# Patient Record
Sex: FEMALE | Race: WHITE | NOT HISPANIC OR LATINO
[De-identification: names, ages, dates, MRNs, and addresses within clinical notes are randomized per-mention and may not be internally consistent; named-entity substitution may affect disease eponyms.]

---

## 2017-01-04 ENCOUNTER — APPOINTMENT (OUTPATIENT)
Dept: OBGYN | Facility: CLINIC | Age: 62
End: 2017-01-04

## 2017-01-04 VITALS
SYSTOLIC BLOOD PRESSURE: 130 MMHG | WEIGHT: 238 LBS | DIASTOLIC BLOOD PRESSURE: 80 MMHG | HEIGHT: 63 IN | BODY MASS INDEX: 42.17 KG/M2

## 2017-01-04 DIAGNOSIS — R93.8 ABNORMAL FINDINGS ON DIAGNOSTIC IMAGING OF OTHER SPECIFIED BODY STRUCTURES: ICD-10-CM

## 2017-01-06 ENCOUNTER — OUTPATIENT (OUTPATIENT)
Dept: OUTPATIENT SERVICES | Facility: HOSPITAL | Age: 62
LOS: 1 days | Discharge: HOME | End: 2017-01-06

## 2017-01-06 DIAGNOSIS — R10.2 PELVIC AND PERINEAL PAIN: ICD-10-CM

## 2017-01-13 ENCOUNTER — OUTPATIENT (OUTPATIENT)
Dept: OUTPATIENT SERVICES | Facility: HOSPITAL | Age: 62
LOS: 1 days | Discharge: HOME | End: 2017-01-13
Payer: MEDICAID

## 2017-01-13 DIAGNOSIS — K57.90 DIVERTICULOSIS OF INTESTINE, PART UNSPECIFIED, WITHOUT PERFORATION OR ABSCESS WITHOUT BLEEDING: ICD-10-CM

## 2017-01-13 DIAGNOSIS — M19.90 UNSPECIFIED OSTEOARTHRITIS, UNSPECIFIED SITE: ICD-10-CM

## 2017-01-13 DIAGNOSIS — Q25.49 OTHER CONGENITAL MALFORMATIONS OF AORTA: ICD-10-CM

## 2017-01-13 DIAGNOSIS — R09.82 POSTNASAL DRIP: ICD-10-CM

## 2017-01-13 DIAGNOSIS — E66.9 OBESITY, UNSPECIFIED: ICD-10-CM

## 2017-01-13 DIAGNOSIS — Z90.49 ACQUIRED ABSENCE OF OTHER SPECIFIED PARTS OF DIGESTIVE TRACT: ICD-10-CM

## 2017-01-13 DIAGNOSIS — G90.2 HORNER'S SYNDROME: ICD-10-CM

## 2017-01-13 DIAGNOSIS — J30.89 OTHER ALLERGIC RHINITIS: ICD-10-CM

## 2017-01-13 DIAGNOSIS — K64.9 UNSPECIFIED HEMORRHOIDS: ICD-10-CM

## 2017-01-13 DIAGNOSIS — E78.5 HYPERLIPIDEMIA, UNSPECIFIED: ICD-10-CM

## 2017-01-13 DIAGNOSIS — M85.80 OTHER SPECIFIED DISORDERS OF BONE DENSITY AND STRUCTURE, UNSPECIFIED SITE: ICD-10-CM

## 2017-01-13 DIAGNOSIS — K21.9 GASTRO-ESOPHAGEAL REFLUX DISEASE WITHOUT ESOPHAGITIS: ICD-10-CM

## 2017-01-13 DIAGNOSIS — J34.2 DEVIATED NASAL SEPTUM: ICD-10-CM

## 2017-01-13 DIAGNOSIS — R10.2 PELVIC AND PERINEAL PAIN: ICD-10-CM

## 2017-01-13 DIAGNOSIS — F32.9 MAJOR DEPRESSIVE DISORDER, SINGLE EPISODE, UNSPECIFIED: ICD-10-CM

## 2017-01-13 DIAGNOSIS — I10 ESSENTIAL (PRIMARY) HYPERTENSION: ICD-10-CM

## 2017-01-13 DIAGNOSIS — F41.9 ANXIETY DISORDER, UNSPECIFIED: ICD-10-CM

## 2017-01-13 DIAGNOSIS — G47.30 SLEEP APNEA, UNSPECIFIED: ICD-10-CM

## 2017-01-13 PROCEDURE — 88305 TISSUE EXAM BY PATHOLOGIST: CPT | Mod: 26

## 2017-01-13 PROCEDURE — 58558 HYSTEROSCOPY BIOPSY: CPT

## 2017-01-20 DIAGNOSIS — Z96.651 PRESENCE OF RIGHT ARTIFICIAL KNEE JOINT: ICD-10-CM

## 2017-01-20 DIAGNOSIS — E78.5 HYPERLIPIDEMIA, UNSPECIFIED: ICD-10-CM

## 2017-01-20 DIAGNOSIS — N84.0 POLYP OF CORPUS UTERI: ICD-10-CM

## 2017-01-20 DIAGNOSIS — Z88.8 ALLERGY STATUS TO OTHER DRUGS, MEDICAMENTS AND BIOLOGICAL SUBSTANCES STATUS: ICD-10-CM

## 2017-01-20 DIAGNOSIS — F41.8 OTHER SPECIFIED ANXIETY DISORDERS: ICD-10-CM

## 2017-01-20 DIAGNOSIS — K21.9 GASTRO-ESOPHAGEAL REFLUX DISEASE WITHOUT ESOPHAGITIS: ICD-10-CM

## 2017-01-20 DIAGNOSIS — Z87.891 PERSONAL HISTORY OF NICOTINE DEPENDENCE: ICD-10-CM

## 2017-01-20 DIAGNOSIS — Z79.82 LONG TERM (CURRENT) USE OF ASPIRIN: ICD-10-CM

## 2017-01-20 DIAGNOSIS — I34.0 NONRHEUMATIC MITRAL (VALVE) INSUFFICIENCY: ICD-10-CM

## 2017-01-20 DIAGNOSIS — Z88.2 ALLERGY STATUS TO SULFONAMIDES: ICD-10-CM

## 2017-01-20 DIAGNOSIS — I10 ESSENTIAL (PRIMARY) HYPERTENSION: ICD-10-CM

## 2017-01-31 ENCOUNTER — APPOINTMENT (OUTPATIENT)
Dept: UROLOGY | Facility: CLINIC | Age: 62
End: 2017-01-31

## 2017-01-31 VITALS
HEIGHT: 63 IN | HEART RATE: 52 BPM | WEIGHT: 238 LBS | BODY MASS INDEX: 42.17 KG/M2 | DIASTOLIC BLOOD PRESSURE: 81 MMHG | SYSTOLIC BLOOD PRESSURE: 119 MMHG | TEMPERATURE: 97.5 F

## 2017-02-01 ENCOUNTER — APPOINTMENT (OUTPATIENT)
Dept: OBGYN | Facility: CLINIC | Age: 62
End: 2017-02-01

## 2017-02-01 VITALS
BODY MASS INDEX: 41.64 KG/M2 | WEIGHT: 235 LBS | TEMPERATURE: 97.6 F | HEIGHT: 63 IN | SYSTOLIC BLOOD PRESSURE: 124 MMHG | DIASTOLIC BLOOD PRESSURE: 74 MMHG

## 2017-03-13 ENCOUNTER — RX RENEWAL (OUTPATIENT)
Age: 62
End: 2017-03-13

## 2017-03-15 ENCOUNTER — MESSAGE (OUTPATIENT)
Age: 62
End: 2017-03-15

## 2017-04-12 ENCOUNTER — APPOINTMENT (OUTPATIENT)
Dept: OBGYN | Facility: CLINIC | Age: 62
End: 2017-04-12

## 2017-04-12 ENCOUNTER — LABORATORY RESULT (OUTPATIENT)
Age: 62
End: 2017-04-12

## 2017-04-12 VITALS
SYSTOLIC BLOOD PRESSURE: 124 MMHG | BODY MASS INDEX: 42.52 KG/M2 | HEIGHT: 63 IN | DIASTOLIC BLOOD PRESSURE: 70 MMHG | WEIGHT: 240 LBS

## 2017-04-12 DIAGNOSIS — R53.83 OTHER FATIGUE: ICD-10-CM

## 2017-04-12 DIAGNOSIS — N95.2 POSTMENOPAUSAL ATROPHIC VAGINITIS: ICD-10-CM

## 2017-04-15 LAB
BASOPHILS # BLD AUTO: 0.02 K/UL
BASOPHILS NFR BLD AUTO: 0.4 %
C TRACH RRNA SPEC QL NAA+PROBE: NORMAL
CYTOLOGY CVX/VAG DOC THIN PREP: NORMAL
EOSINOPHIL # BLD AUTO: 0.23 K/UL
EOSINOPHIL NFR BLD AUTO: 4.8 %
HBV SURFACE AG SER QL: NONREACTIVE
HCT VFR BLD CALC: 43.4 %
HCV AB SER QL: NONREACTIVE
HCV S/CO RATIO: 0.19 S/CO
HGB BLD-MCNC: 14.8 G/DL
HIV1+2 AB SPEC QL IA.RAPID: NONREACTIVE
HPV HIGH+LOW RISK DNA PNL CVX: NEGATIVE
IMM GRANULOCYTES NFR BLD AUTO: 0.2 %
LYMPHOCYTES # BLD AUTO: 1.11 K/UL
LYMPHOCYTES NFR BLD AUTO: 23.2 %
MAN DIFF?: NORMAL
MCHC RBC-ENTMCNC: 31.9 PG
MCHC RBC-ENTMCNC: 34.1 GM/DL
MCV RBC AUTO: 93.5 FL
MONOCYTES # BLD AUTO: 0.47 K/UL
MONOCYTES NFR BLD AUTO: 9.8 %
N GONORRHOEA RRNA SPEC QL NAA+PROBE: NORMAL
NEUTROPHILS # BLD AUTO: 2.95 K/UL
NEUTROPHILS NFR BLD AUTO: 61.6 %
PLATELET # BLD AUTO: 330 K/UL
RBC # BLD: 4.64 M/UL
RBC # FLD: 12.7 %
RPR SER-TITR: NORMAL
SOURCE TP AMPLIFICATION: NORMAL
TSH SERPL-ACNC: 1.18 UIU/ML
WBC # FLD AUTO: 4.79 K/UL

## 2017-04-17 ENCOUNTER — RESULT REVIEW (OUTPATIENT)
Age: 62
End: 2017-04-17

## 2017-05-09 ENCOUNTER — TRANSCRIPTION ENCOUNTER (OUTPATIENT)
Age: 62
End: 2017-05-09

## 2017-11-01 ENCOUNTER — OUTPATIENT (OUTPATIENT)
Dept: OUTPATIENT SERVICES | Facility: HOSPITAL | Age: 62
LOS: 1 days | Discharge: HOME | End: 2017-11-01

## 2017-11-01 DIAGNOSIS — R13.19 OTHER DYSPHAGIA: ICD-10-CM

## 2017-11-01 DIAGNOSIS — F45.8 OTHER SOMATOFORM DISORDERS: ICD-10-CM

## 2017-11-01 DIAGNOSIS — K21.9 GASTRO-ESOPHAGEAL REFLUX DISEASE WITHOUT ESOPHAGITIS: ICD-10-CM

## 2017-11-03 ENCOUNTER — FORM ENCOUNTER (OUTPATIENT)
Age: 62
End: 2017-11-03

## 2017-11-04 ENCOUNTER — APPOINTMENT (OUTPATIENT)
Dept: MAMMOGRAPHY | Facility: CLINIC | Age: 62
End: 2017-11-04
Payer: MEDICAID

## 2017-11-04 ENCOUNTER — OUTPATIENT (OUTPATIENT)
Dept: OUTPATIENT SERVICES | Facility: HOSPITAL | Age: 62
LOS: 1 days | End: 2017-11-04
Payer: MEDICAID

## 2017-11-04 DIAGNOSIS — Z00.8 ENCOUNTER FOR OTHER GENERAL EXAMINATION: ICD-10-CM

## 2017-11-04 PROCEDURE — 77067 SCR MAMMO BI INCL CAD: CPT

## 2017-11-04 PROCEDURE — G0202: CPT | Mod: 26

## 2017-11-04 PROCEDURE — 77063 BREAST TOMOSYNTHESIS BI: CPT

## 2017-11-04 PROCEDURE — 77063 BREAST TOMOSYNTHESIS BI: CPT | Mod: 26

## 2018-01-18 ENCOUNTER — RX RENEWAL (OUTPATIENT)
Age: 63
End: 2018-01-18

## 2018-01-23 ENCOUNTER — MEDICATION RENEWAL (OUTPATIENT)
Age: 63
End: 2018-01-23

## 2018-02-02 ENCOUNTER — APPOINTMENT (OUTPATIENT)
Dept: UROLOGY | Facility: CLINIC | Age: 63
End: 2018-02-02
Payer: MEDICAID

## 2018-02-02 VITALS
HEIGHT: 63 IN | BODY MASS INDEX: 41.64 KG/M2 | DIASTOLIC BLOOD PRESSURE: 90 MMHG | HEART RATE: 68 BPM | OXYGEN SATURATION: 98 % | SYSTOLIC BLOOD PRESSURE: 135 MMHG | WEIGHT: 235 LBS | TEMPERATURE: 97.6 F | RESPIRATION RATE: 16 BRPM

## 2018-02-02 PROCEDURE — 99213 OFFICE O/P EST LOW 20 MIN: CPT

## 2018-02-03 ENCOUNTER — OUTPATIENT (OUTPATIENT)
Dept: OUTPATIENT SERVICES | Facility: HOSPITAL | Age: 63
LOS: 1 days | Discharge: HOME | End: 2018-02-03

## 2018-02-03 DIAGNOSIS — E78.00 PURE HYPERCHOLESTEROLEMIA, UNSPECIFIED: ICD-10-CM

## 2018-02-03 DIAGNOSIS — R82.71 BACTERIURIA: ICD-10-CM

## 2018-02-03 DIAGNOSIS — I10 ESSENTIAL (PRIMARY) HYPERTENSION: ICD-10-CM

## 2018-02-03 LAB
ALBUMIN SERPL ELPH-MCNC: 3.7 G/DL — SIGNIFICANT CHANGE UP (ref 3.3–5)
ALP SERPL-CCNC: 82 U/L — SIGNIFICANT CHANGE UP (ref 40–120)
ALT FLD-CCNC: 23 U/L — SIGNIFICANT CHANGE UP (ref 12–78)
ANION GAP SERPL CALC-SCNC: 6 MMOL/L — SIGNIFICANT CHANGE UP (ref 5–17)
AST SERPL-CCNC: 14 U/L — LOW (ref 15–37)
BILIRUB SERPL-MCNC: 0.5 MG/DL — SIGNIFICANT CHANGE UP (ref 0.2–1.2)
BUN SERPL-MCNC: 19 MG/DL — SIGNIFICANT CHANGE UP (ref 7–23)
CALCIUM SERPL-MCNC: 8.8 MG/DL — SIGNIFICANT CHANGE UP (ref 8.5–10.1)
CHLORIDE SERPL-SCNC: 104 MMOL/L — SIGNIFICANT CHANGE UP (ref 96–108)
CHOLEST SERPL-MCNC: 204 MG/DL — HIGH (ref 10–199)
CO2 SERPL-SCNC: 30 MMOL/L — SIGNIFICANT CHANGE UP (ref 22–31)
CREAT SERPL-MCNC: 0.99 MG/DL — SIGNIFICANT CHANGE UP (ref 0.5–1.3)
ESTIMATED AVERAGE GLUCOSE: 111 MG/DL — SIGNIFICANT CHANGE UP (ref 68–114)
GLUCOSE SERPL-MCNC: 109 MG/DL — HIGH (ref 70–99)
HBA1C BLD-MCNC: 5.5 % — SIGNIFICANT CHANGE UP (ref 4–5.6)
HCT VFR BLD CALC: 42.2 % — SIGNIFICANT CHANGE UP (ref 34.5–45)
HCV AB S/CO SERPL IA: 0.12 S/CO — SIGNIFICANT CHANGE UP
HCV AB SERPL-IMP: SIGNIFICANT CHANGE UP
HDLC SERPL-MCNC: 67 MG/DL — SIGNIFICANT CHANGE UP (ref 40–125)
HGB BLD-MCNC: 14.6 G/DL — SIGNIFICANT CHANGE UP (ref 11.5–15.5)
LIPID PNL WITH DIRECT LDL SERPL: 114 MG/DL — SIGNIFICANT CHANGE UP
MCHC RBC-ENTMCNC: 31.8 PG — SIGNIFICANT CHANGE UP (ref 27–34)
MCHC RBC-ENTMCNC: 34.6 GM/DL — SIGNIFICANT CHANGE UP (ref 32–36)
MCV RBC AUTO: 91.9 FL — SIGNIFICANT CHANGE UP (ref 80–100)
PLATELET # BLD AUTO: 374 K/UL — SIGNIFICANT CHANGE UP (ref 150–400)
POTASSIUM SERPL-MCNC: 3.8 MMOL/L — SIGNIFICANT CHANGE UP (ref 3.5–5.3)
POTASSIUM SERPL-SCNC: 3.8 MMOL/L — SIGNIFICANT CHANGE UP (ref 3.5–5.3)
PROT SERPL-MCNC: 7.6 GM/DL — SIGNIFICANT CHANGE UP (ref 6–8.3)
RBC # BLD: 4.59 M/UL — SIGNIFICANT CHANGE UP (ref 3.8–5.2)
RBC # FLD: 11.3 % — SIGNIFICANT CHANGE UP (ref 10.3–14.5)
SODIUM SERPL-SCNC: 140 MMOL/L — SIGNIFICANT CHANGE UP (ref 135–145)
T3 SERPL-MCNC: 124 NG/DL — SIGNIFICANT CHANGE UP (ref 80–200)
T4 FREE SERPL-MCNC: 1.08 NG/DL — SIGNIFICANT CHANGE UP (ref 0.76–1.46)
TOTAL CHOLESTEROL/HDL RATIO MEASUREMENT: 3 RATIO — LOW (ref 3.3–7.1)
TRIGL SERPL-MCNC: 116 MG/DL — SIGNIFICANT CHANGE UP (ref 10–149)
TSH SERPL-MCNC: 1.42 UU/ML — SIGNIFICANT CHANGE UP (ref 0.36–3.74)
WBC # BLD: 4.8 K/UL — SIGNIFICANT CHANGE UP (ref 3.8–10.5)
WBC # FLD AUTO: 4.8 K/UL — SIGNIFICANT CHANGE UP (ref 3.8–10.5)

## 2018-02-04 LAB
CULTURE RESULTS: SIGNIFICANT CHANGE UP
SPECIMEN SOURCE: SIGNIFICANT CHANGE UP

## 2018-02-05 LAB — HIV 1+2 AB+HIV1 P24 AG SERPL QL IA: SIGNIFICANT CHANGE UP

## 2018-03-19 ENCOUNTER — RX RENEWAL (OUTPATIENT)
Age: 63
End: 2018-03-19

## 2018-03-28 ENCOUNTER — APPOINTMENT (OUTPATIENT)
Dept: INTERNAL MEDICINE | Facility: CLINIC | Age: 63
End: 2018-03-28
Payer: MEDICAID

## 2018-03-28 VITALS
HEART RATE: 93 BPM | TEMPERATURE: 98.2 F | SYSTOLIC BLOOD PRESSURE: 126 MMHG | DIASTOLIC BLOOD PRESSURE: 70 MMHG | RESPIRATION RATE: 20 BRPM | BODY MASS INDEX: 43.41 KG/M2 | WEIGHT: 245 LBS | OXYGEN SATURATION: 95 % | HEIGHT: 63 IN

## 2018-03-28 DIAGNOSIS — F41.9 ANXIETY DISORDER, UNSPECIFIED: ICD-10-CM

## 2018-03-28 DIAGNOSIS — F32.9 MAJOR DEPRESSIVE DISORDER, SINGLE EPISODE, UNSPECIFIED: ICD-10-CM

## 2018-03-28 DIAGNOSIS — Z71.9 COUNSELING, UNSPECIFIED: ICD-10-CM

## 2018-03-28 DIAGNOSIS — I10 ESSENTIAL (PRIMARY) HYPERTENSION: ICD-10-CM

## 2018-03-28 PROCEDURE — 94060 EVALUATION OF WHEEZING: CPT

## 2018-03-28 PROCEDURE — 94727 GAS DIL/WSHOT DETER LNG VOL: CPT

## 2018-03-28 PROCEDURE — 94729 DIFFUSING CAPACITY: CPT

## 2018-03-28 PROCEDURE — 99244 OFF/OP CNSLTJ NEW/EST MOD 40: CPT | Mod: 25

## 2018-03-28 PROCEDURE — ZZZZZ: CPT

## 2018-03-28 RX ORDER — ASPIRIN 81 MG
81 TABLET, DELAYED RELEASE (ENTERIC COATED) ORAL
Refills: 0 | Status: ACTIVE | COMMUNITY

## 2018-03-28 RX ORDER — IRON,CARBONYL/ASCORBIC ACID 65MG-125MG
TABLET, DELAYED RELEASE (ENTERIC COATED) ORAL
Refills: 0 | Status: ACTIVE | COMMUNITY

## 2018-05-09 ENCOUNTER — OTHER (OUTPATIENT)
Age: 63
End: 2018-05-09

## 2018-05-16 ENCOUNTER — APPOINTMENT (OUTPATIENT)
Dept: OBGYN | Facility: CLINIC | Age: 63
End: 2018-05-16
Payer: MEDICAID

## 2018-05-16 VITALS
HEIGHT: 63 IN | RESPIRATION RATE: 18 BRPM | WEIGHT: 245 LBS | SYSTOLIC BLOOD PRESSURE: 130 MMHG | HEART RATE: 86 BPM | DIASTOLIC BLOOD PRESSURE: 74 MMHG | BODY MASS INDEX: 43.41 KG/M2

## 2018-05-16 DIAGNOSIS — Z01.419 ENCOUNTER FOR GYNECOLOGICAL EXAMINATION (GENERAL) (ROUTINE) W/OUT ABNORMAL FINDINGS: ICD-10-CM

## 2018-05-16 PROCEDURE — 99396 PREV VISIT EST AGE 40-64: CPT

## 2018-05-16 PROCEDURE — 82270 OCCULT BLOOD FECES: CPT

## 2018-05-19 LAB — HPV HIGH+LOW RISK DNA PNL CVX: NOT DETECTED

## 2018-05-21 ENCOUNTER — RX RENEWAL (OUTPATIENT)
Age: 63
End: 2018-05-21

## 2018-05-29 ENCOUNTER — APPOINTMENT (OUTPATIENT)
Dept: COLORECTAL SURGERY | Facility: CLINIC | Age: 63
End: 2018-05-29
Payer: MEDICAID

## 2018-05-29 VITALS
SYSTOLIC BLOOD PRESSURE: 111 MMHG | HEIGHT: 63 IN | TEMPERATURE: 97.9 F | WEIGHT: 245 LBS | DIASTOLIC BLOOD PRESSURE: 71 MMHG | BODY MASS INDEX: 43.41 KG/M2 | HEART RATE: 84 BPM

## 2018-05-29 DIAGNOSIS — Z78.9 OTHER SPECIFIED HEALTH STATUS: ICD-10-CM

## 2018-05-29 DIAGNOSIS — K43.2 INCISIONAL HERNIA W/OUT OBSTRUCTION OR GANGRENE: ICD-10-CM

## 2018-05-29 DIAGNOSIS — Z80.42 FAMILY HISTORY OF MALIGNANT NEOPLASM OF PROSTATE: ICD-10-CM

## 2018-05-29 DIAGNOSIS — Z80.0 FAMILY HISTORY OF MALIGNANT NEOPLASM OF DIGESTIVE ORGANS: ICD-10-CM

## 2018-05-29 DIAGNOSIS — Z82.5 FAMILY HISTORY OF ASTHMA AND OTHER CHRONIC LOWER RESPIRATORY DISEASES: ICD-10-CM

## 2018-05-29 DIAGNOSIS — Z83.49 FAMILY HISTORY OF OTHER ENDOCRINE, NUTRITIONAL AND METABOLIC DISEASES: ICD-10-CM

## 2018-05-29 DIAGNOSIS — Z83.3 FAMILY HISTORY OF DIABETES MELLITUS: ICD-10-CM

## 2018-05-29 DIAGNOSIS — Z82.49 FAMILY HISTORY OF ISCHEMIC HEART DISEASE AND OTHER DISEASES OF THE CIRCULATORY SYSTEM: ICD-10-CM

## 2018-05-29 DIAGNOSIS — Z82.61 FAMILY HISTORY OF ARTHRITIS: ICD-10-CM

## 2018-05-29 DIAGNOSIS — Z82.69 FAMILY HISTORY OF OTHER DISEASES OF THE MUSCULOSKELETAL SYSTEM AND CONNECTIVE TISSUE: ICD-10-CM

## 2018-05-29 LAB — CYTOLOGY CVX/VAG DOC THIN PREP: NORMAL

## 2018-05-29 PROCEDURE — 99245 OFF/OP CONSLTJ NEW/EST HI 55: CPT

## 2018-05-30 PROBLEM — Z82.61 FAMILY HISTORY OF RHEUMATOID ARTHRITIS: Status: ACTIVE | Noted: 2018-03-28

## 2018-05-30 PROBLEM — Z82.5 FAMILY HISTORY OF ASTHMA: Status: ACTIVE | Noted: 2018-03-28

## 2018-05-30 PROBLEM — Z82.5 FAMILY HISTORY OF EMPHYSEMA: Status: ACTIVE | Noted: 2018-03-28

## 2018-05-30 PROBLEM — Z80.0 FAMILY HISTORY OF MALIGNANT NEOPLASM OF COLON: Status: ACTIVE | Noted: 2018-03-28

## 2018-05-30 PROBLEM — Z83.3 FAMILY HISTORY OF TYPE 2 DIABETES MELLITUS: Status: ACTIVE | Noted: 2018-03-28

## 2018-05-30 PROBLEM — Z82.49 FAMILY HISTORY OF HYPERTENSION: Status: ACTIVE | Noted: 2018-03-28

## 2018-05-30 PROBLEM — Z82.69 FAMILY HISTORY OF FIBROMYALGIA: Status: ACTIVE | Noted: 2018-03-28

## 2018-05-30 PROBLEM — K43.2 INCISIONAL HERNIA: Status: ACTIVE | Noted: 2018-05-30

## 2018-05-30 PROBLEM — Z83.49 FAMILY HISTORY OF THYROID DISEASE: Status: ACTIVE | Noted: 2018-03-28

## 2018-05-30 PROBLEM — Z80.42 FAMILY HISTORY OF MALIGNANT NEOPLASM OF PROSTATE: Status: ACTIVE | Noted: 2018-03-28

## 2018-05-30 PROBLEM — Z78.9 RARELY CONSUMES ALCOHOL: Status: ACTIVE | Noted: 2018-03-28

## 2018-06-04 ENCOUNTER — APPOINTMENT (OUTPATIENT)
Dept: INTERNAL MEDICINE | Facility: CLINIC | Age: 63
End: 2018-06-04

## 2018-06-13 ENCOUNTER — RESULT REVIEW (OUTPATIENT)
Age: 63
End: 2018-06-13

## 2018-07-17 ENCOUNTER — RX RENEWAL (OUTPATIENT)
Age: 63
End: 2018-07-17

## 2018-10-03 ENCOUNTER — RX RENEWAL (OUTPATIENT)
Age: 63
End: 2018-10-03

## 2018-11-05 ENCOUNTER — FORM ENCOUNTER (OUTPATIENT)
Age: 63
End: 2018-11-05

## 2018-11-06 ENCOUNTER — OUTPATIENT (OUTPATIENT)
Dept: OUTPATIENT SERVICES | Facility: HOSPITAL | Age: 63
LOS: 1 days | End: 2018-11-06
Payer: MEDICAID

## 2018-11-06 ENCOUNTER — APPOINTMENT (OUTPATIENT)
Dept: MAMMOGRAPHY | Facility: CLINIC | Age: 63
End: 2018-11-06
Payer: MEDICAID

## 2018-11-06 DIAGNOSIS — Z00.8 ENCOUNTER FOR OTHER GENERAL EXAMINATION: ICD-10-CM

## 2018-11-06 PROCEDURE — 77067 SCR MAMMO BI INCL CAD: CPT | Mod: 26

## 2018-11-06 PROCEDURE — 77067 SCR MAMMO BI INCL CAD: CPT

## 2018-11-06 PROCEDURE — 77063 BREAST TOMOSYNTHESIS BI: CPT | Mod: 26

## 2018-11-06 PROCEDURE — 77063 BREAST TOMOSYNTHESIS BI: CPT

## 2018-11-28 ENCOUNTER — OUTPATIENT (OUTPATIENT)
Dept: OUTPATIENT SERVICES | Facility: HOSPITAL | Age: 63
LOS: 1 days | Discharge: HOME | End: 2018-11-28

## 2018-11-28 DIAGNOSIS — I71.2 THORACIC AORTIC ANEURYSM, WITHOUT RUPTURE: ICD-10-CM

## 2019-01-28 ENCOUNTER — APPOINTMENT (OUTPATIENT)
Dept: RADIOLOGY | Facility: CLINIC | Age: 64
End: 2019-01-28

## 2019-05-10 ENCOUNTER — LABORATORY RESULT (OUTPATIENT)
Age: 64
End: 2019-05-10

## 2019-05-16 ENCOUNTER — RESULT REVIEW (OUTPATIENT)
Age: 64
End: 2019-05-16

## 2019-05-16 ENCOUNTER — APPOINTMENT (OUTPATIENT)
Dept: GASTROENTEROLOGY | Facility: HOSPITAL | Age: 64
End: 2019-05-16
Payer: MEDICAID

## 2019-05-16 ENCOUNTER — OUTPATIENT (OUTPATIENT)
Dept: OUTPATIENT SERVICES | Facility: HOSPITAL | Age: 64
LOS: 1 days | Discharge: HOME | End: 2019-05-16
Payer: MEDICAID

## 2019-05-16 VITALS
HEIGHT: 63 IN | RESPIRATION RATE: 14 BRPM | DIASTOLIC BLOOD PRESSURE: 78 MMHG | OXYGEN SATURATION: 97 % | HEART RATE: 61 BPM | SYSTOLIC BLOOD PRESSURE: 131 MMHG | WEIGHT: 242.95 LBS | TEMPERATURE: 97 F

## 2019-05-16 PROCEDURE — 88305 TISSUE EXAM BY PATHOLOGIST: CPT | Mod: 26

## 2019-05-16 PROCEDURE — 43239 EGD BIOPSY SINGLE/MULTIPLE: CPT

## 2019-05-16 PROCEDURE — 88312 SPECIAL STAINS GROUP 1: CPT | Mod: 26

## 2019-05-16 PROCEDURE — G0121 COLON CA SCRN NOT HI RSK IND: CPT

## 2019-05-16 RX ORDER — SODIUM CHLORIDE 9 MG/ML
1000 INJECTION INTRAMUSCULAR; INTRAVENOUS; SUBCUTANEOUS
Refills: 0 | Status: DISCONTINUED | OUTPATIENT
Start: 2019-05-16 | End: 2019-05-31

## 2019-05-16 NOTE — ASU PREOP CHECKLIST - PATIENT'S PERSONAL PROPERTY REMOVED
glasses/jewelry/clothes / cell jewlery / watch jewelry/clothes / cell jewlery / watch / wallet/glasses

## 2019-05-16 NOTE — ASU PATIENT PROFILE, ADULT - PMH
Anxiety    Depression    Gallstones    Gastric bypass status for obesity  sept 2003  GERD (gastroesophageal reflux disease)    HTN (hypertension)    Osteoarthritis    Sleep apnea

## 2019-05-16 NOTE — ASU PATIENT PROFILE, ADULT - PSH
Acquired female bladder prolapse    Benign cyst of skin    Failed total right knee replacement    H/O abdominoplasty    H/O arthroscopy of left knee    H/O arthroscopy of right knee    H/O bilateral breast reduction surgery    H/O dilation and curettage    Hx of gastric bypass

## 2019-05-17 LAB — SURGICAL PATHOLOGY STUDY: SIGNIFICANT CHANGE UP

## 2019-05-21 ENCOUNTER — APPOINTMENT (OUTPATIENT)
Dept: COLORECTAL SURGERY | Facility: CLINIC | Age: 64
End: 2019-05-21
Payer: MEDICAID

## 2019-05-21 VITALS
BODY MASS INDEX: 43.41 KG/M2 | RESPIRATION RATE: 16 BRPM | DIASTOLIC BLOOD PRESSURE: 77 MMHG | SYSTOLIC BLOOD PRESSURE: 117 MMHG | WEIGHT: 245 LBS | HEIGHT: 63 IN | TEMPERATURE: 98.2 F | HEART RATE: 62 BPM

## 2019-05-21 DIAGNOSIS — K62.89 OTHER SPECIFIED DISEASES OF ANUS AND RECTUM: ICD-10-CM

## 2019-05-21 PROCEDURE — 46220 EXCISE ANAL EXT TAG/PAPILLA: CPT

## 2019-05-21 PROCEDURE — 99214 OFFICE O/P EST MOD 30 MIN: CPT | Mod: 25

## 2019-05-21 NOTE — HISTORY OF PRESENT ILLNESS
[FreeTextEntry1] : Ms. Salazar presents to the office with reports of prolapsing hemorrhoid. Has been present for several years. No significant discomfort associated with it but would like to have removed.\par Elective hemorrhoidectomy followed by fissurectomy ~ 10 years earlier.\par Recent EGD/Colonoscopy with Dr. Ansari. \par Has issues with IBS but tries to avoid straining with BMs.

## 2019-05-21 NOTE — PHYSICAL EXAM
[Normal rectal exam] : exam was normal [None] : no anal fissures seen [Skin Tags] : residual hemorrhoidal skin tags were noted [de-identified] : Prolapsed hypertrophied anal papillae

## 2019-05-21 NOTE — ASSESSMENT
[FreeTextEntry1] : Ms. Salazar presented to the office for excision of a prolapsing hypertrophied anal papillae. This was performed at the office bedside. The operative site was prepped with Betadine, marked and anesthetized with 1% lidocaine to good effect. Sharp scissors was used to perform the excision and Bovie cautery was then utilized for hemostasis.\par Patient was advised to expect post procedure pain for at least one week's time. During this period, she is to avoid significant amount of straining or activity that could result in excess tissue swelling and lead to a recurrence of the external hemorrhoid/skin tag. She was also advised to expect post procedure drainage of the wound as it heals in by secondary intention. Cotton gauze should be placed within the undergarments to prevent soilage, and sitz bath should be performed 2-3 times daily to keep the site wound site clean and facilitate healing.\par

## 2019-05-22 DIAGNOSIS — Z88.8 ALLERGY STATUS TO OTHER DRUGS, MEDICAMENTS AND BIOLOGICAL SUBSTANCES: ICD-10-CM

## 2019-05-22 DIAGNOSIS — E73.9 LACTOSE INTOLERANCE, UNSPECIFIED: ICD-10-CM

## 2019-05-22 DIAGNOSIS — K57.30 DIVERTICULOSIS OF LARGE INTESTINE WITHOUT PERFORATION OR ABSCESS WITHOUT BLEEDING: ICD-10-CM

## 2019-05-22 DIAGNOSIS — Z12.11 ENCOUNTER FOR SCREENING FOR MALIGNANT NEOPLASM OF COLON: ICD-10-CM

## 2019-05-22 DIAGNOSIS — Z98.84 BARIATRIC SURGERY STATUS: ICD-10-CM

## 2019-05-22 DIAGNOSIS — I10 ESSENTIAL (PRIMARY) HYPERTENSION: ICD-10-CM

## 2019-05-22 DIAGNOSIS — Z80.0 FAMILY HISTORY OF MALIGNANT NEOPLASM OF DIGESTIVE ORGANS: ICD-10-CM

## 2019-05-22 DIAGNOSIS — R10.13 EPIGASTRIC PAIN: ICD-10-CM

## 2019-05-22 DIAGNOSIS — G47.33 OBSTRUCTIVE SLEEP APNEA (ADULT) (PEDIATRIC): ICD-10-CM

## 2019-05-22 DIAGNOSIS — K64.4 RESIDUAL HEMORRHOIDAL SKIN TAGS: ICD-10-CM

## 2019-05-22 DIAGNOSIS — M19.90 UNSPECIFIED OSTEOARTHRITIS, UNSPECIFIED SITE: ICD-10-CM

## 2019-05-22 DIAGNOSIS — Z87.891 PERSONAL HISTORY OF NICOTINE DEPENDENCE: ICD-10-CM

## 2019-05-22 DIAGNOSIS — K31.89 OTHER DISEASES OF STOMACH AND DUODENUM: ICD-10-CM

## 2019-05-22 DIAGNOSIS — E78.00 PURE HYPERCHOLESTEROLEMIA, UNSPECIFIED: ICD-10-CM

## 2019-05-22 DIAGNOSIS — F32.9 MAJOR DEPRESSIVE DISORDER, SINGLE EPISODE, UNSPECIFIED: ICD-10-CM

## 2019-05-22 DIAGNOSIS — Z90.49 ACQUIRED ABSENCE OF OTHER SPECIFIED PARTS OF DIGESTIVE TRACT: ICD-10-CM

## 2019-05-22 DIAGNOSIS — K21.9 GASTRO-ESOPHAGEAL REFLUX DISEASE WITHOUT ESOPHAGITIS: ICD-10-CM

## 2019-05-22 DIAGNOSIS — I71.4 ABDOMINAL AORTIC ANEURYSM, WITHOUT RUPTURE: ICD-10-CM

## 2019-05-22 DIAGNOSIS — Z88.2 ALLERGY STATUS TO SULFONAMIDES: ICD-10-CM

## 2019-06-21 ENCOUNTER — APPOINTMENT (OUTPATIENT)
Dept: OBGYN | Facility: CLINIC | Age: 64
End: 2019-06-21
Payer: MEDICAID

## 2019-06-21 VITALS
OXYGEN SATURATION: 98 % | RESPIRATION RATE: 18 BRPM | HEART RATE: 87 BPM | BODY MASS INDEX: 43.41 KG/M2 | HEIGHT: 63 IN | WEIGHT: 245 LBS | DIASTOLIC BLOOD PRESSURE: 86 MMHG | SYSTOLIC BLOOD PRESSURE: 128 MMHG

## 2019-06-21 DIAGNOSIS — Z01.419 ENCOUNTER FOR GYNECOLOGICAL EXAMINATION (GENERAL) (ROUTINE) W/OUT ABNORMAL FINDINGS: ICD-10-CM

## 2019-06-21 DIAGNOSIS — N95.2 POSTMENOPAUSAL ATROPHIC VAGINITIS: ICD-10-CM

## 2019-06-21 PROCEDURE — 82270 OCCULT BLOOD FECES: CPT

## 2019-06-21 PROCEDURE — 99396 PREV VISIT EST AGE 40-64: CPT

## 2019-06-21 NOTE — CHIEF COMPLAINT
[Annual Visit] : annual visit [FreeTextEntry1] : Patient is a 63-year-old female presents for routine annual gynecologic examination. Patient has no complaints. Patient's last mammogram was November of 2018 and last bone density was November of 2016

## 2019-06-24 LAB — HPV HIGH+LOW RISK DNA PNL CVX: NOT DETECTED

## 2019-06-27 ENCOUNTER — RX RENEWAL (OUTPATIENT)
Age: 64
End: 2019-06-27

## 2019-07-15 ENCOUNTER — APPOINTMENT (OUTPATIENT)
Dept: DERMATOLOGY | Facility: CLINIC | Age: 64
End: 2019-07-15
Payer: MEDICARE

## 2019-07-15 DIAGNOSIS — D48.5 NEOPLASM OF UNCERTAIN BEHAVIOR OF SKIN: ICD-10-CM

## 2019-07-15 DIAGNOSIS — L64.9 ANDROGENIC ALOPECIA, UNSPECIFIED: ICD-10-CM

## 2019-07-15 DIAGNOSIS — L28.0 LICHEN SIMPLEX CHRONICUS: ICD-10-CM

## 2019-07-15 PROCEDURE — 99203 OFFICE O/P NEW LOW 30 MIN: CPT | Mod: 25

## 2019-07-15 PROCEDURE — 11102 TANGNTL BX SKIN SINGLE LES: CPT

## 2019-07-15 NOTE — HISTORY OF PRESENT ILLNESS
[FreeTextEntry1] : Patient presents for skin examination. [de-identified] : note notes dark lesion of the forehead, present for months or longer.  no bleeding or tenderness.

## 2019-07-15 NOTE — PHYSICAL EXAM
[Alert] : alert [Oriented x 3] : ~L oriented x 3 [Well Nourished] : well nourished [Full Body Skin Exam Performed] : performed [FreeTextEntry3] : A full skin exam was performed including the scalp, face, neck, chest, abdomen, back, buttocks, upper extremities and lower extremities.  The patient declined examination of the breasts and genitalia.  \par The exam did not reveal any evidence of skin cancer, showing only the following benign growths:\par Seborrheic keratoses.\par Lentigines - forehead.\par \par LSC of the left forearm.\par \par Firm papule, erythema, of the left medial orbit.\par \par Decreased hair density of the superior and anterior scalp.  Vellus hairs present by ELM.\par

## 2019-07-15 NOTE — ASSESSMENT
[FreeTextEntry1] : SK's - benign.\par \par LSC of the left elbow.\par Emollients.\par \par Androgenetic alopecia\par Education.\par API solution for men recommended, bid.\par \par R/O milium vs. BCC of the left medial orbit.\par Plan D&C if positive.

## 2019-07-19 LAB — CORE LAB BIOPSY: NORMAL

## 2019-09-03 ENCOUNTER — RX RENEWAL (OUTPATIENT)
Age: 64
End: 2019-09-03

## 2019-10-09 ENCOUNTER — OUTPATIENT (OUTPATIENT)
Dept: OUTPATIENT SERVICES | Facility: HOSPITAL | Age: 64
LOS: 1 days | End: 2019-10-09
Payer: MEDICARE

## 2019-10-09 DIAGNOSIS — R53.83 OTHER FATIGUE: ICD-10-CM

## 2019-10-09 PROCEDURE — 80053 COMPREHEN METABOLIC PANEL: CPT

## 2019-10-09 PROCEDURE — 84480 ASSAY TRIIODOTHYRONINE (T3): CPT

## 2019-10-09 PROCEDURE — 82607 VITAMIN B-12: CPT

## 2019-10-09 PROCEDURE — 85027 COMPLETE CBC AUTOMATED: CPT

## 2019-10-09 PROCEDURE — 36415 COLL VENOUS BLD VENIPUNCTURE: CPT

## 2019-10-09 PROCEDURE — 84439 ASSAY OF FREE THYROXINE: CPT

## 2019-10-09 PROCEDURE — 84443 ASSAY THYROID STIM HORMONE: CPT

## 2019-10-09 PROCEDURE — 82306 VITAMIN D 25 HYDROXY: CPT

## 2019-10-10 DIAGNOSIS — R53.83 OTHER FATIGUE: ICD-10-CM

## 2019-10-31 ENCOUNTER — RX RENEWAL (OUTPATIENT)
Age: 64
End: 2019-10-31

## 2019-11-07 ENCOUNTER — APPOINTMENT (OUTPATIENT)
Dept: INTERNAL MEDICINE | Facility: CLINIC | Age: 64
End: 2019-11-07
Payer: MEDICARE

## 2019-11-07 VITALS
SYSTOLIC BLOOD PRESSURE: 122 MMHG | OXYGEN SATURATION: 96 % | HEIGHT: 63 IN | BODY MASS INDEX: 41.46 KG/M2 | DIASTOLIC BLOOD PRESSURE: 76 MMHG | TEMPERATURE: 97.5 F | HEART RATE: 65 BPM | WEIGHT: 234 LBS | RESPIRATION RATE: 16 BRPM

## 2019-11-07 DIAGNOSIS — Z23 ENCOUNTER FOR IMMUNIZATION: ICD-10-CM

## 2019-11-07 DIAGNOSIS — I10 ESSENTIAL (PRIMARY) HYPERTENSION: ICD-10-CM

## 2019-11-07 PROCEDURE — 99214 OFFICE O/P EST MOD 30 MIN: CPT | Mod: 25

## 2019-11-07 PROCEDURE — 94729 DIFFUSING CAPACITY: CPT

## 2019-11-07 PROCEDURE — 90471 IMMUNIZATION ADMIN: CPT | Mod: GY

## 2019-11-07 PROCEDURE — ZZZZZ: CPT

## 2019-11-07 PROCEDURE — 90715 TDAP VACCINE 7 YRS/> IM: CPT | Mod: GY

## 2019-11-07 PROCEDURE — 94060 EVALUATION OF WHEEZING: CPT

## 2019-11-07 PROCEDURE — 94727 GAS DIL/WSHOT DETER LNG VOL: CPT

## 2019-11-07 PROCEDURE — G0009: CPT | Mod: 59

## 2019-11-07 PROCEDURE — 90670 PCV13 VACCINE IM: CPT

## 2019-11-07 RX ORDER — LIDOCAINE HYDROCHLORIDE 20 MG/ML
2 JELLY TOPICAL
Qty: 1 | Refills: 2 | Status: DISCONTINUED | COMMUNITY
Start: 2019-05-21 | End: 2019-11-07

## 2019-11-07 RX ORDER — NYSTATIN AND TRIAMCINOLONE ACETONIDE 100000; 1 [USP'U]/G; MG/G
100000-0.1 OINTMENT TOPICAL TWICE DAILY
Qty: 30 | Refills: 0 | Status: DISCONTINUED | COMMUNITY
Start: 2018-05-16 | End: 2019-11-07

## 2019-11-07 RX ORDER — NYSTATIN 100000 [USP'U]/G
100000 CREAM TOPICAL 3 TIMES DAILY
Qty: 2 | Refills: 1 | Status: DISCONTINUED | COMMUNITY
Start: 2018-10-03 | End: 2019-11-07

## 2019-11-07 NOTE — PHYSICAL EXAM
[General Appearance - Well Developed] : well developed [Normal Appearance] : normal appearance [Well Groomed] : well groomed [General Appearance - Well Nourished] : well nourished [No Deformities] : no deformities [General Appearance - In No Acute Distress] : no acute distress [Eyelids - No Xanthelasma] : the eyelids demonstrated no xanthelasmas [Normal Conjunctiva] : the conjunctiva exhibited no abnormalities [Normal Oropharynx] : normal oropharynx [Neck Appearance] : the appearance of the neck was normal [Neck Cervical Mass (___cm)] : no neck mass was observed [Thyroid Diffuse Enlargement] : the thyroid was not enlarged [Jugular Venous Distention Increased] : there was no jugular-venous distention [Thyroid Nodule] : there were no palpable thyroid nodules [Heart Rate And Rhythm] : heart rate and rhythm were normal [Heart Sounds] : normal S1 and S2 [Respiration, Rhythm And Depth] : normal respiratory rhythm and effort [Murmurs] : no murmurs present [Exaggerated Use Of Accessory Muscles For Inspiration] : no accessory muscle use [Abdomen Soft] : soft [Auscultation Breath Sounds / Voice Sounds] : lungs were clear to auscultation bilaterally [Abdomen Tenderness] : non-tender [Abdomen Mass (___ Cm)] : no abdominal mass palpated [Abnormal Walk] : normal gait [Gait - Sufficient For Exercise Testing] : the gait was sufficient for exercise testing [Nail Clubbing] : no clubbing of the fingernails [Cyanosis, Localized] : no localized cyanosis [Skin Color & Pigmentation] : normal skin color and pigmentation [Petechial Hemorrhages (___cm)] : no petechial hemorrhages [No Venous Stasis] : no venous stasis [] : no rash [No Skin Ulcers] : no skin ulcer [Skin Lesions] : no skin lesions [No Xanthoma] : no  xanthoma was observed [Deep Tendon Reflexes (DTR)] : deep tendon reflexes were 2+ and symmetric [Sensation] : the sensory exam was normal to light touch and pinprick [No Focal Deficits] : no focal deficits [Oriented To Time, Place, And Person] : oriented to person, place, and time [Impaired Insight] : insight and judgment were intact [Affect] : the affect was normal

## 2019-11-08 NOTE — PROCEDURE
[FreeTextEntry1] : Complete woman function tests show normal flow rates. Total capacity is 3.81 L which is 82% predicted. Diffusing capacity is normal.

## 2019-11-08 NOTE — HISTORY OF PRESENT ILLNESS
[FreeTextEntry1] : Ms. Salazar presents for a followup evaluation. She has a history of obstructive sleep apnea. The patient had a previous home polysomnography examination which demonstrated an apnea/hypopnea index of 11 events per hour. She chose not to have CPAP but did not go for an oral dental appliance. Mrs. Salazar is complaining of the persistent daytime tiredness. She remains significantly overweight.

## 2019-11-08 NOTE — ASSESSMENT
[FreeTextEntry1] : Ms. Salazar is a 64-year-old obese female who has a previous history of mild obstructive sleep apnea. She T. supply and daytime fatigue with some difficulty in cognition during the day. Ms. Salazar will be set up for home polysomnography examination. I have explained to the patient that if the results are positive she will need to have a trial of CPAP. She will followup in 2 months.

## 2019-11-11 ENCOUNTER — FORM ENCOUNTER (OUTPATIENT)
Age: 64
End: 2019-11-11

## 2019-11-12 ENCOUNTER — APPOINTMENT (OUTPATIENT)
Dept: MAMMOGRAPHY | Facility: CLINIC | Age: 64
End: 2019-11-12
Payer: MEDICARE

## 2019-11-12 ENCOUNTER — OUTPATIENT (OUTPATIENT)
Dept: OUTPATIENT SERVICES | Facility: HOSPITAL | Age: 64
LOS: 1 days | End: 2019-11-12
Payer: MEDICARE

## 2019-11-12 DIAGNOSIS — Z00.8 ENCOUNTER FOR OTHER GENERAL EXAMINATION: ICD-10-CM

## 2019-11-12 PROCEDURE — 77063 BREAST TOMOSYNTHESIS BI: CPT

## 2019-11-12 PROCEDURE — 77067 SCR MAMMO BI INCL CAD: CPT

## 2019-11-12 PROCEDURE — 77063 BREAST TOMOSYNTHESIS BI: CPT | Mod: 26

## 2019-11-12 PROCEDURE — 77067 SCR MAMMO BI INCL CAD: CPT | Mod: 26

## 2019-11-18 DIAGNOSIS — N64.89 OTHER SPECIFIED DISORDERS OF BREAST: ICD-10-CM

## 2019-11-19 ENCOUNTER — FORM ENCOUNTER (OUTPATIENT)
Age: 64
End: 2019-11-19

## 2019-11-20 ENCOUNTER — OUTPATIENT (OUTPATIENT)
Dept: OUTPATIENT SERVICES | Facility: HOSPITAL | Age: 64
LOS: 1 days | End: 2019-11-20
Payer: MEDICARE

## 2019-11-20 ENCOUNTER — APPOINTMENT (OUTPATIENT)
Dept: MAMMOGRAPHY | Facility: CLINIC | Age: 64
End: 2019-11-20
Payer: MEDICARE

## 2019-11-20 ENCOUNTER — RESULT REVIEW (OUTPATIENT)
Age: 64
End: 2019-11-20

## 2019-11-20 ENCOUNTER — APPOINTMENT (OUTPATIENT)
Dept: ULTRASOUND IMAGING | Facility: CLINIC | Age: 64
End: 2019-11-20
Payer: MEDICARE

## 2019-11-20 DIAGNOSIS — R92.8 OTHER ABNORMAL AND INCONCLUSIVE FINDINGS ON DIAGNOSTIC IMAGING OF BREAST: ICD-10-CM

## 2019-11-20 PROCEDURE — G0279: CPT | Mod: 26

## 2019-11-20 PROCEDURE — 77065 DX MAMMO INCL CAD UNI: CPT | Mod: 26,RT

## 2019-11-20 PROCEDURE — 76642 ULTRASOUND BREAST LIMITED: CPT

## 2019-11-20 PROCEDURE — 76642 ULTRASOUND BREAST LIMITED: CPT | Mod: 26,RT

## 2019-11-20 PROCEDURE — G0279: CPT

## 2019-11-20 PROCEDURE — 77065 DX MAMMO INCL CAD UNI: CPT

## 2020-01-08 ENCOUNTER — APPOINTMENT (OUTPATIENT)
Dept: INTERNAL MEDICINE | Facility: CLINIC | Age: 65
End: 2020-01-08

## 2020-01-15 ENCOUNTER — APPOINTMENT (OUTPATIENT)
Dept: ORTHOPEDIC SURGERY | Facility: CLINIC | Age: 65
End: 2020-01-15
Payer: MEDICARE

## 2020-01-15 VITALS
SYSTOLIC BLOOD PRESSURE: 127 MMHG | HEART RATE: 68 BPM | BODY MASS INDEX: 40.57 KG/M2 | HEIGHT: 63 IN | WEIGHT: 229 LBS | DIASTOLIC BLOOD PRESSURE: 84 MMHG

## 2020-01-15 DIAGNOSIS — M25.571 PAIN IN RIGHT ANKLE AND JOINTS OF RIGHT FOOT: ICD-10-CM

## 2020-01-15 DIAGNOSIS — M17.12 UNILATERAL PRIMARY OSTEOARTHRITIS, LEFT KNEE: ICD-10-CM

## 2020-01-15 DIAGNOSIS — M25.371 OTHER INSTABILITY, RIGHT ANKLE: ICD-10-CM

## 2020-01-15 DIAGNOSIS — M76.72 PERONEAL TENDINITIS, LEFT LEG: ICD-10-CM

## 2020-01-15 DIAGNOSIS — G89.29 PAIN IN RIGHT ANKLE AND JOINTS OF RIGHT FOOT: ICD-10-CM

## 2020-01-15 PROCEDURE — 73562 X-RAY EXAM OF KNEE 3: CPT | Mod: LT

## 2020-01-15 PROCEDURE — 20610 DRAIN/INJ JOINT/BURSA W/O US: CPT | Mod: LT

## 2020-01-15 PROCEDURE — 99204 OFFICE O/P NEW MOD 45 MIN: CPT | Mod: 25

## 2020-01-15 PROCEDURE — 73610 X-RAY EXAM OF ANKLE: CPT | Mod: 50

## 2020-01-15 NOTE — PROCEDURE
[de-identified] : Laterality: Left Knee\par \par The risks and benefits of the injection were reviewed with the patient today in office and all their questions were answered.  The injection site was the anterolateral aspect of the knee with the patient sitting up, knees flexed to 90 degrees.  Prior to giving the injection the injection site was prepped with Chloraprep and a sterile field was created.  Sterile technique was carried out throughout the procedure.  After verbal consent from the patient we went ahead and injected the left knee today with 2 ml 40 mg Depo-Medrol, 4 ml 1% lidocaine and 4 ml of .5% Bupivacaine for a total of 10 ml with a 22 alfredo 1.5" needle.  The medication was placed into the knee without complication.  Post injection the patient reported no pain, had a normal gait and good motion of the knee.  The patient denied numbness and tingling going down their leg.  There were no complications during the procedure.

## 2020-01-15 NOTE — HISTORY OF PRESENT ILLNESS
[de-identified] : Viktoriya is a 64-year-old female who presents with bilateral ankle pain, left worse than right. She also has arthritis in her left knee which is causing her pain. The pain in the left ankle is over the lateral side of the ankle as well as the Achilles tendon insertion. Her main complaint right ankle is intermittent instability. Her left knee is arthritic and causing her pain. She has had cortisone injections in the past. Pain scale left ankle 6/10, right ankle 4/10, left knee 7/10. No other complaints.

## 2020-01-15 NOTE — PHYSICAL EXAM
[de-identified] : Laterality: Left knee\par \par General: Alert and oriented x3.  In no acute distress.  Pleasant in nature with a normal affect.  No apparent respiratory distress. \par \par Erythema, Warmth, Rubor: Negative\par Swelling/Edema: Negative \par ROM: 0-120 degrees\par Irina's Test: Negative \par Medial Joint Line TTP: Positive\par Lateral Joint Line TTP: Negative\par Lachman Exam/Anterior Drawer/Pivot Shift Test: Negative \par MCL Pain: Negative\par LCL Pain: Negative\par Iliotibial Band Pain: Negative\par Patellofemoral Joint Pain: Negative\par Pes Anserinus TTP: Negative\par Homans Sign: Negative\par Neurovascularly: Intact with no sensory or motor deficits\par \par Laterality: Left ankle\par \par General: Alert and oriented x3.  In no acute distress.  Pleasant in nature with a normal affect.  No apparent respiratory distress. \par Erythema, Warmth, Rubor: Negative\par Swelling: No swelling present\par \par ROM:\par 1. Dorsiflexion: 10 degrees\par 2. Plantarflexion: 40 degrees\par 3. Inversion: 10 degrees\par 4. Eversion: 10 degrees\par \par Tenderness to Palpation: \par 1. Lateral Malleolus: Negative\par 2. Medial Malleolus: Negative\par 3. Proximal Fibular Pain: Negative\par 4. Heel Pain: Negative\par \par *Positive pain on palpating the insertion of the Achilles tendon. Positive pain when palpating the peroneal tendon left ankle.\par \par Ligament Pain:\par 1. ATFL/CFL/PTFL: Negative\par 2. Deltoid Ligaments: Negative\par \par Stability: \par 1. Anterior Drawer: Negative\par 2. Posterior Drawer: Negative\par \par Strength: 5/5 TA/GS/EHL\par \par Pulses: 2+ DP/PT Pulses\par \par Neuro: Intact motor and sensory\par \par Additional Test:\par 1. Campoverde's Test: Negative\par 2. Syndesmosis Squeeze Test: Negative\par \par \par Laterality: Right ankle\par \par General: Alert and oriented x3.  In no acute distress.  Pleasant in nature with a normal affect.  No apparent respiratory distress. \par Erythema, Warmth, Rubor: Negative\par Swelling: No swelling present\par \par ROM:\par 1. Dorsiflexion: 10 degrees\par 2. Plantarflexion: 40 degrees\par 3. Inversion: 10 degrees\par 4. Eversion: 10 degrees\par \par Tenderness to Palpation: \par 1. Lateral Malleolus: Negative\par 2. Medial Malleolus: Negative\par 3. Proximal Fibular Pain: Negative\par 4. Heel Pain: Negative\par \par Ligament Pain:\par 1. ATFL/CFL/PTFL: Positive\par 2. Deltoid Ligaments: Negative\par \par Stability: \par 1. Anterior Drawer: 1+\par 2. Posterior Drawer: Negative\par \par Strength: 5/5 TA/GS/EHL\par \par Pulses: 2+ DP/PT Pulses\par \par Neuro: Intact motor and sensory\par \par Additional Test:\par 1. Campoverde's Test: Negative\par 2. Syndesmosis Squeeze Test: Negative\par \par \par \par \par  [de-identified] : 3 views x-rays left ankle show soft-tissue swelling Achilles tendon insertion. No other abnormalities.\par \par 3 views x-rays right ankle show some mild arthritis tibiotalar joint.\par \par 3 views left knee x-rays show arthritis.

## 2020-01-15 NOTE — DISCUSSION/SUMMARY
[de-identified] : Assessment: Left Knee Osteoarthritis/Pain\par \par Plan:\par 1. RICE Therapy.\par 2. Antiinflammatories/Tylenol as needed for pain of discomfort. \par 3. The patient was advised to rest the knee for 24-48 hours post injection.  The patient is able to resume normal activities in 24-48 hours post injection if the knee feels ok.\par 4. Continue with a home exercise/stretching program. \par 5. All the patients questions were answered.  If the patient is experiencing any problems or has concerns they were advised to call the office or make an appointment to come in to be evaluated.\par \par \par *As for her ankles she will use over-the-counter braces for support. She will also start a home exercise/stretching program for both ankles.\par \par

## 2020-02-18 ENCOUNTER — APPOINTMENT (OUTPATIENT)
Dept: UROLOGY | Facility: CLINIC | Age: 65
End: 2020-02-18
Payer: MEDICARE

## 2020-02-18 VITALS
WEIGHT: 233 LBS | DIASTOLIC BLOOD PRESSURE: 85 MMHG | SYSTOLIC BLOOD PRESSURE: 126 MMHG | OXYGEN SATURATION: 95 % | BODY MASS INDEX: 41.29 KG/M2 | HEIGHT: 63 IN | HEART RATE: 62 BPM

## 2020-02-18 PROCEDURE — 99213 OFFICE O/P EST LOW 20 MIN: CPT

## 2020-02-19 LAB
APPEARANCE: CLEAR
BACTERIA: NEGATIVE
BILIRUBIN URINE: NEGATIVE
BLOOD URINE: NEGATIVE
COLOR: YELLOW
GLUCOSE QUALITATIVE U: NEGATIVE
HYALINE CASTS: 0 /LPF
KETONES URINE: NEGATIVE
LEUKOCYTE ESTERASE URINE: NEGATIVE
MICROSCOPIC-UA: NORMAL
NITRITE URINE: NEGATIVE
PH URINE: 6.5
PROTEIN URINE: NEGATIVE
RED BLOOD CELLS URINE: 5 /HPF
SPECIFIC GRAVITY URINE: 1.02
SQUAMOUS EPITHELIAL CELLS: 2 /HPF
UROBILINOGEN URINE: NORMAL
WHITE BLOOD CELLS URINE: 1 /HPF

## 2020-02-21 LAB — BACTERIA UR CULT: NORMAL

## 2020-02-22 NOTE — HISTORY OF PRESENT ILLNESS
[FreeTextEntry1] : This patient is here for a checkup and states that other than occasional frequency he feels that she is doing quite well

## 2020-02-22 NOTE — PHYSICAL EXAM
[Normal Appearance] : normal appearance [General Appearance - Well Developed] : well developed [General Appearance - Well Nourished] : well nourished [Abdomen Soft] : soft [General Appearance - In No Acute Distress] : no acute distress [Well Groomed] : well groomed [Abdomen Tenderness] : non-tender [Urinary Bladder Findings] : the bladder was normal on palpation [Costovertebral Angle Tenderness] : no ~M costovertebral angle tenderness [Edema] : no peripheral edema [Respiration, Rhythm And Depth] : normal respiratory rhythm and effort [] : no respiratory distress [Mood] : the mood was normal [Oriented To Time, Place, And Person] : oriented to person, place, and time [Affect] : the affect was normal [Exaggerated Use Of Accessory Muscles For Inspiration] : no accessory muscle use [Not Anxious] : not anxious [Normal Station and Gait] : the gait and station were normal for the patient's age [No Focal Deficits] : no focal deficits [No Palpable Adenopathy] : no palpable adenopathy

## 2020-03-12 ENCOUNTER — APPOINTMENT (OUTPATIENT)
Dept: UROLOGY | Facility: CLINIC | Age: 65
End: 2020-03-12
Payer: MEDICARE

## 2020-03-12 PROCEDURE — 99213 OFFICE O/P EST LOW 20 MIN: CPT

## 2020-03-12 NOTE — HISTORY OF PRESENT ILLNESS
[FreeTextEntry1] : This patient returns and does not feel that the medication she had tried are helping although there is some relief.

## 2020-04-16 ENCOUNTER — APPOINTMENT (OUTPATIENT)
Dept: BARIATRICS/WEIGHT MGMT | Facility: CLINIC | Age: 65
End: 2020-04-16

## 2020-04-16 ENCOUNTER — APPOINTMENT (OUTPATIENT)
Dept: BARIATRICS | Facility: CLINIC | Age: 65
End: 2020-04-16

## 2020-06-04 ENCOUNTER — RX CHANGE (OUTPATIENT)
Age: 65
End: 2020-06-04

## 2020-06-04 DIAGNOSIS — R35.0 FREQUENCY OF MICTURITION: ICD-10-CM

## 2020-06-10 LAB
APPEARANCE: CLEAR
BACTERIA: NEGATIVE
BILIRUBIN URINE: NEGATIVE
BLOOD URINE: NEGATIVE
COLOR: YELLOW
GLUCOSE QUALITATIVE U: NEGATIVE
HYALINE CASTS: 3 /LPF
KETONES URINE: NEGATIVE
LEUKOCYTE ESTERASE URINE: NEGATIVE
MICROSCOPIC-UA: NORMAL
NITRITE URINE: NEGATIVE
PH URINE: 6.5
PROTEIN URINE: NORMAL
RED BLOOD CELLS URINE: 5 /HPF
SPECIFIC GRAVITY URINE: 1.02
SQUAMOUS EPITHELIAL CELLS: 4 /HPF
UROBILINOGEN URINE: NORMAL
WHITE BLOOD CELLS URINE: 3 /HPF

## 2020-06-11 LAB
BACTERIA UR CULT: NORMAL
URINE CYTOLOGY: NORMAL

## 2020-06-12 ENCOUNTER — RESULT REVIEW (OUTPATIENT)
Age: 65
End: 2020-06-12

## 2020-06-12 ENCOUNTER — RX RENEWAL (OUTPATIENT)
Age: 65
End: 2020-06-12

## 2020-06-12 ENCOUNTER — OUTPATIENT (OUTPATIENT)
Dept: OUTPATIENT SERVICES | Facility: HOSPITAL | Age: 65
LOS: 1 days | End: 2020-06-12
Payer: MEDICARE

## 2020-06-12 ENCOUNTER — APPOINTMENT (OUTPATIENT)
Dept: CT IMAGING | Facility: CLINIC | Age: 65
End: 2020-06-12
Payer: MEDICARE

## 2020-06-12 DIAGNOSIS — Z00.8 ENCOUNTER FOR OTHER GENERAL EXAMINATION: ICD-10-CM

## 2020-06-12 DIAGNOSIS — R31.29 OTHER MICROSCOPIC HEMATURIA: ICD-10-CM

## 2020-06-12 PROCEDURE — 74178 CT ABD&PLV WO CNTR FLWD CNTR: CPT | Mod: 26

## 2020-06-12 PROCEDURE — 82565 ASSAY OF CREATININE: CPT

## 2020-06-12 PROCEDURE — 74178 CT ABD&PLV WO CNTR FLWD CNTR: CPT

## 2020-06-29 ENCOUNTER — APPOINTMENT (OUTPATIENT)
Dept: UROLOGY | Facility: CLINIC | Age: 65
End: 2020-06-29
Payer: MEDICARE

## 2020-06-29 ENCOUNTER — TRANSCRIPTION ENCOUNTER (OUTPATIENT)
Age: 65
End: 2020-06-29

## 2020-06-29 VITALS
OXYGEN SATURATION: 95 % | DIASTOLIC BLOOD PRESSURE: 75 MMHG | SYSTOLIC BLOOD PRESSURE: 128 MMHG | HEART RATE: 57 BPM | HEIGHT: 63 IN | BODY MASS INDEX: 41.29 KG/M2 | TEMPERATURE: 98.2 F | WEIGHT: 233 LBS

## 2020-06-29 PROCEDURE — 52285 CYSTOSCOPY AND TREATMENT: CPT

## 2020-07-21 ENCOUNTER — APPOINTMENT (OUTPATIENT)
Dept: UROLOGY | Facility: CLINIC | Age: 65
End: 2020-07-21
Payer: MEDICARE

## 2020-07-21 VITALS
DIASTOLIC BLOOD PRESSURE: 70 MMHG | HEART RATE: 64 BPM | OXYGEN SATURATION: 95 % | TEMPERATURE: 97.6 F | SYSTOLIC BLOOD PRESSURE: 106 MMHG

## 2020-07-21 PROCEDURE — 51741 ELECTRO-UROFLOWMETRY FIRST: CPT

## 2020-07-21 PROCEDURE — 51798 US URINE CAPACITY MEASURE: CPT | Mod: 59

## 2020-07-21 PROCEDURE — 51797 INTRAABDOMINAL PRESSURE TEST: CPT

## 2020-07-21 PROCEDURE — 51728 CYSTOMETROGRAM W/VP: CPT

## 2020-07-21 PROCEDURE — 51784 ANAL/URINARY MUSCLE STUDY: CPT

## 2020-07-22 ENCOUNTER — APPOINTMENT (OUTPATIENT)
Dept: GASTROENTEROLOGY | Facility: CLINIC | Age: 65
End: 2020-07-22
Payer: MEDICARE

## 2020-07-22 PROCEDURE — 99441: CPT | Mod: 95

## 2020-07-22 RX ORDER — SERTRALINE HYDROCHLORIDE 100 MG/1
100 TABLET, FILM COATED ORAL
Qty: 30 | Refills: 0 | Status: ACTIVE | COMMUNITY
Start: 2020-03-23

## 2020-07-22 RX ORDER — ATORVASTATIN CALCIUM 20 MG/1
20 TABLET, FILM COATED ORAL
Qty: 90 | Refills: 0 | Status: ACTIVE | COMMUNITY
Start: 2020-03-16

## 2020-07-22 NOTE — ASSESSMENT
[FreeTextEntry1] : 66 yo female with hx of gerd on PPI bid.  Reports being on PPI bid and now having increased throughout breakthrough symptoms ass. with some epigastric discomfort.  Worse after large meals. States symptoms are worse in early morning. Last egd was in 2014.  Pt also notes some intermittent episodes of forgetfulness that she thinks is r/t aging?  Pt to f/u with PCP about this.  No abdominal pain, emesis, rectal bleeding or melena. \par \par Impression:\par GERD/gastritis. \par \par Plan:\par Add pepcid at bedtime. \par If no improvement, consider EGD. \par F/u 2 weeks. \par Pt to see PCP about forgetfulness. \par \par Discussed with Dr. Ansari.

## 2020-07-22 NOTE — HISTORY OF PRESENT ILLNESS
[de-identified] : 64 yo female with hx of gerd on PPI bid.  Reports being on PPI bid and now having increased throughout breakthrough symptoms ass. with some epigastric discomfort.  Worse after large meals. States symptoms are worse in early morning. Last egd was in 2014.  Pt also notes some intermittent episodes of forgetfulness that she thinks is r/t aging?  Pt to f/u with PCP about this.  No abdominal pain, emesis, rectal bleeding or melena.

## 2020-07-22 NOTE — REASON FOR VISIT
[Home] : at home, [unfilled] , at the time of the visit. [Medical Office: ([facility])___] : at the medical office located in  [Verbal consent obtained from patient] : the patient, [unfilled] [Follow-Up: _____] : a [unfilled] follow-up visit

## 2020-08-12 ENCOUNTER — APPOINTMENT (OUTPATIENT)
Dept: GASTROENTEROLOGY | Facility: CLINIC | Age: 65
End: 2020-08-12
Payer: MEDICARE

## 2020-08-12 PROCEDURE — 99441: CPT | Mod: 95

## 2020-08-12 NOTE — REASON FOR VISIT
[Home] : at home, [unfilled] , at the time of the visit. [Medical Office: ([facility])___] : at the medical office located in  [Verbal consent obtained from patient] : the patient, [unfilled] [Follow-Up: _____] : a [unfilled] follow-up visit

## 2020-08-12 NOTE — ASSESSMENT
[FreeTextEntry1] : 66 yo female with hx of gerd on ppi bid and pepcid at bedtime.  She reports feeling somewhat better at times.  She has good bad and bad days.  States worse after large meals.  She is going to f/u with pcp about being more forgetful lately.  No abdominal pain, emesis, rectal bleeding or melena. \par \par Impression:\par GERD/gastritis. \par \par Plan:\par Continue current medications. \par If becomes any worse, consider egd. \par F/u with PCP about forgetfulness. \par \par Discussed with Dr. Ansari.

## 2020-08-12 NOTE — HISTORY OF PRESENT ILLNESS
[de-identified] : 66 yo female with hx of gerd on ppi bid and pepcid at bedtime.  She reports feeling somewhat better at times but still have some good bad and bad days.  States worse after large meals but doesn't matter type of food.  She is going to f/u with pcp about being more forgetful lately.  No abdominal pain, emesis, rectal bleeding or melena.

## 2020-08-12 NOTE — REVIEW OF SYSTEMS
[Heartburn] : heartburn [As Noted in HPI] : as noted in HPI [Negative] : Endocrine [de-identified] : forgetful at times.

## 2020-09-30 ENCOUNTER — RX RENEWAL (OUTPATIENT)
Age: 65
End: 2020-09-30

## 2020-10-01 ENCOUNTER — APPOINTMENT (OUTPATIENT)
Dept: ORTHOPEDIC SURGERY | Facility: CLINIC | Age: 65
End: 2020-10-01
Payer: MEDICARE

## 2020-10-01 PROCEDURE — 99213 OFFICE O/P EST LOW 20 MIN: CPT

## 2020-10-01 NOTE — PHYSICAL EXAM
[de-identified] : General: Alert and oriented x3. In no acute distress. Pleasant in nature with a normal affect. No apparent respiratory distress.\par \par L Ankle Exam\par Skin: Clean, dry, intact\par Inspection: +significant posterior prominence. No obvious malalignment, no swelling, no effusion; no lymphadenopathy\par Pulses: 2+ DP/PT pulses\par ROM: L Ankle 10 degrees of dorsiflexion, 40 degrees of plantarflexion, 10 degrees of subtalar motion\par Tenderness: No tenderness over the lateral malleolus, no CFL/ATFL/PTFL pain. No medial malleolus pain, no deltoid ligament pain. No proximal fibular pain. No heel pain.\par Stability: Negative anterior/posterior drawer.\par Strength: 5/5 TA/GS/EHL\par Neuro: In tact to light touch throughout\par Additional tests: Negative Mortons test, Negative syndesmosis squeeze test.  [de-identified] : None new obtained.

## 2020-10-01 NOTE — ADDENDUM
[FreeTextEntry1] : I, Navdeep Navarrete, acted solely as a scribe for Dr. Jamari Haywood on this date 10/01/2020 .\par All medical record entries made by the Scribe were at my, Dr. Jamari Haywood, direction and personally dictated by me on 10/01/2020 . I have reviewed the chart and agree that the record accurately reflects my personal performance of the history, physical exam, assessment and plan. I have also personally directed, reviewed, and agreed with the chart.

## 2020-10-01 NOTE — HISTORY OF PRESENT ILLNESS
[FreeTextEntry1] : 65 year old female presenting with left ankle pain. The patient’s pain is noted to be a 8-9/10. The pain and swelling are noted to be the same compared to the previous visit. The patient also c/o some hip pain when lifting her leg. She is currently taking Tylenol or Advil occasionally. No other complaints at this time.

## 2020-10-01 NOTE — DISCUSSION/SUMMARY
[de-identified] : Today I had a lengthy discussion with the patient regarding their left ankle pain. I have addressed all the patient's concerns surrounding the pathology of their condition. In order to provide the patient with a better understanding of their ailment, I educated them about the anatomy, physiology and lifespan of their condition using a foot model. I recommend the patient undergo a course of physical therapy for the left ankle 2-3 times a week for a total of 6-8 weeks. A prescription was given for the physical therapy today. I advised the patient to utilize gel cup inserts in the heels of their shoes, as well as an OTC dorsal hybrid night splint to facilitate stretching in the evening. At this time I would like to obtain advanced imaging of the patient's left ankle. An MRI was ordered so I can find out more about the etiology of the patient's condition. The patient should follow up with the office after obtaining the MRI. The patient understood and verbally agreed to the treatment plan. All of their questions were answered and they were satisfied with the visit. The patient should call the office if they have any questions or experience worsening symptoms.

## 2020-10-19 ENCOUNTER — APPOINTMENT (OUTPATIENT)
Dept: ORTHOPEDIC SURGERY | Facility: CLINIC | Age: 65
End: 2020-10-19
Payer: MEDICARE

## 2020-10-19 PROCEDURE — 99442: CPT | Mod: 95

## 2020-10-26 ENCOUNTER — APPOINTMENT (OUTPATIENT)
Dept: ORTHOPEDIC SURGERY | Facility: CLINIC | Age: 65
End: 2020-10-26
Payer: MEDICARE

## 2020-10-26 VITALS
BODY MASS INDEX: 31.83 KG/M2 | WEIGHT: 235 LBS | HEART RATE: 65 BPM | DIASTOLIC BLOOD PRESSURE: 83 MMHG | HEIGHT: 72 IN | SYSTOLIC BLOOD PRESSURE: 147 MMHG | TEMPERATURE: 97.1 F

## 2020-10-26 DIAGNOSIS — M19.90 UNSPECIFIED OSTEOARTHRITIS, UNSPECIFIED SITE: ICD-10-CM

## 2020-10-26 DIAGNOSIS — Z86.69 PERSONAL HISTORY OF OTHER DISEASES OF THE NERVOUS SYSTEM AND SENSE ORGANS: ICD-10-CM

## 2020-10-26 DIAGNOSIS — Z72.89 OTHER PROBLEMS RELATED TO LIFESTYLE: ICD-10-CM

## 2020-10-26 DIAGNOSIS — Q25.49 OTHER CONGENITAL MALFORMATIONS OF AORTA: ICD-10-CM

## 2020-10-26 DIAGNOSIS — Z86.79 PERSONAL HISTORY OF OTHER DISEASES OF THE CIRCULATORY SYSTEM: ICD-10-CM

## 2020-10-26 DIAGNOSIS — M25.561 PAIN IN RIGHT KNEE: ICD-10-CM

## 2020-10-26 DIAGNOSIS — Z86.39 PERSONAL HISTORY OF OTHER ENDOCRINE, NUTRITIONAL AND METABOLIC DISEASE: ICD-10-CM

## 2020-10-26 PROCEDURE — 73502 X-RAY EXAM HIP UNI 2-3 VIEWS: CPT | Mod: RT

## 2020-10-26 PROCEDURE — 73560 X-RAY EXAM OF KNEE 1 OR 2: CPT | Mod: RT

## 2020-10-26 PROCEDURE — 99214 OFFICE O/P EST MOD 30 MIN: CPT

## 2020-10-28 NOTE — PHYSICAL EXAM
[de-identified] : Left Hip: \par Range of Motion in Degrees:\par 	                                 Claimant:	   Normal:	\par Flexion (Active) 	                 120 	   120-degrees	\par Flexion (Passive)	                 120	   120-degrees	\par Extension (Active)	                 -30	   -30-degrees	\par Extension (Passive)	 -30	   -30-degrees	\par Abduction (Active)	                 45-50	   35-24-ydjrmrc	\par Abduction (Passive)	 45-50	   31-67-mklwmti	\par Adduction (Active)  	 20-30	   41-49-dclaoqn	\par Adduction (Passive)	 20-30	   30-05-hfyutlk	\par Internal Rotation (Active) 	 35	   35-degrees	\par Internal Rotation (Passive)	 35	   35-degrees	\par External Rotation (Active)	 45	   45-degrees	\par External Rotation (Passive)	 45	   45-degrees	\par \par No tenderness with internal or external rotation or axial load.  No tenderness to palpation over the greater trochanter.  Negative Trendelenburg.  No tenderness with resisted abduction.  No weakness to flexion, extension, abduction or adduction.  No evidence of instability.  No motor or sensory deficits.  2+ DP and PT pulses.  Skin is intact.  No scars, rashes or lesions.  \par \par Right Hip: \par Range of Motion in Degrees:\par 	                                  Claimant:	Normal:	\par Flexion (Active) 	                  120 	                120-degrees	\par Flexion (Passive)	                  120	                120-degrees	\par Extension (Active)	                  -30	                -30-degrees	\par Extension (Passive)	  -30	                -30-degrees	\par Abduction (Active)	                  45-50	                31-30-uswamxg	\par Abduction (Passive)	  45-50	                56-56-ukqgmuv	\par Adduction (Active)	                  20-30	                02-29-mnqgdgb	\par Adduction (Passive)	  20-30	                84-19-ededhje	\par Internal Rotation (Active) 	 35	                35-degrees	\par Internal Rotation (Passive)	 35	                35-degrees	\par External Rotation (Active)	 45	                45-degrees	\par External Rotation (Passive)	 45	                45-degrees	\par \par Tenderness into the groin with internal and external rotation and axial load.  No tenderness to palpation over the greater trochanter.  Negative Trendelenburg.  No tenderness with resisted abduction.  No weakness to flexion, extension, abduction or adduction.  No evidence of instability.  No motor or sensory deficits.  2+ DP and PT pulses.  Skin is intact.  No scars, rashes or lesions.  \par  \par Right Knee: \par Range of Motion in Degrees	\par 	                  Claimant:	Normal:	\par Flexion Active	  135 	                135-degrees	\par Flexion Passive	  135	                135-degrees	\par Extension Active	  0-5	                0-5-degrees	\par Extension Passive	  0-5	                0-5-degrees	\par \par Well healed scar.  Minimal effusion.  Mildly tender over the proximal pole of the patella.  \par \par Left Knee: \par Range of Motion in Degrees	\par 	                  Claimant:	Normal:	\par Flexion Active	  135 	                135-degrees	\par Flexion Passive	  135	                135-degrees	\par Extension Active	  0-5	                0-5-degrees	\par Extension Passive	  0-5	                0-5-degrees	\par \par No weakness to flexion/extension.  No evidence of instability in the AP plane or varus or valgus stress.  Negative  Lachman.  Negative pivot shift.  Negative anterior drawer test.  Negative posterior drawer test.  Negative Irina.  Negative Apley grind.  No medial or lateral joint line tenderness.  No tenderness over the medial and lateral facet of the patella.  No patellofemoral crepitations.  No lateral tilting patella.  No patellar apprehension.  No crepitation in the medial and lateral femoral condyle.  No proximal or distal swelling, edema or tenderness.  No gross motor or sensory deficits.  No intra-articular swelling.  2+ DP and PT pulses. No varus or valgus malalignment.  Skin is intact.  No rashes, scars or lesions.  \par    [de-identified] : Ambulating with a slightly antalgic to antalgic gait.  Station:  Normal.  [de-identified] : Appearance:  Well-developed, well-nourished female in no acute distress.\par   [de-identified] : Radiographs, two views of the right knee, show an old proximal pole of the patella fracture.\par \par Radiographs, two-three views of the right hip, including the pelvis, shows moderate arthritis.

## 2020-10-28 NOTE — DISCUSSION/SUMMARY
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  She will be reassessed in four to six weeks.

## 2020-10-28 NOTE — CONSULT LETTER
[Dear  ___] : Dear  [unfilled], [Consult Letter:] : I had the pleasure of evaluating your patient, [unfilled]. [Please see my note below.] : Please see my note below. [Consult Closing:] : Thank you very much for allowing me to participate in the care of this patient.  If you have any questions, please do not hesitate to contact me. [Sincerely,] : Sincerely, [FreeTextEntry3] : Jm Mitchell III, MD\par [facility]

## 2020-10-28 NOTE — REVIEW OF SYSTEMS
[Joint Pain] : joint pain [Feeling Tired] : fatigue [Heartburn] : heartburn [Urinary Frequency] : urinary frequency [Anxiety] : anxiety [Depression] : depression [Sleep Disturbances] : ~T sleep disturbances [Negative] : Heme/Lymph

## 2020-10-28 NOTE — HISTORY OF PRESENT ILLNESS
[4] : the relief from medicine is 4/10 [5] : the relief from medicine is 5/10 [8] : the ailment interference is 8/10 [9] : the ailment interference is 9/10 [de-identified] : The patient comes in today with complaints referable to her right hip and right knee.  She had some pain to the groin to the outside that has gotten somewhat worse at times.  She had a history of having a total knee replacement, a subsequent fall and a fracture of her patella, but she stated her doctor told her that the poly was not out of place and to "live with it".  The patient states the pain is localized and radiating (it varies).  The patient notes that her right hip pain is aching and shooting.  She notes it feels like her hip pops in and out of its socket.  The patient notes that her right knee has an intense ache and thigh muscle above feels like it knots up.  The patient notes that medication takes the edge off (but not always comfortably so), while walking, standing or trying to lift the right leg, makes her symptoms worse.  The patient indicates a pain level of 3-9 on a pain scale of 0-10.  [] : No [de-identified] : Tylenol [de-identified] : Advil

## 2020-10-28 NOTE — ADDENDUM
[FreeTextEntry1] : This note was written by Zenaida Tomas on 10/28/2020 acting as a scribe for TE NUNEZ III, MD

## 2020-11-02 ENCOUNTER — TRANSCRIPTION ENCOUNTER (OUTPATIENT)
Age: 65
End: 2020-11-02

## 2020-11-06 ENCOUNTER — APPOINTMENT (OUTPATIENT)
Dept: ORTHOPEDIC SURGERY | Facility: CLINIC | Age: 65
End: 2020-11-06
Payer: MEDICARE

## 2020-11-06 VITALS
TEMPERATURE: 98.4 F | WEIGHT: 235 LBS | DIASTOLIC BLOOD PRESSURE: 87 MMHG | SYSTOLIC BLOOD PRESSURE: 150 MMHG | BODY MASS INDEX: 41.64 KG/M2 | HEART RATE: 68 BPM | HEIGHT: 63 IN

## 2020-11-06 DIAGNOSIS — S83.281A OTHER TEAR OF LATERAL MENISCUS, CURRENT INJURY, RIGHT KNEE, INITIAL ENCOUNTER: ICD-10-CM

## 2020-11-06 PROCEDURE — 99213 OFFICE O/P EST LOW 20 MIN: CPT

## 2020-11-06 PROCEDURE — 72170 X-RAY EXAM OF PELVIS: CPT | Mod: 26

## 2020-11-09 ENCOUNTER — APPOINTMENT (OUTPATIENT)
Dept: OBGYN | Facility: CLINIC | Age: 65
End: 2020-11-09
Payer: MEDICARE

## 2020-11-09 VITALS
HEIGHT: 63 IN | SYSTOLIC BLOOD PRESSURE: 140 MMHG | BODY MASS INDEX: 40.75 KG/M2 | WEIGHT: 230 LBS | DIASTOLIC BLOOD PRESSURE: 80 MMHG | TEMPERATURE: 98.2 F

## 2020-11-09 DIAGNOSIS — Z86.39 PERSONAL HISTORY OF OTHER ENDOCRINE, NUTRITIONAL AND METABOLIC DISEASE: ICD-10-CM

## 2020-11-09 DIAGNOSIS — N95.2 POSTMENOPAUSAL ATROPHIC VAGINITIS: ICD-10-CM

## 2020-11-09 PROCEDURE — G0101: CPT

## 2020-11-09 NOTE — HISTORY OF PRESENT ILLNESS
[FreeTextEntry1] : Patient is a 65-year-old female presents for a gynecologic evaluation. Patient has no complaints. Patient's last mammogram was one year ago with bone density was February 2019.patient states she has vaginal dryness. Discussed this issue advised treatment with vaginal estrogen cream

## 2020-11-10 ENCOUNTER — EMERGENCY (EMERGENCY)
Facility: HOSPITAL | Age: 65
LOS: 0 days | Discharge: HOME | End: 2020-11-10
Attending: STUDENT IN AN ORGANIZED HEALTH CARE EDUCATION/TRAINING PROGRAM
Payer: MEDICARE

## 2020-11-10 VITALS
TEMPERATURE: 98 F | DIASTOLIC BLOOD PRESSURE: 99 MMHG | HEART RATE: 62 BPM | OXYGEN SATURATION: 96 % | SYSTOLIC BLOOD PRESSURE: 156 MMHG | RESPIRATION RATE: 17 BRPM

## 2020-11-10 VITALS — WEIGHT: 233.03 LBS | HEIGHT: 63 IN

## 2020-11-10 DIAGNOSIS — G89.29 OTHER CHRONIC PAIN: ICD-10-CM

## 2020-11-10 DIAGNOSIS — I71.9 AORTIC ANEURYSM OF UNSPECIFIED SITE, WITHOUT RUPTURE: ICD-10-CM

## 2020-11-10 DIAGNOSIS — M19.90 UNSPECIFIED OSTEOARTHRITIS, UNSPECIFIED SITE: ICD-10-CM

## 2020-11-10 DIAGNOSIS — Z87.440 PERSONAL HISTORY OF URINARY (TRACT) INFECTIONS: ICD-10-CM

## 2020-11-10 DIAGNOSIS — G47.00 INSOMNIA, UNSPECIFIED: ICD-10-CM

## 2020-11-10 DIAGNOSIS — M25.571 PAIN IN RIGHT ANKLE AND JOINTS OF RIGHT FOOT: ICD-10-CM

## 2020-11-10 DIAGNOSIS — M76.62 ACHILLES TENDINITIS, LEFT LEG: ICD-10-CM

## 2020-11-10 DIAGNOSIS — M54.16 RADICULOPATHY, LUMBAR REGION: ICD-10-CM

## 2020-11-10 DIAGNOSIS — Z90.49 ACQUIRED ABSENCE OF OTHER SPECIFIED PARTS OF DIGESTIVE TRACT: Chronic | ICD-10-CM

## 2020-11-10 DIAGNOSIS — G90.2 HORNER'S SYNDROME: ICD-10-CM

## 2020-11-10 DIAGNOSIS — I35.1 NONRHEUMATIC AORTIC (VALVE) INSUFFICIENCY: ICD-10-CM

## 2020-11-10 DIAGNOSIS — Z98.84 BARIATRIC SURGERY STATUS: ICD-10-CM

## 2020-11-10 DIAGNOSIS — F32.9 MAJOR DEPRESSIVE DISORDER, SINGLE EPISODE, UNSPECIFIED: ICD-10-CM

## 2020-11-10 DIAGNOSIS — I10 ESSENTIAL (PRIMARY) HYPERTENSION: ICD-10-CM

## 2020-11-10 DIAGNOSIS — Z96.651 PRESENCE OF RIGHT ARTIFICIAL KNEE JOINT: ICD-10-CM

## 2020-11-10 DIAGNOSIS — K57.90 DIVERTICULOSIS OF INTESTINE, PART UNSPECIFIED, WITHOUT PERFORATION OR ABSCESS WITHOUT BLEEDING: ICD-10-CM

## 2020-11-10 DIAGNOSIS — D64.9 ANEMIA, UNSPECIFIED: ICD-10-CM

## 2020-11-10 DIAGNOSIS — E66.01 MORBID (SEVERE) OBESITY DUE TO EXCESS CALORIES: ICD-10-CM

## 2020-11-10 DIAGNOSIS — E11.9 TYPE 2 DIABETES MELLITUS WITHOUT COMPLICATIONS: ICD-10-CM

## 2020-11-10 DIAGNOSIS — K21.9 GASTRO-ESOPHAGEAL REFLUX DISEASE WITHOUT ESOPHAGITIS: ICD-10-CM

## 2020-11-10 DIAGNOSIS — I25.10 ATHEROSCLEROTIC HEART DISEASE OF NATIVE CORONARY ARTERY WITHOUT ANGINA PECTORIS: ICD-10-CM

## 2020-11-10 DIAGNOSIS — M85.80 OTHER SPECIFIED DISORDERS OF BONE DENSITY AND STRUCTURE, UNSPECIFIED SITE: ICD-10-CM

## 2020-11-10 DIAGNOSIS — E03.9 HYPOTHYROIDISM, UNSPECIFIED: ICD-10-CM

## 2020-11-10 DIAGNOSIS — E78.5 HYPERLIPIDEMIA, UNSPECIFIED: ICD-10-CM

## 2020-11-10 DIAGNOSIS — Z90.49 ACQUIRED ABSENCE OF OTHER SPECIFIED PARTS OF DIGESTIVE TRACT: ICD-10-CM

## 2020-11-10 DIAGNOSIS — R16.1 SPLENOMEGALY, NOT ELSEWHERE CLASSIFIED: ICD-10-CM

## 2020-11-10 DIAGNOSIS — M25.552 PAIN IN LEFT HIP: ICD-10-CM

## 2020-11-10 DIAGNOSIS — F41.9 ANXIETY DISORDER, UNSPECIFIED: ICD-10-CM

## 2020-11-10 DIAGNOSIS — Z88.2 ALLERGY STATUS TO SULFONAMIDES: ICD-10-CM

## 2020-11-10 DIAGNOSIS — Z88.8 ALLERGY STATUS TO OTHER DRUGS, MEDICAMENTS AND BIOLOGICAL SUBSTANCES: ICD-10-CM

## 2020-11-10 PROCEDURE — 96372 THER/PROPH/DIAG INJ SC/IM: CPT

## 2020-11-10 PROCEDURE — 99284 EMERGENCY DEPT VISIT MOD MDM: CPT

## 2020-11-10 PROCEDURE — 99284 EMERGENCY DEPT VISIT MOD MDM: CPT | Mod: 25

## 2020-11-10 RX ORDER — CYCLOBENZAPRINE HYDROCHLORIDE 10 MG/1
1 TABLET, FILM COATED ORAL
Qty: 20 | Refills: 0
Start: 2020-11-10

## 2020-11-10 RX ORDER — CYCLOBENZAPRINE HYDROCHLORIDE 10 MG/1
10 TABLET, FILM COATED ORAL ONCE
Refills: 0 | Status: COMPLETED | OUTPATIENT
Start: 2020-11-10 | End: 2020-11-10

## 2020-11-10 RX ORDER — KETOROLAC TROMETHAMINE 30 MG/ML
30 SYRINGE (ML) INJECTION ONCE
Refills: 0 | Status: DISCONTINUED | OUTPATIENT
Start: 2020-11-10 | End: 2020-11-10

## 2020-11-10 RX ADMIN — Medication 30 MILLIGRAM(S): at 18:17

## 2020-11-10 RX ADMIN — CYCLOBENZAPRINE HYDROCHLORIDE 10 MILLIGRAM(S): 10 TABLET, FILM COATED ORAL at 18:17

## 2020-11-10 NOTE — ED STATDOCS - PSH
Acquired female bladder prolapse    Benign cyst of skin    Failed total right knee replacement    H/O abdominoplasty    H/O arthroscopy of left knee    H/O arthroscopy of right knee    H/O bilateral breast reduction surgery    H/O dilation and curettage    Hx of gastric bypass    S/P cholecystectomy

## 2020-11-10 NOTE — ED STATDOCS - CHPI ED SYMPTOM NEG
-urinary complaints, -B&B incontinence, -weakness/no fever no fever/-urinary complaints, -bowel or bladder incontinence, -weakness

## 2020-11-10 NOTE — ED STATDOCS - NSFOLLOWUPINSTRUCTIONS_ED_ALL_ED_FT
Lumbar Radiculopathy    WHAT YOU NEED TO KNOW:    Lumbar radiculopathy is a painful condition that happens when a nerve in your lumbar spine (lower back) is pinched or irritated. Nerves control feeling and movement in your body. You may have numbness or pain that shoots down from your lower back towards your foot.    DISCHARGE INSTRUCTIONS:    Medicines:   •Medicines:?NSAIDs, such as ibuprofen, help decrease swelling, pain, and fever. This medicine is available with or without a doctor's order. NSAIDs can cause stomach bleeding or kidney problems in certain people. If you take blood thinner medicine, always ask your healthcare provider if NSAIDs are safe for you. Always read the medicine label and follow directions.      ?Muscle relaxers help decrease pain and muscle spasms.      ?Opioids: This is a strong medicine given to reduce severe pain. It is also called narcotic pain medicine. Take this medicine exactly as directed by your healthcare provider.      ?Oral steroids: Steroids may also be given to reduce pain and swelling.      ?Take your medicine as directed. Contact your healthcare provider if you think your medicine is not helping or if you have side effects. Tell him of her if you are allergic to any medicine. Keep a list of the medicines, vitamins, and herbs you take. Include the amounts, and when and why you take them. Bring the list or the pill bottles to follow-up visits. Carry your medicine list with you in case of an emergency.          Follow up with your healthcare provider or spine specialist within 1 to 3 weeks: After your first follow-up appointment, return to your healthcare provider or spine specialist every 2 weeks until you have healed. Ask for information about physical therapy for your condition. Write down your questions so you remember to ask them during your visits.    Physical therapy: You may need physical therapy to improve your condition. Your physical therapist may teach you certain exercises to improve posture (the way you stand and sit), flexibility, and strength in your lower back.     Self care:   •Stay active: It is best to be active when you have lumbar radiculopathy. Your physical therapist or healthcare provider may tell you to take walks to ease yourself back into your daily routine. Avoid long periods of bed rest. Bed rest could worsen your symptoms. Do not move in ways that increase your pain. Ask for more information about the best ways to stay active.       •Use ice or heat packs: Use ice or heat packs as directed on the sore area of your body to decrease the pain and swelling. Put ice in a plastic bag covered with a towel on your low back. Cover heated items with a towel to avoid burns. Use ice and heat as directed.      •Avoid heavy lifting: Your condition may worsen if you lift heavy things. Avoid lifting if possible.      •Maintain a healthy weight: Excess body weight may strain your back. Talk with your healthcare provider about ways to lose excess weight if you are overweight.       Contact your healthcare provider or spine specialist if:   •Your pain does not improve within 1 to 3 weeks after treatment.      •Your pain and weakness keep you from your normal activities at work, home, or school.      •You lose more than 10 pounds in 6 months without trying.      •You become depressed or sad because of the pain.      •You have questions or concerns about your condition or care.      Return to the emergency department if:   •You have a fever greater than 100.4°F for longer than 2 days.      •You have new, severe back or leg pain, or your pain spreads to both legs.      •You have any new signs of numbness or weakness, especially in your lower back, legs, arms, or genital area.      •You have new trouble controlling your urine and bowel movements.      •You do not feel like your bladder empties when you urinate.          CALL DR LALA'S OFFICE TOMORROW FOR A SOONER APPOINTMENT. DR LALA IS AWARE YOU WERE IN THE ER TODAY AND ADVISED TO CALL THE OFFICE TOMORROW. RETURN TO ER FOR ANY WORSENING SYMPTOMS OR NEW CONCERNS.

## 2020-11-10 NOTE — ED STATDOCS - PATIENT PORTAL LINK FT
You can access the FollowMyHealth Patient Portal offered by [facility] by registering at the following website: [email or web address]. By joining [facility]’s FollowMyHealth portal, you will also be able to view your health information using other applications (apps) compatible with our system.

## 2020-11-10 NOTE — ED ADULT TRIAGE NOTE - CHIEF COMPLAINT QUOTE
pt a/ox3, c/o severe BL leg pain. pt reports "I was suppose to follow up with a orthopedist and spine specialist but could not get appt early enough. I was suppose to do PT but I can not." pt reports taking Advil PTA, no relief.

## 2020-11-10 NOTE — ED STATDOCS - PMH
Achilles tendinosis of left lower extremity    Anemia    Anxiety    Aortic aneurysm without rupture    CAD (coronary artery disease)    Chronic pain of right ankle    Chronic UTI (urinary tract infection)    Congenital anomaly of aorta    Depression    Diverticulosis    DM2 (diabetes mellitus, type 2)    Gallstones    Gastric bypass status for obesity  sept 2003  GERD (gastroesophageal reflux disease)    Hemorrhoids    Hernia    HLD (hyperlipidemia)    Hanny's syndrome    HTN (hypertension)    Hypothyroid    Insomnia    Mitral valve regurgitation    Morbid obesity    Osteoarthritis    Osteopenia    Peroneal tendinitis of left lower extremity    Polyp of corpus uteri    Sleep apnea    Splenomegaly    Tear of lateral meniscus of right knee

## 2020-11-10 NOTE — ED STATDOCS - ATTENDING CONTRIBUTION TO CARE
I, Annemarie Awad DO,  performed the initial face to face bedside interview with this patient regarding history of present illness, review of symptoms and relevant past medical, social and family history.  I completed an independent physical examination.  I was the initial provider who evaluated this patient. I have signed out the follow up of any pending tests (i.e. labs, radiological studies) to the ACP.  I have communicated the patient’s plan of care and disposition with the ACP.  The history, relevant review of systems, past medical and surgical history, medical decision making, and physical examination was documented by the scribe in my presence and I attest to the accuracy of the documentation.

## 2020-11-10 NOTE — ED STATDOCS - PROGRESS NOTE DETAILS
64 y/o F with PMH of HTN, DM, CAD, right achilles tendinosis, s/p right total knee presents with groin pain radiating into bilateral LE x 2-3 weeks. States she is being followed by Dr. Teran and Dr. Mitchell. Scheduled for MRI of left hip tomorrow. States she has follow up with Dr. Teran in 2-3 weeks. Called his office today to attempt to expedite follow up due to pain, was advised to go to ED. Denies fever, chills, recent epidural or intraarticular injections, numbness/tingling in lower extremities, loss of bowel/bladder control. PE tearful, uncomfortable appearing. Cardiac: s1s2, RRR. Lungs: CTAB. Abdomen: NBS x4, soft, nontender. Neuro: CN II-XII intact. Sensation intact to light touch in all extremities. 5/5 strength in all extremities. MSK: +right TKA scar. NO TTP in LE joints. No obvious deformity in lower extremities. A/P: Chronic pain, plan for analgesia, will call neurosurgery to attempt to expedite follow up. - Jordin Miranda PA-C Delmi HOLT: NS contacted as staff instructed patient to come to ED; will come to bedside to see patient. Delmi DO: S/o to Dr. Pat pending NS eval at this time. signed Cat Castillo PA-C Received patient in sign out from YAHIR Miranda.   I spoke to neurosx YAHIR Zepeda who states she wasn't given signout about an ER consult. She discussed the case with Dr Teran, who has not seen pt in office yet but advises pt may call office tomorrow for faster outpt appt. Pt feeling better after meds in ED, will send rx for flexeril. Pt feeling well at DC, agrees with DC and plan of care. signed Cat Castillo PA-C Received patient in sign out from YAHIR Miranda.   I spoke to neurosx YAHIR Zepeda, she discussed the case with Dr Teran, who has not seen pt in office yet but advises pt may call office tomorrow for faster outpt appt. Pt feeling better after meds in ED, will send rx for flexeril. Pt feeling well at DC, agrees with DC and plan of care. signed Cat Castillo PA-C   Neurosx YAHIR Zepeda saw pt in ED, she will try to help expedite pts appt as outpt.

## 2020-11-10 NOTE — ED STATDOCS - CARE PROVIDER_API CALL
Kboi Teran  [address], [address] [place] [address]  Phone: [phone]  Fax: [phone]  Follow Up Time:

## 2020-11-10 NOTE — CHART NOTE - NSCHARTNOTEFT_GEN_A_CORE
Patient scheduled to see Dr. Teran in his office for suspected low back pain as cause of referred hip pain in about 2 weeks. Has appointment for MRI tomorrow of her left hip. She has been experiencing low back and groin pain as well as b/l hip and thigh pain. Complicated orthopedic history of left foot injury (achilles tendon) and right TKR and right patellar dislocation. patient states pain at present time is in "honeymoon" period of the day but also given pain medication in ER to control. No imaging aside from CT a/p done in 6/2020 to review. No acute injuries. Dr. Teran reviewed previous imaging and no acute findings in the lumbar spine. Would recommend MRI L-spine as outpatient. Patient advised to call office to see if that can be arranged with her MRI hip tomorrow.     No neurosurgical intervention.

## 2020-11-10 NOTE — ED STATDOCS - NS ED ATTENDING STATEMENT MOD
Seizures
I have personally performed a face to face diagnostic evaluation on this patient. I have reviewed the ACP note and agree with the history, exam and plan of care, except as noted.

## 2020-11-10 NOTE — ED STATDOCS - OBJECTIVE STATEMENT
66 y/o F with PMHx of HTN, HLD, DM2, CAD, GERD, ZHANNA, aortic aneurysm without rupture, Hanny's syndrome, diverticulosis, anemia, hypothyroid, osteoarthritis, osteopenia, chronic UTI, achilles and peroneal tendinitis of LLE, chronic R ankle pain, anxiety, depression, s/p cholecystectomy, and s/p b/l TKR presents to the ED c/o intermittent +b/l groin pain radiating down b/l LE x 2-3 weeks. seen by [facility] it was r hip had eval, due to have mri l hip tomorrow 2-3 weeks b/l groind pain radidint down lefs worsening with movement and positional changes, intermittent. Was told to f/u with spine has appt at the end of the month cannot wair. No B&B incontinence, weakness, urinary complaints, or fever. Took Advil PTA without relief. Former smoker. Allergic to Benadryl and sulfa drugs. PCP: Dr. Daniele Garcia. 66 y/o F with PMHx of HTN, HLD, DM2, CAD, GERD, ZHANNA, aortic aneurysm without rupture, Hanny's syndrome, diverticulosis, anemia, hypothyroid, osteoarthritis, osteopenia, chronic UTI, achilles and peroneal tendinitis of LLE, chronic R ankle pain, anxiety, depression, s/p cholecystectomy, and s/p b/l TKR presents to the ED c/o intermittent +b/l groin pain radiating down b/l LE x 2-3 weeks. Pain worsens with movement and positional changes. Reports being dx with achilles tendinitis of LLE months ago, then developed R knee pain, was seen by specialist and thought it was 2/2 to R hip which she had evaluated and is due to have MRI of L hip tomorrow. Was told to f/u with spine and has an appt at the end of the month cannot wait. No B&B incontinence, weakness, urinary complaints, or fever. Took Advil PTA without relief. Former smoker. Allergic to Benadryl and sulfa drugs. PCP: Dr. Daniele Garcia. 64 y/o F with PMHx of HTN, HLD, DM2, CAD, GERD, ZHANNA, aortic aneurysm without rupture, Hanny's syndrome, diverticulosis, anemia, hypothyroid, osteoarthritis, osteopenia, chronic UTI, achilles and peroneal tendinitis of LLE, chronic R ankle pain, anxiety, depression, s/p cholecystectomy, and s/p b/l TKR presents to the ED c/o intermittent +b/l groin pain radiating down b/l LE x 2-3 weeks. Pain worsens with movement and positional changes. Reports being dx with achilles tendinitis of LLE months ago, then developed R knee pain, was seen by specialist and thought it was 2/2 to R hip which she had evaluated and is due to have MRI of L hip tomorrow. Was told to f/u with spine and has an appt at the end of the month cannot wait. No bowel or bladder incontinence, weakness, urinary complaints, or fever. Took Advil PTA without relief. Former smoker. Allergic to Benadryl and sulfa drugs. PCP: Dr. Daniele Garcia.

## 2020-11-10 NOTE — ED STATDOCS - CHPI ED TIMING
If provider orders tests at today's visit, patient would like to be contacted via Telephone.  If to contact patient by phone, patient's preferred phone # is [phone] (Cell) and it is OK to leave message on voice mail or with family member.  If medications are ordered at today's visit, the pharmacy name/location patient would like them to be sent to is [facility] [place]       intermittent

## 2020-11-11 LAB — HPV HIGH+LOW RISK DNA PNL CVX: NOT DETECTED

## 2020-11-12 DIAGNOSIS — G89.29 DORSALGIA, UNSPECIFIED: ICD-10-CM

## 2020-11-12 DIAGNOSIS — M54.9 DORSALGIA, UNSPECIFIED: ICD-10-CM

## 2020-11-12 PROBLEM — S83.281A TEAR OF LATERAL MENISCUS OF RIGHT KNEE, CURRENT, UNSPECIFIED TEAR TYPE, INITIAL ENCOUNTER: Status: ACTIVE | Noted: 2020-11-06

## 2020-11-12 NOTE — HISTORY OF PRESENT ILLNESS
[de-identified] : The patient comes in today with complaints of left groin pain and then she is complaining of muscle tightness in her left thigh, as well as weakness and radiating pain down the leg.

## 2020-11-12 NOTE — PHYSICAL EXAM
[de-identified] : Left Hip: \par Range of Motion in Degrees:\par 	                                  Claimant:	Normal:	\par Flexion (Active) 	                  120 	                120-degrees	\par Flexion (Passive)	                  120	                120-degrees	\par Extension (Active)	                  -30	                -30-degrees	\par Extension (Passive)	  -30	                -30-degrees	\par Abduction (Active)	                  45-50	                17-77-yyajzca	\par Abduction (Passive)	  45-50	                99-18-vcnslcz	\par Adduction (Active)	                  20-30	                59-17-beztwwq	\par Adduction (Passive)	  20-30	                51-91-nliowae	\par Internal Rotation (Active) 	 35	                35-degrees	\par Internal Rotation (Passive)	 35	                35-degrees	\par External Rotation (Active)	 45	                45-degrees	\par External Rotation (Passive)	 45	                45-degrees	\par \par Tenderness into the groin with internal and external rotation and axial load.  No tenderness to palpation over the greater trochanter.  Negative Trendelenburg.  No tenderness with resisted abduction.  No weakness to flexion, extension, abduction or adduction.  No evidence of instability.  No motor or sensory deficits.  2+ DP and PT pulses.  Skin is intact.  No scars, rashes or lesions.  \par  [de-identified] : Ambulating with a slightly antalgic to antalgic gait.  Station:  Normal.  [de-identified] : Appearance:  Well-developed, well-nourished female in no acute distress.\par   [de-identified] : Radiographs of the pelvis, which were taken previously, reveals mild arthritis.

## 2020-11-12 NOTE — ADDENDUM
[FreeTextEntry1] : This note was written by Zenaida Tomas on 11/11/2020 acting as scribe for Vale Rojas, OTR/L, PA

## 2020-11-13 PROBLEM — Q25.40 CONGENITAL MALFORMATION OF AORTA UNSPECIFIED: Chronic | Status: ACTIVE | Noted: 2020-11-10

## 2020-11-13 PROBLEM — N39.0 URINARY TRACT INFECTION, SITE NOT SPECIFIED: Chronic | Status: ACTIVE | Noted: 2020-11-10

## 2020-11-13 PROBLEM — K57.90 DIVERTICULOSIS OF INTESTINE, PART UNSPECIFIED, WITHOUT PERFORATION OR ABSCESS WITHOUT BLEEDING: Chronic | Status: ACTIVE | Noted: 2020-11-10

## 2020-11-13 PROBLEM — M85.80 OTHER SPECIFIED DISORDERS OF BONE DENSITY AND STRUCTURE, UNSPECIFIED SITE: Chronic | Status: ACTIVE | Noted: 2020-11-10

## 2020-11-13 PROBLEM — K64.9 UNSPECIFIED HEMORRHOIDS: Chronic | Status: ACTIVE | Noted: 2020-11-10

## 2020-11-13 PROBLEM — E03.9 HYPOTHYROIDISM, UNSPECIFIED: Chronic | Status: ACTIVE | Noted: 2020-11-10

## 2020-11-13 PROBLEM — S83.281A OTHER TEAR OF LATERAL MENISCUS, CURRENT INJURY, RIGHT KNEE, INITIAL ENCOUNTER: Chronic | Status: ACTIVE | Noted: 2020-11-10

## 2020-11-13 PROBLEM — D64.9 ANEMIA, UNSPECIFIED: Chronic | Status: ACTIVE | Noted: 2020-11-10

## 2020-11-13 PROBLEM — M67.88 OTHER SPECIFIED DISORDERS OF SYNOVIUM AND TENDON, OTHER SITE: Chronic | Status: ACTIVE | Noted: 2020-11-10

## 2020-11-13 PROBLEM — E78.5 HYPERLIPIDEMIA, UNSPECIFIED: Chronic | Status: ACTIVE | Noted: 2020-11-10

## 2020-11-13 PROBLEM — M76.72 PERONEAL TENDINITIS, LEFT LEG: Chronic | Status: ACTIVE | Noted: 2020-11-10

## 2020-11-13 PROBLEM — G47.00 INSOMNIA, UNSPECIFIED: Chronic | Status: ACTIVE | Noted: 2020-11-10

## 2020-11-13 PROBLEM — M25.571 PAIN IN RIGHT ANKLE AND JOINTS OF RIGHT FOOT: Chronic | Status: ACTIVE | Noted: 2020-11-10

## 2020-11-13 PROBLEM — N84.0 POLYP OF CORPUS UTERI: Chronic | Status: ACTIVE | Noted: 2020-11-10

## 2020-11-13 PROBLEM — G90.2 HORNER'S SYNDROME: Chronic | Status: ACTIVE | Noted: 2020-11-10

## 2020-11-13 PROBLEM — E66.01 MORBID (SEVERE) OBESITY DUE TO EXCESS CALORIES: Chronic | Status: ACTIVE | Noted: 2020-11-10

## 2020-11-13 PROBLEM — I34.0 NONRHEUMATIC MITRAL (VALVE) INSUFFICIENCY: Chronic | Status: ACTIVE | Noted: 2020-11-10

## 2020-11-13 PROBLEM — K46.9 UNSPECIFIED ABDOMINAL HERNIA WITHOUT OBSTRUCTION OR GANGRENE: Chronic | Status: ACTIVE | Noted: 2020-11-10

## 2020-11-13 PROBLEM — I25.10 ATHEROSCLEROTIC HEART DISEASE OF NATIVE CORONARY ARTERY WITHOUT ANGINA PECTORIS: Chronic | Status: ACTIVE | Noted: 2020-11-10

## 2020-11-16 ENCOUNTER — APPOINTMENT (OUTPATIENT)
Dept: ORTHOPEDIC SURGERY | Facility: CLINIC | Age: 65
End: 2020-11-16
Payer: MEDICARE

## 2020-11-16 PROCEDURE — 99441: CPT | Mod: 95

## 2020-11-23 ENCOUNTER — NON-APPOINTMENT (OUTPATIENT)
Age: 65
End: 2020-11-23

## 2020-11-25 ENCOUNTER — TRANSCRIPTION ENCOUNTER (OUTPATIENT)
Age: 65
End: 2020-11-25

## 2020-11-27 ENCOUNTER — OUTPATIENT (OUTPATIENT)
Dept: OUTPATIENT SERVICES | Facility: HOSPITAL | Age: 65
LOS: 1 days | End: 2020-11-27
Payer: MEDICARE

## 2020-11-27 ENCOUNTER — APPOINTMENT (OUTPATIENT)
Dept: NEUROSURGERY | Facility: CLINIC | Age: 65
End: 2020-11-27
Payer: MEDICARE

## 2020-11-27 ENCOUNTER — APPOINTMENT (OUTPATIENT)
Dept: RADIOLOGY | Facility: CLINIC | Age: 65
End: 2020-11-27
Payer: MEDICARE

## 2020-11-27 VITALS
TEMPERATURE: 97.8 F | HEART RATE: 84 BPM | BODY MASS INDEX: 40.4 KG/M2 | OXYGEN SATURATION: 93 % | DIASTOLIC BLOOD PRESSURE: 79 MMHG | SYSTOLIC BLOOD PRESSURE: 114 MMHG | HEIGHT: 63 IN | WEIGHT: 228 LBS

## 2020-11-27 DIAGNOSIS — M43.16 SPONDYLOLISTHESIS, LUMBAR REGION: ICD-10-CM

## 2020-11-27 DIAGNOSIS — Z90.49 ACQUIRED ABSENCE OF OTHER SPECIFIED PARTS OF DIGESTIVE TRACT: Chronic | ICD-10-CM

## 2020-11-27 PROCEDURE — 72100 X-RAY EXAM L-S SPINE 2/3 VWS: CPT

## 2020-11-27 PROCEDURE — 99215 OFFICE O/P EST HI 40 MIN: CPT

## 2020-11-27 PROCEDURE — 72100 X-RAY EXAM L-S SPINE 2/3 VWS: CPT | Mod: 26

## 2020-11-27 RX ORDER — METHYLPREDNISOLONE 4 MG/1
4 TABLET ORAL
Qty: 1 | Refills: 0 | Status: COMPLETED | COMMUNITY
Start: 2020-11-12 | End: 2020-11-27

## 2020-11-27 NOTE — REVIEW OF SYSTEMS
[Feeling Tired] : feeling tired [Anxiety] : anxiety [Depression] : depression [Joint Pain] : joint pain [Joint Swelling] : joint swelling [Negative] : Heme/Lymph

## 2020-11-27 NOTE — CONSULT LETTER
[Dear  ___] : Dear  [unfilled], [Courtesy Letter:] : I had the pleasure of seeing your patient, [unfilled], in my office today. [Sincerely,] : Sincerely, [FreeTextEntry2] : Daniele Garcia MD\par [address]\par [place] [address]\par  [FreeTextEntry1] : Ms. Salazar is a very pleasant 65-year-old female patient who was seen in our office today in regards to lower extremity pain.\par \par The patient endorses a longstanding history of ankle pain on the left secondary to Achilles tendinosis.  The patient has also had a previous right sided knee replacement and states that she believes the pain in her groin and thighs and knees began after she started favoring one leg or the other.  The patient has been actually dealing with lower extremity pain for several years with good results using conservative therapy.  In October 2020, the patient developed severe pain in the groin bilaterally.  The patient states that her pain on the left significantly worse than the right.  The patient had trouble with any movement of the hip bilaterally even  when sitting down or recumbent.  The patient also described pain radiating upwards from her knees towards her hips in the thigh region bilaterally.  This pain appeared to be unrelated to activity.  The patient has been in contact with our office since her emergency visit and has been prescribed a Medrol Dosepak, meloxicam, and cyclobenzaprine.  The patient states that her pain is actually well managed on this pain regimen.  Currently, the patient states that her pain is a 3/10 in severity when it was previously 10/10 in severity.\par \par The patient's past medical history is significant for hypertension, depression, anxiety, and sleep apnea.  The patient's current medical regimen includes quinapril, hydrochlorothiazide, metoprolol, atorvastatin, amlodipine, pantoprazole, famotidine, oxybutynin, nitrofurantoin, sertraline, diazepam, Zyrtec, trazodone, and baby aspirin.  The patient also takes several multivitamins.  The patient states that she has allergies to Benadryl and sulfa medications\par \par On examination, the patient is alert, oriented, and compliant with the exam.  The patient demonstrates 5/5 strength in the lower extremities bilaterally.   I am unable to obtain reflexes in the lower extremities bilaterally.  The patient ambulates with an antalgic gait.\par \par The patient is associated with MRI scan of the lumbar spine performed on November 11, 2020.  These images demonstrate moderate to severe degenerative changes at L5/S1 with Modic changes.  The patient also has a grade 1 spondylolisthesis at L4/5.  He should also be noted that the patient has a lumbarized S1.  On these images, there is no evidence of nerve root, or cauda equina compression.  Foraminal and central stenosis is noted at L4/5 and L5/S1, however.  With respect to the patient's symptoms, the L1/2 regions show little to no degenerative changes.\par \par Taken together, the patient has a clinical history and radiographic findings most consistent with musculoskeletal causes for her lower extremity pain.  At this time, there is no structural lesion in the lumbar spine capable of causing bilateral groin pain given that there is little to no degenerative changes at the L1/2 region.  The patient does have foraminal and central stenosis at L4/5 with a spondylolisthesis but without compression when recumbent.  I have recommended flexion/extension x-rays to ensure that there is no evidence of dynamic instability severe enough to cause neurogenic claudication symptoms, though my suspicion is low.  Regardless of the imaging findings, the patient's clinical symptoms are atypical for neurogenic claudication.  At this time, we will be in contact with the patient with regards to her imaging findings when they become available to review. [FreeTextEntry3] : Kobi Teran MD, PhD, FRCPSC \par Attending Neurosurgeon \par [facility] \par [address], [address] floor \[place] [address] \par Office: [phone] \par Fax: [phone]\par \par

## 2020-11-29 ENCOUNTER — RESULT CHARGE (OUTPATIENT)
Age: 65
End: 2020-11-29

## 2020-11-30 ENCOUNTER — APPOINTMENT (OUTPATIENT)
Dept: ORTHOPEDIC SURGERY | Facility: CLINIC | Age: 65
End: 2020-11-30
Payer: MEDICARE

## 2020-11-30 PROCEDURE — 99441: CPT | Mod: 95

## 2020-12-02 DIAGNOSIS — R39.9 UNSPECIFIED SYMPTOMS AND SIGNS INVOLVING THE GENITOURINARY SYSTEM: ICD-10-CM

## 2020-12-09 ENCOUNTER — APPOINTMENT (OUTPATIENT)
Dept: ORTHOPEDIC SURGERY | Facility: CLINIC | Age: 65
End: 2020-12-09
Payer: MEDICARE

## 2020-12-09 VITALS
SYSTOLIC BLOOD PRESSURE: 106 MMHG | WEIGHT: 225 LBS | HEIGHT: 63 IN | TEMPERATURE: 95.6 F | BODY MASS INDEX: 39.87 KG/M2 | HEART RATE: 61 BPM | DIASTOLIC BLOOD PRESSURE: 72 MMHG

## 2020-12-09 DIAGNOSIS — M17.12 UNILATERAL PRIMARY OSTEOARTHRITIS, LEFT KNEE: ICD-10-CM

## 2020-12-09 DIAGNOSIS — M16.12 UNILATERAL PRIMARY OSTEOARTHRITIS, LEFT HIP: ICD-10-CM

## 2020-12-09 PROCEDURE — 20610 DRAIN/INJ JOINT/BURSA W/O US: CPT | Mod: LT

## 2020-12-14 NOTE — PROCEDURE
[de-identified] : Consent: \par At this time, I have recommended an injection to the left knee.  The risks and benefits of the procedure were discussed with the patient in detail.  Upon verbal consent of the patient, we proceeded with the injection as noted below.  \par \par Procedure:  \par After a sterile prep, the patient underwent an injection of 9 cc of 1% Lidocaine without epinephrine and 1 cc of Kenalog into the left knee.  The patient tolerated the procedure well.  There were no complications.

## 2020-12-14 NOTE — HISTORY OF PRESENT ILLNESS
[de-identified] : Viktoriya comes in today status post an intra-articular injection to her left hip and is feeling great.  She is having a lot of complaints of pain to her left knee.

## 2020-12-14 NOTE — DISCUSSION/SUMMARY
[de-identified] : The patient is doing well status post left hip osteoarthritis.  At this time, I have recommended ice and elevation for the left knee osteoarthritis.  She will be reassessed in three to four weeks.

## 2020-12-14 NOTE — ADDENDUM
[FreeTextEntry1] : This note was written by Katherine Vargas on 12/10/2020 acting as scribe for Jm Mitchell III, MD

## 2020-12-14 NOTE — PHYSICAL EXAM
[de-identified] : Left Knee: \par Range of Motion in Degrees	\par 	                  Claimant:	Normal:	\par Flexion Active	  135 	                135-degrees	\par Flexion Passive	  135	                135-degrees	\par Extension Active	  0-5	                0-5-degrees	\par Extension Passive	  0-5	                0-5-degrees	\par \par No weakness to flexion/extension. No evidence of instability in the AP plane or varus or valgus stress.  Negative  Lachman.  Negative pivot shift.  Negative anterior drawer test.  Negative posterior drawer test.  Negative Irina.  Negative Apley grind.  No medial or lateral joint line tenderness.  Positive tenderness over the medial and lateral facet of the patella.  Positive patellofemoral crepitations.  No lateral tilting patella.  No patella apprehension.  Positive crepitation in the medial and lateral femoral condyle.  No proximal or distal swelling, edema or tenderness.  No gross motor or sensory deficits. Mild intra-articular swelling.  2+ DP and PT pulses. No varus or valgus malalignment.  Skin is intact.  No rashes, scars or lesions. \par \par Left Hip: \par Range of Motion in Degrees:\par 	                                  Claimant:	Normal:	\par Flexion (Active) 	                  120 	                120-degrees	\par Flexion (Passive)	                  120	                120-degrees	\par Extension (Active)	                  -30	                -30-degrees	\par Extension (Passive)	  -30	                -30-degrees	\par Abduction (Active)	                  45-50	                88-87-qhxcmmy	\par Abduction (Passive)	  45-50	                72-98-ivcgizh	\par Adduction (Active)	                  20-30	                54-83-vetvuex	\par Adduction (Passive)	  20-30	                30-32-xvrppfe	\par Internal Rotation (Active) 	 35	                35-degrees	\par Internal Rotation (Passive)	 35	                35-degrees	\par External Rotation (Active)	 45	                45-degrees	\par External Rotation (Passive)	 45	                45-degrees	\par \par Tenderness into the groin with internal and external rotation and axial load.  No tenderness to palpation over the greater trochanter.  Negative Trendelenburg.  No tenderness with resisted abduction.  No weakness to flexion, extension, abduction or adduction.  No evidence of instability.  No motor or sensory deficits.  2+ DP and PT pulses.  Skin is intact.  No scars, rashes or lesions.  \par   [de-identified] : Ambulating with a slightly antalgic to antalgic gait.  Station:  Normal.  [de-identified] : General Appearance:  Well-developed, well-nourished female in no acute distress.

## 2021-01-06 ENCOUNTER — APPOINTMENT (OUTPATIENT)
Dept: ORTHOPEDIC SURGERY | Facility: CLINIC | Age: 66
End: 2021-01-06
Payer: MEDICARE

## 2021-01-06 VITALS
HEIGHT: 63 IN | SYSTOLIC BLOOD PRESSURE: 112 MMHG | DIASTOLIC BLOOD PRESSURE: 74 MMHG | HEART RATE: 59 BPM | BODY MASS INDEX: 39.87 KG/M2 | WEIGHT: 225 LBS

## 2021-01-06 PROCEDURE — 72170 X-RAY EXAM OF PELVIS: CPT

## 2021-01-06 PROCEDURE — 99213 OFFICE O/P EST LOW 20 MIN: CPT

## 2021-01-07 NOTE — DISCUSSION/SUMMARY
[de-identified] : The patient presents with osteoarthritis, right hip.  At this time I recommend she undergo an intraarticular injection.  She is being referred for such.

## 2021-01-07 NOTE — ADDENDUM
[FreeTextEntry1] : This note was written by Criss Hines on 01/07/2021 acting as scribe for Jm Mitchell III, MD

## 2021-01-07 NOTE — PHYSICAL EXAM
[de-identified] : Right hip:\par Hip: Range of Motion in Degrees:\par 	                                  Claimant:	Normal:	\par Flexion (Active) 	                  120 	                120-degrees	\par Flexion (Passive)	                  120	                120-degrees	\par Extension (Active)	                  -30	                -30-degrees	\par Extension (Passive)	  -30	                -30-degrees	\par Abduction (Active)	                  45-50	                27-17-xqxdouh	\par Abduction (Passive)	  45-50	                98-33-zbklhcv	\par Adduction (Active)	                  20-30	                99-04-jfgljua	\par Adduction (Passive)	  20-30	                63-49-hfwojfk	\par Internal Rotation (Active) 	 35	                35-degrees	\par Internal Rotation (Passive)	 35	                35-degrees	\par External Rotation (Active)	 45	                45-degrees	\par External Rotation (Passive)	 45	                45-degrees	\par \par Tenderness into the groin with internal and external rotation and axial load.  No tenderness to palpation over the greater trochanter.  Negative Trendelenburg.  No tenderness with resisted abduction.  No weakness to flexion, extension, abduction or adduction.  No evidence of instability.  No motor or sensory deficits.  2+ DP and PT pulses.  Skin is intact.  No scars, rashes or lesions.  \par   [de-identified] : Gait: Slightly antalgic to antalgic gait.  Station: Normal  [de-identified] : Appearance: Well-developed, well-nourished female  in no acute distress.  [de-identified] : Radiographs, one view of the pelvis, shows early severe arthritic change.

## 2021-03-04 ENCOUNTER — NON-APPOINTMENT (OUTPATIENT)
Age: 66
End: 2021-03-04

## 2021-03-04 DIAGNOSIS — Z20.822 ENCOUNTER FOR PREPROCEDURAL LABORATORY EXAMINATION: ICD-10-CM

## 2021-03-04 DIAGNOSIS — Z01.812 ENCOUNTER FOR PREPROCEDURAL LABORATORY EXAMINATION: ICD-10-CM

## 2021-03-25 ENCOUNTER — APPOINTMENT (OUTPATIENT)
Dept: ORTHOPEDIC SURGERY | Facility: CLINIC | Age: 66
End: 2021-03-25
Payer: MEDICARE

## 2021-03-25 VITALS
BODY MASS INDEX: 39.87 KG/M2 | HEIGHT: 63 IN | HEART RATE: 62 BPM | DIASTOLIC BLOOD PRESSURE: 84 MMHG | WEIGHT: 225 LBS | TEMPERATURE: 98 F | SYSTOLIC BLOOD PRESSURE: 124 MMHG

## 2021-03-25 PROCEDURE — 99213 OFFICE O/P EST LOW 20 MIN: CPT

## 2021-03-29 LAB — SARS-COV-2 N GENE NPH QL NAA+PROBE: NOT DETECTED

## 2021-04-01 ENCOUNTER — APPOINTMENT (OUTPATIENT)
Dept: INTERNAL MEDICINE | Facility: CLINIC | Age: 66
End: 2021-04-01
Payer: MEDICARE

## 2021-04-01 VITALS
BODY MASS INDEX: 41.75 KG/M2 | WEIGHT: 224 LBS | DIASTOLIC BLOOD PRESSURE: 84 MMHG | HEART RATE: 73 BPM | SYSTOLIC BLOOD PRESSURE: 124 MMHG | TEMPERATURE: 98.2 F | OXYGEN SATURATION: 97 % | RESPIRATION RATE: 18 BRPM | HEIGHT: 61.5 IN

## 2021-04-01 DIAGNOSIS — Z01.419 ENCOUNTER FOR GYNECOLOGICAL EXAMINATION (GENERAL) (ROUTINE) W/OUT ABNORMAL FINDINGS: ICD-10-CM

## 2021-04-01 DIAGNOSIS — Z09 ENCOUNTER FOR FOLLOW-UP EXAMINATION AFTER COMPLETED TREATMENT FOR CONDITIONS OTHER THAN MALIGNANT NEOPLASM: ICD-10-CM

## 2021-04-01 DIAGNOSIS — Z87.42 PERSONAL HISTORY OF OTHER DISEASES OF THE FEMALE GENITAL TRACT: ICD-10-CM

## 2021-04-01 DIAGNOSIS — N34.3 URETHRAL SYNDROME, UNSPECIFIED: ICD-10-CM

## 2021-04-01 DIAGNOSIS — R93.89 ABNORMAL FINDINGS ON DIAGNOSTIC IMAGING OF OTHER SPECIFIED BODY STRUCTURES: ICD-10-CM

## 2021-04-01 DIAGNOSIS — Z87.19 PERSONAL HISTORY OF OTHER DISEASES OF THE DIGESTIVE SYSTEM: ICD-10-CM

## 2021-04-01 PROCEDURE — 94729 DIFFUSING CAPACITY: CPT

## 2021-04-01 PROCEDURE — 94727 GAS DIL/WSHOT DETER LNG VOL: CPT

## 2021-04-01 PROCEDURE — 94060 EVALUATION OF WHEEZING: CPT

## 2021-04-01 PROCEDURE — ZZZZZ: CPT

## 2021-04-01 PROCEDURE — 99214 OFFICE O/P EST MOD 30 MIN: CPT | Mod: 25

## 2021-04-01 RX ORDER — OXYBUTYNIN CHLORIDE 5 MG/1
5 TABLET ORAL
Qty: 180 | Refills: 2 | Status: DISCONTINUED | COMMUNITY
Start: 2020-06-12 | End: 2021-04-01

## 2021-04-01 RX ORDER — PHENAZOPYRIDINE 200 MG/1
200 TABLET, FILM COATED ORAL 3 TIMES DAILY
Qty: 21 | Refills: 2 | Status: DISCONTINUED | COMMUNITY
Start: 2020-06-29 | End: 2021-04-01

## 2021-04-01 RX ORDER — MIRABEGRON 25 MG/1
25 TABLET, FILM COATED, EXTENDED RELEASE ORAL
Qty: 7 | Refills: 0 | Status: DISCONTINUED | OUTPATIENT
Start: 2020-03-12 | End: 2021-04-01

## 2021-04-01 RX ORDER — MIRABEGRON 50 MG/1
50 TABLET, FILM COATED, EXTENDED RELEASE ORAL
Qty: 90 | Refills: 3 | Status: DISCONTINUED | COMMUNITY
Start: 2020-06-04 | End: 2021-04-01

## 2021-04-01 NOTE — PROCEDURE
[FreeTextEntry1] : Complete pulmonary function tests show normal spirometry, lung volumes and flow-volume loop. Diffusing capacity is 108% predicted.

## 2021-04-01 NOTE — HISTORY OF PRESENT ILLNESS
[TextBox_4] : Ms. Salazar presents for followup evaluation. She recently had a cardiology evaluation by Dr. Leone which included an echocardiogram. Echocardiogram showed mild pulmonary hypertension. Patient does have a history of obstructive sleep apnea. She states that her son has told her that she has episodes at night where she stops breathing and she snores loudly. Ms. Salazar does complain of increased fatigue during the day with associated difficulty with cognition.

## 2021-04-01 NOTE — DISCUSSION/SUMMARY
[FreeTextEntry1] : Ms. Salazar presents for a followup evaluation. She has a history of obstructive sleep apnea. Recent echocardiogram demonstrated mild pulmonary hypertension. Complete pulmonary function test showed no evidence of restrictive or obstructive lung disease. Diffusing capacity is normal. Patient most likely has mild pulmonary hypertension as a result of untreated obstructive sleep apnea. She has been diagnosed in the past with sleep apnea but is currently not on CPAP. Ms. Salazar will be scheduled for an in lab polysomnography examination. She will most likely require a CPAP titration.

## 2021-04-06 NOTE — DISCUSSION/SUMMARY
[de-identified] : At this time, due to trochanteric bursitis and osteoarthritis of the right hip, I recommended she start a course of therapy.  She will be reassessed in six weeks.  We discussed the potential for injection, but she will have to get cleared because of her diabetes and she has to be beyond her vaccinations.

## 2021-04-06 NOTE — ADDENDUM
[FreeTextEntry1] : This note was written by Zenaida Tomas on 03/30/2021 acting as a scribe for TE NUNEZ III, MD

## 2021-04-06 NOTE — PHYSICAL EXAM
[de-identified] : Right Hip: \par Range of Motion in Degrees:\par 	                                 Claimant:	          Normal:	\par Flexion (Active) 	                 120 	          120-degrees	\par Flexion (Passive)	                 120	          120-degrees	\par Extension (Active)	                 -30	          -30-degrees	\par Extension (Passive)	 -30	          -30-degrees	\par Abduction (Active)	                45-50	          56-22-izwtbau	\par Abduction (Passive)	45-50	          07-57-tlsiqki	\par Adduction (Active)                	20-30	          88-84-bkepdvv	\par Adduction (Passive)	20-30	          32-69-nubfkyh	\par Internal Rotation (Active) 	35	          35-degrees	\par Internal Rotation (Passive)	35	          35-degrees	\par External Rotation (Active)	45	          45-degrees	\par External Rotation (Passive)	45	          45-degrees	\par \par Tenderness with internal or external rotation or axial load.  Point tenderness over the greater trochanter with pain with resisted abduction.  Negative Trendelenburg.  No weakness to flexion, extension, abduction or adduction.  No evidence of instability.  No motor or sensory deficits.  2+ DP and PT pulses.  Skin is intact.  No scars, rashes or lesions.  \par   [de-identified] : Ambulating with a slightly antalgic to antalgic gait.  Station:  Normal.  [de-identified] : Appearance:  Well-developed, well-nourished female in no acute distress.\par

## 2021-04-06 NOTE — HISTORY OF PRESENT ILLNESS
[de-identified] : The patient comes in today with complaints of pain to her right hip.  She points more to her lateral aspect as the source of her pain.

## 2021-04-15 ENCOUNTER — APPOINTMENT (OUTPATIENT)
Dept: ORTHOPEDIC SURGERY | Facility: CLINIC | Age: 66
End: 2021-04-15
Payer: MEDICARE

## 2021-04-15 VITALS
BODY MASS INDEX: 41.04 KG/M2 | SYSTOLIC BLOOD PRESSURE: 135 MMHG | DIASTOLIC BLOOD PRESSURE: 80 MMHG | HEART RATE: 62 BPM | HEIGHT: 62 IN | WEIGHT: 223 LBS | TEMPERATURE: 97.8 F

## 2021-04-15 DIAGNOSIS — M16.11 UNILATERAL PRIMARY OSTEOARTHRITIS, RIGHT HIP: ICD-10-CM

## 2021-04-15 DIAGNOSIS — M70.61 TROCHANTERIC BURSITIS, RIGHT HIP: ICD-10-CM

## 2021-04-15 DIAGNOSIS — M17.11 UNILATERAL PRIMARY OSTEOARTHRITIS, RIGHT KNEE: ICD-10-CM

## 2021-04-15 PROCEDURE — 99213 OFFICE O/P EST LOW 20 MIN: CPT

## 2021-04-16 ENCOUNTER — OUTPATIENT (OUTPATIENT)
Dept: OUTPATIENT SERVICES | Facility: HOSPITAL | Age: 66
LOS: 1 days | End: 2021-04-16
Payer: MEDICARE

## 2021-04-16 DIAGNOSIS — Z90.49 ACQUIRED ABSENCE OF OTHER SPECIFIED PARTS OF DIGESTIVE TRACT: Chronic | ICD-10-CM

## 2021-04-16 DIAGNOSIS — G47.33 OBSTRUCTIVE SLEEP APNEA (ADULT) (PEDIATRIC): ICD-10-CM

## 2021-04-16 PROCEDURE — 95810 POLYSOM 6/> YRS 4/> PARAM: CPT

## 2021-04-16 PROCEDURE — 95810 POLYSOM 6/> YRS 4/> PARAM: CPT | Mod: 26

## 2021-04-22 DIAGNOSIS — Z96.651 PRESENCE OF RIGHT ARTIFICIAL KNEE JOINT: ICD-10-CM

## 2021-04-24 ENCOUNTER — APPOINTMENT (OUTPATIENT)
Dept: CT IMAGING | Facility: CLINIC | Age: 66
End: 2021-04-24
Payer: MEDICARE

## 2021-04-24 ENCOUNTER — OUTPATIENT (OUTPATIENT)
Dept: OUTPATIENT SERVICES | Facility: HOSPITAL | Age: 66
LOS: 1 days | End: 2021-04-24
Payer: MEDICARE

## 2021-04-24 DIAGNOSIS — Z90.49 ACQUIRED ABSENCE OF OTHER SPECIFIED PARTS OF DIGESTIVE TRACT: Chronic | ICD-10-CM

## 2021-04-24 DIAGNOSIS — I27.20 PULMONARY HYPERTENSION, UNSPECIFIED: ICD-10-CM

## 2021-04-24 PROCEDURE — 71250 CT THORAX DX C-: CPT

## 2021-04-24 PROCEDURE — 71250 CT THORAX DX C-: CPT | Mod: 26,ME

## 2021-04-24 PROCEDURE — G1004: CPT

## 2021-04-27 ENCOUNTER — NON-APPOINTMENT (OUTPATIENT)
Age: 66
End: 2021-04-27

## 2021-04-28 ENCOUNTER — NON-APPOINTMENT (OUTPATIENT)
Age: 66
End: 2021-04-28

## 2021-05-06 NOTE — PHYSICAL EXAM
[de-identified] : Right hip:\par Hip: Range of Motion in Degrees:\par 	                                  Claimant:	Normal:	\par Flexion (Active) 	                  120 	                120-degrees	\par Flexion (Passive)	                  120	                120-degrees	\par Extension (Active)	                  -30	                -30-degrees	\par Extension (Passive)	  -30	                -30-degrees	\par Abduction (Active)	                  45-50	                07-27-qfwthlw	\par Abduction (Passive)	  45-50	                35-44-xefeibr	\par Adduction (Active)	                  20-30	                01-00-xxvqmpw	\par Adduction (Passive)	  20-30	                24-81-rsnhkmh	\par Internal Rotation (Active) 	 35	                35-degrees	\par Internal Rotation (Passive)	 35	                35-degrees	\par External Rotation (Active)	 45	                45-degrees	\par External Rotation (Passive)	 45	                45-degrees	\par \par Tenderness into the groin with internal and external rotation and axial load.  Point tenderness over the greater trochanter with pain with resisted abduction.  Negative Trendelenburg.  No tenderness with resisted abduction.  No weakness to flexion, extension, abduction or adduction.  No evidence of instability.  No motor or sensory deficits.  2+ DP and PT pulses.  Skin is intact.  No scars, rashes or lesions.  \par  \par Right knee:\par Range of Motion in Degrees	\par 	  Claimant:	Normal:	\par Flexion Active	 135 	 135-degrees	\par Flexion Passive	 135	 135-degrees	\par Extension Active	 0-5	 0-5-degrees	\par Extension Passive	 0-5	 0-5-degrees	\par \par Well healed scar. Minimal effusion. Mildly tender over the proximal pole of the patella. \par \par  [de-identified] : Ambulating with a slightly antalgic to antalgic gait.  Station:  Normal.  [de-identified] : Appearance:  Well-developed, well-nourished female in no acute distress.\par

## 2021-05-06 NOTE — HISTORY OF PRESENT ILLNESS
[de-identified] : Viktoriya comes in today with persistent complaints of pain to her right knee and hip.

## 2021-05-06 NOTE — DISCUSSION/SUMMARY
[de-identified] : The patient presents 1.) status post right total knee and 2.)osteoarthritis with trochanteric bursitis right hip.  At this time I recommend she continue her therapy.  She will be reassessed in six weeks.   (0) swallows foods and liquids w/o difficulty

## 2021-05-18 ENCOUNTER — APPOINTMENT (OUTPATIENT)
Dept: INTERNAL MEDICINE | Facility: CLINIC | Age: 66
End: 2021-05-18
Payer: MEDICARE

## 2021-05-18 VITALS
OXYGEN SATURATION: 97 % | DIASTOLIC BLOOD PRESSURE: 90 MMHG | HEART RATE: 80 BPM | WEIGHT: 225 LBS | HEIGHT: 62 IN | BODY MASS INDEX: 41.41 KG/M2 | TEMPERATURE: 98.4 F | SYSTOLIC BLOOD PRESSURE: 120 MMHG | RESPIRATION RATE: 16 BRPM

## 2021-05-18 DIAGNOSIS — Z86.69 PERSONAL HISTORY OF OTHER DISEASES OF THE NERVOUS SYSTEM AND SENSE ORGANS: ICD-10-CM

## 2021-05-18 PROCEDURE — 99214 OFFICE O/P EST MOD 30 MIN: CPT

## 2021-05-18 RX ORDER — TRAZODONE HYDROCHLORIDE 300 MG/1
TABLET ORAL
Refills: 0 | Status: ACTIVE | COMMUNITY

## 2021-05-18 RX ORDER — AMOXICILLIN 500 MG/1
500 TABLET, FILM COATED ORAL
Qty: 24 | Refills: 0 | Status: DISCONTINUED | COMMUNITY
Start: 2020-03-03 | End: 2021-05-18

## 2021-05-18 RX ORDER — CYCLOBENZAPRINE HYDROCHLORIDE 10 MG/1
10 TABLET, FILM COATED ORAL 3 TIMES DAILY
Qty: 63 | Refills: 1 | Status: DISCONTINUED | COMMUNITY
Start: 2020-11-27 | End: 2021-05-18

## 2021-05-18 RX ORDER — METHYLPREDNISOLONE 4 MG/1
4 TABLET ORAL
Qty: 2 | Refills: 0 | Status: DISCONTINUED | COMMUNITY
Start: 2020-11-19 | End: 2021-05-18

## 2021-05-18 RX ORDER — NITROFURANTOIN MACROCRYSTALS 100 MG/1
100 CAPSULE ORAL
Qty: 14 | Refills: 1 | Status: DISCONTINUED | COMMUNITY
Start: 2020-12-02 | End: 2021-05-18

## 2021-05-18 RX ORDER — FESOTERODINE FUMARATE 8 MG/1
8 TABLET, FILM COATED, EXTENDED RELEASE ORAL
Qty: 90 | Refills: 0 | Status: DISCONTINUED | COMMUNITY
Start: 2020-03-12 | End: 2021-05-18

## 2021-05-18 RX ORDER — MELOXICAM 7.5 MG/1
7.5 TABLET ORAL TWICE DAILY
Qty: 60 | Refills: 0 | Status: DISCONTINUED | COMMUNITY
Start: 2020-11-12 | End: 2021-05-18

## 2021-05-18 RX ORDER — PHENAZOPYRIDINE HYDROCHLORIDE 200 MG/1
200 TABLET ORAL 3 TIMES DAILY
Qty: 9 | Refills: 0 | Status: DISCONTINUED | COMMUNITY
Start: 2020-12-02 | End: 2021-05-18

## 2021-05-18 RX ORDER — NITROFURANTOIN MACROCRYSTALS 100 MG/1
100 CAPSULE ORAL
Qty: 14 | Refills: 0 | Status: DISCONTINUED | COMMUNITY
Start: 2020-05-27 | End: 2021-05-18

## 2021-05-18 RX ORDER — PHENAZOPYRIDINE HYDROCHLORIDE 200 MG/1
200 TABLET ORAL 3 TIMES DAILY
Qty: 9 | Refills: 0 | Status: DISCONTINUED | COMMUNITY
Start: 2020-05-27 | End: 2021-05-18

## 2021-05-18 RX ORDER — ESTRADIOL 0.1 MG/G
0.1 CREAM VAGINAL
Qty: 1 | Refills: 3 | Status: DISCONTINUED | COMMUNITY
Start: 2020-11-09 | End: 2021-05-18

## 2021-05-18 NOTE — HISTORY OF PRESENT ILLNESS
[TextBox_4] : Ms. Salazar presents for a followup evaluation. She did have an in lab polysomnography examination which did not demonstrate obstructive sleep apnea. The apnea/hypopnea index was 3.2 events per hour. The patient did have nocturnal hypoxemia. She spent 49.8% and it was an option saturation below 90%. The lowest O2 saturation was 83%. She continues to complain of some tiredness during the day.

## 2021-05-18 NOTE — DISCUSSION/SUMMARY
[FreeTextEntry1] : Ms. Salazar presents for followup evaluation. 5 some IV examination did not demonstrate significant obstructive sleep apnea. She does have some mild nocturnal hypoxemia. Patient will be skilled for an overnight oximetry study. She has a previous history gastric bypass surgery and will be having an evaluation for possible revision. Followup in this office in 6 months.

## 2021-05-20 ENCOUNTER — APPOINTMENT (OUTPATIENT)
Dept: RADIOLOGY | Facility: CLINIC | Age: 66
End: 2021-05-20
Payer: MEDICARE

## 2021-05-20 ENCOUNTER — OUTPATIENT (OUTPATIENT)
Dept: OUTPATIENT SERVICES | Facility: HOSPITAL | Age: 66
LOS: 1 days | End: 2021-05-20
Payer: MEDICARE

## 2021-05-20 ENCOUNTER — APPOINTMENT (OUTPATIENT)
Dept: BARIATRICS | Facility: CLINIC | Age: 66
End: 2021-05-20
Payer: MEDICARE

## 2021-05-20 VITALS
OXYGEN SATURATION: 95 % | TEMPERATURE: 96.8 F | HEART RATE: 67 BPM | WEIGHT: 226 LBS | HEIGHT: 62 IN | SYSTOLIC BLOOD PRESSURE: 120 MMHG | DIASTOLIC BLOOD PRESSURE: 80 MMHG | BODY MASS INDEX: 41.59 KG/M2

## 2021-05-20 DIAGNOSIS — M85.80 OTHER SPECIFIED DISORDERS OF BONE DENSITY AND STRUCTURE, UNSPECIFIED SITE: ICD-10-CM

## 2021-05-20 DIAGNOSIS — Z90.49 ACQUIRED ABSENCE OF OTHER SPECIFIED PARTS OF DIGESTIVE TRACT: Chronic | ICD-10-CM

## 2021-05-20 DIAGNOSIS — E66.01 MORBID (SEVERE) OBESITY DUE TO EXCESS CALORIES: ICD-10-CM

## 2021-05-20 PROCEDURE — 77085 DXA BONE DENSITY AXL VRT FX: CPT | Mod: 26

## 2021-05-20 PROCEDURE — 77085 DXA BONE DENSITY AXL VRT FX: CPT

## 2021-05-20 PROCEDURE — 99204 OFFICE O/P NEW MOD 45 MIN: CPT

## 2021-05-20 RX ORDER — PANTOPRAZOLE 40 MG/1
TABLET, DELAYED RELEASE ORAL
Refills: 0 | Status: ACTIVE | COMMUNITY

## 2021-05-20 RX ORDER — CYCLOBENZAPRINE HYDROCHLORIDE 10 MG/1
10 TABLET, FILM COATED ORAL
Refills: 0 | Status: DISCONTINUED | COMMUNITY

## 2021-05-20 RX ORDER — CALCIUM CITRATE 150 MG
CAPSULE ORAL
Refills: 0 | Status: ACTIVE | COMMUNITY

## 2021-05-20 RX ORDER — MULTIVITAMIN
TABLET ORAL
Refills: 0 | Status: ACTIVE | COMMUNITY

## 2021-05-21 ENCOUNTER — APPOINTMENT (OUTPATIENT)
Dept: ORTHOPEDIC SURGERY | Facility: CLINIC | Age: 66
End: 2021-05-21
Payer: MEDICARE

## 2021-05-21 DIAGNOSIS — M67.88 OTHER SPECIFIED DISORDERS OF SYNOVIUM AND TENDON, OTHER SITE: ICD-10-CM

## 2021-05-21 PROCEDURE — 99212 OFFICE O/P EST SF 10 MIN: CPT

## 2021-05-21 NOTE — PHYSICAL EXAM
[de-identified] : Left ankle Physical Examination:\par \par General: Alert and oriented x3.  In no acute distress.  Pleasant in nature with a normal affect.  No apparent respiratory distress. \par Erythema, Warmth, Rubor: Negative\par Swelling: Negative\par \par ROM:\par 1. Dorsiflexion: 10 degrees\par 2. Plantarflexion: 40 degrees\par 3. Inversion: 10 degrees\par 4. Eversion: 10 degrees\par \par Tenderness to Palpation: \par 1. Lateral Malleolus: Negative\par 2. Medial Malleolus: Negative\par 3. Proximal Fibular Pain: Negative\par 4. Heel Pain: Negative\par 5. Cuboid: Negative\par 6. Navicular: Negative\par 7. Tibiotalar Joint: Negative\par 8. Subtalar Joint: Negative\par 9. Posterior Recess: Negative\par \par Tendon Pain:\par 1. Achilles: Positive.  No defect noted.  Negative Allison squeeze test.\par 2. Peroneals: Negative\par 3. Posterior Tibialis: Negative\par 4. Tibialis Anterior: Negative\par \par Ligament Pain:\par 1. ATFL: Negative\par 2. CFL: Negative \par 3. PTFL: Negative\par 4. Deltoid Ligaments: Negative\par 5. Lis Franc Ligament: Negative\par \par Stability: \par 1. Anterior Drawer: Negative\par 2. Posterior Drawer: Negative\par \par Strength: 5/5 TA/GS/EHL\par \par Pulses: 2+ DP/PT Pulses\par \par Neuro: Intact motor and sensory\par \par Additional Test:\par 1. Calcaneal Squeeze Test: Negative\par 2. Syndesmosis Squeeze Test: Negative [de-identified] : No imaging performed today.

## 2021-05-21 NOTE — HISTORY OF PRESENT ILLNESS
[FreeTextEntry1] : The patient presents for a follow-up of her left ankle.  The patient continues to have pain at the Achilles tendon region.  She was diagnosed with Achilles tendinosis with partial tearing at the attachment from an MRI in October 2020.  Her pain scale today is a 5 out of 10 at the insertion of the Achilles tendon left ankle.  The pain has been going on for a couple of months.  She had no recent trauma to the ankle.  No numbness or tingling going down the foot.

## 2021-05-21 NOTE — DISCUSSION/SUMMARY
[de-identified] : Assessment: Left ankle Achilles tendinosis, insertional.\par \par Plan:\par 1. Physical Therapy.\par 2. NSAIDs/Tylenol as needed for pain. \par 3. Return to normal activities as tolerated. \par 4. Continue with ice and heat therapy. \par 5. All questions answered.  Follow-up as needed. If pain persists consider advanced imaging in the future.  The patient understood the treatment plan.

## 2021-05-26 NOTE — REVIEW OF SYSTEMS
[Leg Claudication] : intermittent leg claudication [Reflux/Heartburn] : reflux/heartburn [Hernia] : hernia [Joint Pain] : joint pain [Anxiety] : anxiety [Negative] : Integumentary [Fever] : no fever [Chills] : no chills [Night Sweats] : no night sweats [Recent Change In Weight] : ~T no recent weight change [Eye Pain] : no eye pain [Red Eyes] : eyes not red [Vision Problems] : no vision problems [Dysphagia] : no dysphagia [Hoarseness] : no hoarseness [Odynophagia] : no odynophagia [Chest Pain] : no chest pain [Palpitations] : no palpitations [Shortness Of Breath] : no shortness of breath [Wheezing] : no wheezing [Cough] : no cough [SOB on Exertion] : no shortness of breath during exertion [Abdominal Pain] : no abdominal pain [Vomiting] : no vomiting [Constipation] : no constipation [Diarrhea] : no diarrhea [Confused] : no confusion [Dizziness] : no dizziness [Fainting] : no fainting [Suicidal] : not suicidal [Insomnia] : no insomnia

## 2021-05-26 NOTE — ASSESSMENT
[FreeTextEntry1] : 66 yo F s/p gastric bypass in 2003 and revision - stomaphyX in 2009 presents today to discuss weight loss options. Nutritionist briefly met with patient today. \par \par Should follow a low fat, low carbohydrate and protein focused diet, with three meals a day. \par Avoid snacking on sweets\par Increase water intake and all liquids with zero calories\par Continue PT\par Appointment with nutritionist\par Obtain lab results from PCP\par Additional labs needed\par Dexa Scan\par Follow up after nutritionist appointment\par Call with any questions or concerns\par

## 2021-05-26 NOTE — PHYSICAL EXAM
[Obese] : obese [Normal] : affect appropriate [de-identified] : EOMI, anicteric  [de-identified] : obese, soft, nontender, small supraumbilical hernia. well healed incisions

## 2021-05-26 NOTE — HISTORY OF PRESENT ILLNESS
[Procedure: ___] : Procedure performed: [unfilled]  [Date of Surgery: ___] : Date of Surgery:   [unfilled] [Pre-Op Weight ___] : Pre-op weight was [unfilled] lbs [de-identified] : 66 yo F s/p gastric bypass in 2003 and revision - stomaphyX in 2009 presents today to discuss weight loss options. Preop weight was 325 lb and lost 140 lb with 1.5 years of primary surgery. Life stressors resulted in weight regain to current weight - had stoma reduction with no subsequent change in weight, but resulted in acid reflux. Would like to get back down to 185 lbs. Currently has 1-2 meals a day - high carb, low protein and snacks at night on sweets. Will have small portion of meal, stop when full and then go back and pick on it. Regurgitation happens about once a week when eating too fast and not chewing well enough.  Drinks 20-30 oz of low calorie Gatorade 20-30 oz, 20 oz of coffee with whole milk and couple packets of Equal daily. For acid reflux symptoms, was taking Pantoprazole 40 mg twice a day and Famotidine at night. Recently stopped pantoprazole at night with no change in symptoms. Last Dexa scan three years ago and has history of osteopenia. Takes vitamins daily. Reports not sleeping well. When don’t need to get up early, goes to sleep at 3 AM - gets up multiple times during the night. Recent sleep study - no ZHANNA, but mild nocturnal hypoxemia - might be started on O2 at night. Limited exercise secondary to joint pain - needs right hip replacement. Goes to PT 2-3 times a week. Recently had labs drawn with PCP.  [de-identified] : Gastric bypass revision - stomaphyX 2009 preop weight approx 220

## 2021-06-14 LAB
25(OH)D3 SERPL-MCNC: 94 NG/ML
A-TOCOPHEROL VIT E SERPL-MCNC: 10.9 MG/L
ALBUMIN SERPL ELPH-MCNC: 4.5 G/DL
ALP BLD-CCNC: 96 U/L
ALT SERPL-CCNC: 19 U/L
ANION GAP SERPL CALC-SCNC: 13 MMOL/L
AST SERPL-CCNC: 20 U/L
BASOPHILS # BLD AUTO: 0.03 K/UL
BASOPHILS NFR BLD AUTO: 0.6 %
BETA+GAMMA TOCOPHEROL SERPL-MCNC: 0.7 MG/L
BILIRUB SERPL-MCNC: 0.3 MG/DL
BUN SERPL-MCNC: 21 MG/DL
CALCIUM SERPL-MCNC: 9.7 MG/DL
CHLORIDE SERPL-SCNC: 103 MMOL/L
CO2 SERPL-SCNC: 26 MMOL/L
CREAT SERPL-MCNC: 0.91 MG/DL
EOSINOPHIL # BLD AUTO: 0.14 K/UL
EOSINOPHIL NFR BLD AUTO: 2.7 %
FERRITIN SERPL-MCNC: 83 NG/ML
FOLATE SERPL-MCNC: >20 NG/ML
GLUCOSE SERPL-MCNC: 123 MG/DL
HCT VFR BLD CALC: 38.8 %
HGB BLD-MCNC: 12.7 G/DL
IMM GRANULOCYTES NFR BLD AUTO: 0.4 %
IRON SATN MFR SERPL: 33 %
IRON SERPL-MCNC: 100 UG/DL
LYMPHOCYTES # BLD AUTO: 0.77 K/UL
LYMPHOCYTES NFR BLD AUTO: 15 %
MAN DIFF?: NORMAL
MCHC RBC-ENTMCNC: 31.1 PG
MCHC RBC-ENTMCNC: 32.7 GM/DL
MCV RBC AUTO: 94.9 FL
MENADIONE SERPL-MCNC: 0.36 NG/ML
MONOCYTES # BLD AUTO: 0.43 K/UL
MONOCYTES NFR BLD AUTO: 8.4 %
NEUTROPHILS # BLD AUTO: 3.74 K/UL
NEUTROPHILS NFR BLD AUTO: 72.9 %
PLATELET # BLD AUTO: 630 K/UL
POTASSIUM SERPL-SCNC: 4.5 MMOL/L
PROT SERPL-MCNC: 7.1 G/DL
RBC # BLD: 4.09 M/UL
RBC # FLD: 13.5 %
SODIUM SERPL-SCNC: 142 MMOL/L
TIBC SERPL-MCNC: 300 UG/DL
UIBC SERPL-MCNC: 201 UG/DL
VIT A SERPL-MCNC: 73.3 UG/DL
VIT B1 SERPL-MCNC: 167.2 NMOL/L
VIT B12 SERPL-MCNC: 462 PG/ML
VIT B2 SERPL-MCNC: 329 UG/L
VIT B6 SERPL-MCNC: 5.4 UG/L
WBC # FLD AUTO: 5.13 K/UL

## 2021-08-04 ENCOUNTER — OUTPATIENT (OUTPATIENT)
Dept: OUTPATIENT SERVICES | Facility: HOSPITAL | Age: 66
LOS: 1 days | End: 2021-08-04
Payer: MEDICARE

## 2021-08-04 VITALS
HEIGHT: 63 IN | DIASTOLIC BLOOD PRESSURE: 68 MMHG | OXYGEN SATURATION: 98 % | SYSTOLIC BLOOD PRESSURE: 100 MMHG | TEMPERATURE: 98 F | RESPIRATION RATE: 18 BRPM | HEART RATE: 68 BPM | WEIGHT: 222.01 LBS

## 2021-08-04 DIAGNOSIS — M16.11 UNILATERAL PRIMARY OSTEOARTHRITIS, RIGHT HIP: ICD-10-CM

## 2021-08-04 DIAGNOSIS — Z90.49 ACQUIRED ABSENCE OF OTHER SPECIFIED PARTS OF DIGESTIVE TRACT: Chronic | ICD-10-CM

## 2021-08-04 DIAGNOSIS — Z29.9 ENCOUNTER FOR PROPHYLACTIC MEASURES, UNSPECIFIED: ICD-10-CM

## 2021-08-04 DIAGNOSIS — Z01.818 ENCOUNTER FOR OTHER PREPROCEDURAL EXAMINATION: ICD-10-CM

## 2021-08-04 LAB
A1C WITH ESTIMATED AVERAGE GLUCOSE RESULT: 6.1 % — HIGH (ref 4–5.6)
ALBUMIN SERPL ELPH-MCNC: 3.7 G/DL — SIGNIFICANT CHANGE UP (ref 3.3–5)
ANION GAP SERPL CALC-SCNC: 5 MMOL/L — SIGNIFICANT CHANGE UP (ref 5–17)
APTT BLD: 35.5 SEC — SIGNIFICANT CHANGE UP (ref 27.5–35.5)
BASOPHILS # BLD AUTO: 0.03 K/UL — SIGNIFICANT CHANGE UP (ref 0–0.2)
BASOPHILS NFR BLD AUTO: 0.5 % — SIGNIFICANT CHANGE UP (ref 0–2)
BUN SERPL-MCNC: 27 MG/DL — HIGH (ref 7–23)
CALCIUM SERPL-MCNC: 9.1 MG/DL — SIGNIFICANT CHANGE UP (ref 8.5–10.1)
CHLORIDE SERPL-SCNC: 107 MMOL/L — SIGNIFICANT CHANGE UP (ref 96–108)
CO2 SERPL-SCNC: 29 MMOL/L — SIGNIFICANT CHANGE UP (ref 22–31)
CREAT SERPL-MCNC: 1.07 MG/DL — SIGNIFICANT CHANGE UP (ref 0.5–1.3)
EOSINOPHIL # BLD AUTO: 0.15 K/UL — SIGNIFICANT CHANGE UP (ref 0–0.5)
EOSINOPHIL NFR BLD AUTO: 2.5 % — SIGNIFICANT CHANGE UP (ref 0–6)
ESTIMATED AVERAGE GLUCOSE: 128 MG/DL — HIGH (ref 68–114)
GLUCOSE SERPL-MCNC: 134 MG/DL — HIGH (ref 70–99)
HCT VFR BLD CALC: 39.8 % — SIGNIFICANT CHANGE UP (ref 34.5–45)
HGB BLD-MCNC: 13.1 G/DL — SIGNIFICANT CHANGE UP (ref 11.5–15.5)
IMM GRANULOCYTES NFR BLD AUTO: 0.5 % — SIGNIFICANT CHANGE UP (ref 0–1.5)
INR BLD: 1.14 RATIO — SIGNIFICANT CHANGE UP (ref 0.88–1.16)
LYMPHOCYTES # BLD AUTO: 0.71 K/UL — LOW (ref 1–3.3)
LYMPHOCYTES # BLD AUTO: 11.8 % — LOW (ref 13–44)
MCHC RBC-ENTMCNC: 30.3 PG — SIGNIFICANT CHANGE UP (ref 27–34)
MCHC RBC-ENTMCNC: 32.9 GM/DL — SIGNIFICANT CHANGE UP (ref 32–36)
MCV RBC AUTO: 92.1 FL — SIGNIFICANT CHANGE UP (ref 80–100)
MONOCYTES # BLD AUTO: 0.5 K/UL — SIGNIFICANT CHANGE UP (ref 0–0.9)
MONOCYTES NFR BLD AUTO: 8.3 % — SIGNIFICANT CHANGE UP (ref 2–14)
MRSA PCR RESULT.: SIGNIFICANT CHANGE UP
NEUTROPHILS # BLD AUTO: 4.58 K/UL — SIGNIFICANT CHANGE UP (ref 1.8–7.4)
NEUTROPHILS NFR BLD AUTO: 76.4 % — SIGNIFICANT CHANGE UP (ref 43–77)
PLATELET # BLD AUTO: 534 K/UL — HIGH (ref 150–400)
POTASSIUM SERPL-MCNC: 3.5 MMOL/L — SIGNIFICANT CHANGE UP (ref 3.5–5.3)
POTASSIUM SERPL-SCNC: 3.5 MMOL/L — SIGNIFICANT CHANGE UP (ref 3.5–5.3)
PROTHROM AB SERPL-ACNC: 13.3 SEC — SIGNIFICANT CHANGE UP (ref 10.6–13.6)
RBC # BLD: 4.32 M/UL — SIGNIFICANT CHANGE UP (ref 3.8–5.2)
RBC # FLD: 14 % — SIGNIFICANT CHANGE UP (ref 10.3–14.5)
S AUREUS DNA NOSE QL NAA+PROBE: DETECTED
SODIUM SERPL-SCNC: 141 MMOL/L — SIGNIFICANT CHANGE UP (ref 135–145)
WBC # BLD: 6 K/UL — SIGNIFICANT CHANGE UP (ref 3.8–10.5)
WBC # FLD AUTO: 6 K/UL — SIGNIFICANT CHANGE UP (ref 3.8–10.5)

## 2021-08-04 PROCEDURE — 80048 BASIC METABOLIC PNL TOTAL CA: CPT

## 2021-08-04 PROCEDURE — 85730 THROMBOPLASTIN TIME PARTIAL: CPT

## 2021-08-04 PROCEDURE — 87640 STAPH A DNA AMP PROBE: CPT

## 2021-08-04 PROCEDURE — 36415 COLL VENOUS BLD VENIPUNCTURE: CPT

## 2021-08-04 PROCEDURE — 85610 PROTHROMBIN TIME: CPT

## 2021-08-04 PROCEDURE — 86850 RBC ANTIBODY SCREEN: CPT

## 2021-08-04 PROCEDURE — 93010 ELECTROCARDIOGRAM REPORT: CPT

## 2021-08-04 PROCEDURE — G0463: CPT | Mod: 25

## 2021-08-04 PROCEDURE — 86900 BLOOD TYPING SEROLOGIC ABO: CPT

## 2021-08-04 PROCEDURE — 82040 ASSAY OF SERUM ALBUMIN: CPT

## 2021-08-04 PROCEDURE — 86901 BLOOD TYPING SEROLOGIC RH(D): CPT

## 2021-08-04 PROCEDURE — 85025 COMPLETE CBC W/AUTO DIFF WBC: CPT

## 2021-08-04 PROCEDURE — 83036 HEMOGLOBIN GLYCOSYLATED A1C: CPT

## 2021-08-04 PROCEDURE — 87641 MR-STAPH DNA AMP PROBE: CPT

## 2021-08-04 PROCEDURE — 93005 ELECTROCARDIOGRAM TRACING: CPT

## 2021-08-04 NOTE — H&P PST ADULT - HISTORY OF PRESENT ILLNESS
66 year old woman presents to [facility] for preprocedure exam. Patient is for planned right THR with Dr Hull on 8/13. Patient complains of right hip pain with ambulation- better with rest. She takes ibuprofen occasionally for pain with some relief.

## 2021-08-04 NOTE — H&P PST ADULT - ASSESSMENT
66 year old woman presents to [facility] for preprocedure exam. Patient is for planned right THR with Dr Hull on .         Plan:  - PST instructions given ; NPO status instructions to be given by ASU .  - Pt instructed to take following meds with sip of water : metoprolol, sertaline and pantoprazole   - Pt instructed to take routine evening medications unless indicated .  -  Stop NSAIDS ( Aspirin Alev Motrin Mobic Diclofenac), herbal supplements , MVI , Vitamin fish oil 7 days prior to surgery  unless   directed by surgeon or cardiologist;   - as per patient report she was told by her surgeon that it's ok for her to continue taking aspirin up until the surgery day.   - Medical Optimization  with Dr Garcia and Dr Leone   - EZ wash instructions given & mupirocin instructions given. Instructed to use Mupirocin on ly if he gets a phone call from PST staff   - Labs EKG CXR as per surgeon request. Chest CT from 2021  -  Pt instructed to self quarantine .  - Covid Testing scheduled     CAPRINI SCORE [CLOT]    AGE RELATED RISK FACTORS                                                       MOBILITY RELATED FACTORS  [ ] Age 41-60 years                                            (1 Point)                  [ ] Bed rest                                                        (1 Point)  [x ] Age: 61-74 years                                           (2 Points)                 [ ] Plaster cast                                                   (2 Points)  [ ] Age= 75 years                                              (3 Points)                 [ ] Bed bound for more than 72 hours                 (2 Points)    DISEASE RELATED RISK FACTORS                                               GENDER SPECIFIC FACTORS  [ ] Edema in the lower extremities                       (1 Point)                  [ ] Pregnancy                                                     (1 Point)  [ ] Varicose veins                                               (1 Point)                  [ ] Post-partum < 6 weeks                                   (1 Point)             [x ] BMI > 25 Kg/m2                                            (1 Point)                  [ ] Hormonal therapy  or oral contraception          (1 Point)                 [ ] Sepsis (in the previous month)                        (1 Point)                  [ ] History of pregnancy complications                 (1 point)  [ ] Pneumonia or serious lung disease                                               [ ] Unexplained or recurrent                     (1 Point)           (in the previous month)                               (1 Point)  [ ] Abnormal pulmonary function test                     (1 Point)                 SURGERY RELATED RISK FACTORS  [ ] Acute myocardial infarction                              (1 Point)                 [ ]  Section                                             (1 Point)  [ ] Congestive heart failure (in the previous month)  (1 Point)               [ ] Minor surgery                                                  (1 Point)   [ ] Inflammatory bowel disease                             (1 Point)                 [ ] Arthroscopic surgery                                        (2 Points)  [ ] Central venous access                                      (2 Points)                [ ] General surgery lasting more than 45 minutes   (2 Points)       [ ] Stroke (in the previous month)                          (5 Points)              [ x] Elective arthroplasty                                         (5 Points)        [ ] malignancy                                                     (2points)                                                                                                                                                                              HEMATOLOGY RELATED FACTORS                                                 TRAUMA RELATED RISK FACTORS  [ ] Prior episodes of VTE                                     (3 Points)                [ ] Fracture of the hip, pelvis, or leg                       (5 Points)  [ ] Positive family history for VTE                         (3 Points)                 [ ] Acute spinal cord injury (in the previous month)  (5 Points)  [ ] Prothrombin 56022 A                                     (3 Points)                 [ ] Paralysis  (less than 1 month)                             (5 Points)  [ ] Factor V Leiden                                             (3 Points)                  [ ] Multiple Trauma within 1 month                        (5 Points)  [ ] Lupus anticoagulants                                     (3 Points)                                                           [ ] Anticardiolipin antibodies                               (3 Points)                                                       [ ] High homocysteine in the blood                      (3 Points)                                             [ ] Other congenital or acquired thrombophilia      (3 Points)                                                [ ] Heparin induced thrombocytopenia                  (3 Points)                                          Total Score [  7        ]    Caprini Score 0 - 2:  Low Risk, No VTE Prophylaxis required for most patients, encourage ambulation  Caprini Score 3 - 6:  At Risk, pharmacologic VTE prophylaxis is indicated for most patients (in the absence of a contraindication)  Caprini Score Greater than or = 7:  High Risk, pharmacologic VTE prophylaxis is indicated for most patients (in the absence of a contraindication)

## 2021-08-04 NOTE — H&P PST ADULT - BP NONINVASIVE SYSTOLIC (MM HG)
CCC SW approved patient stepping down to Maintenance.    CHW Outreach: 9/17/2020    CHW Plan: Outreach in 2 months to evaluate for graduation or new goals      
2018
100

## 2021-08-04 NOTE — H&P PST ADULT - NSICDXPASTSURGICALHX_GEN_ALL_CORE_FT
PAST SURGICAL HISTORY:  Acquired female bladder prolapse     Benign cyst of skin     H/O abdominoplasty     H/O arthroscopy of left knee     H/O arthroscopy of right knee     H/O bilateral breast reduction surgery     H/O dilation and curettage     Hx of gastric bypass revision of gastric bypass    S/P cholecystectomy

## 2021-08-04 NOTE — H&P PST ADULT - NSICDXPASTMEDICALHX_GEN_ALL_CORE_FT
PAST MEDICAL HISTORY:  Achilles tendinosis of left lower extremity     Anemia     Anxiety     Aortic aneurysm without rupture CT scan every year for monitoring has been stable for the past years    CAD (coronary artery disease)     Chronic pain of right ankle     Chronic UTI (urinary tract infection)     Congenital anomaly of aorta     Depression     Diverticulosis     Gallstones h/o- ccy    Gastric bypass status for obesity sept 2003    GERD (gastroesophageal reflux disease)     H/O total knee replacement, right     Hemorrhoids     Hernia     History of thrombocytosis     HLD (hyperlipidemia)     Hanny's syndrome     HTN (hypertension)     Hypothyroid     Insomnia     Mitral valve regurgitation     Morbid obesity     Osteoarthritis     Osteopenia     Peroneal tendinitis of left lower extremity     Polyp of corpus uteri     Sleep apnea awating results    Splenomegaly mild to moderate- followed by hematologist    Tear of lateral meniscus of right knee

## 2021-08-04 NOTE — H&P PST ADULT - NSANTHOSAYNRD_GEN_A_CORE
No. ZHANNA screening performed.  STOP BANG Legend: 0-2 = LOW Risk; 3-4 = INTERMEDIATE Risk; 5-8 = HIGH Risk

## 2021-08-04 NOTE — H&P PST ADULT - NSICDXFAMILYHX_GEN_ALL_CORE_FT
FAMILY HISTORY:  Mother  Still living? Unknown  Family history of osteoarthritis, Age at diagnosis: Age Unknown    Grandparent  Still living? Unknown  Family history of gout, Age at diagnosis: Age Unknown

## 2021-08-05 DIAGNOSIS — M16.11 UNILATERAL PRIMARY OSTEOARTHRITIS, RIGHT HIP: ICD-10-CM

## 2021-08-05 DIAGNOSIS — Z01.818 ENCOUNTER FOR OTHER PREPROCEDURAL EXAMINATION: ICD-10-CM

## 2021-08-10 ENCOUNTER — APPOINTMENT (OUTPATIENT)
Dept: DISASTER EMERGENCY | Facility: CLINIC | Age: 66
End: 2021-08-10

## 2021-08-10 DIAGNOSIS — Z01.818 ENCOUNTER FOR OTHER PREPROCEDURAL EXAMINATION: ICD-10-CM

## 2021-08-11 ENCOUNTER — APPOINTMENT (OUTPATIENT)
Dept: DISASTER EMERGENCY | Facility: CLINIC | Age: 66
End: 2021-08-11

## 2021-08-11 PROBLEM — R16.1 SPLENOMEGALY, NOT ELSEWHERE CLASSIFIED: Chronic | Status: ACTIVE | Noted: 2020-11-10

## 2021-08-11 PROBLEM — Z96.651 PRESENCE OF RIGHT ARTIFICIAL KNEE JOINT: Chronic | Status: ACTIVE | Noted: 2021-08-04

## 2021-08-11 PROBLEM — I71.9 AORTIC ANEURYSM OF UNSPECIFIED SITE, WITHOUT RUPTURE: Chronic | Status: ACTIVE | Noted: 2020-11-10

## 2021-08-11 PROBLEM — K80.20 CALCULUS OF GALLBLADDER WITHOUT CHOLECYSTITIS WITHOUT OBSTRUCTION: Chronic | Status: ACTIVE | Noted: 2019-05-16

## 2021-08-11 PROBLEM — Z86.2 PERSONAL HISTORY OF DISEASES OF THE BLOOD AND BLOOD-FORMING ORGANS AND CERTAIN DISORDERS INVOLVING THE IMMUNE MECHANISM: Chronic | Status: ACTIVE | Noted: 2021-08-04

## 2021-08-11 PROBLEM — G47.30 SLEEP APNEA, UNSPECIFIED: Chronic | Status: ACTIVE | Noted: 2019-05-16

## 2021-08-12 ENCOUNTER — FORM ENCOUNTER (OUTPATIENT)
Age: 66
End: 2021-08-12

## 2021-08-12 LAB — SARS-COV-2 N GENE NPH QL NAA+PROBE: NOT DETECTED

## 2021-08-12 RX ORDER — SODIUM CHLORIDE 9 MG/ML
3 INJECTION INTRAMUSCULAR; INTRAVENOUS; SUBCUTANEOUS EVERY 8 HOURS
Refills: 0 | Status: DISCONTINUED | OUTPATIENT
Start: 2021-08-13 | End: 2021-08-14

## 2021-08-13 ENCOUNTER — TRANSCRIPTION ENCOUNTER (OUTPATIENT)
Age: 66
End: 2021-08-13

## 2021-08-13 ENCOUNTER — RESULT REVIEW (OUTPATIENT)
Age: 66
End: 2021-08-13

## 2021-08-13 ENCOUNTER — OUTPATIENT (OUTPATIENT)
Dept: INPATIENT UNIT | Facility: HOSPITAL | Age: 66
LOS: 1 days | Discharge: HOME | End: 2021-08-13
Payer: MEDICARE

## 2021-08-13 VITALS
OXYGEN SATURATION: 96 % | HEIGHT: 63 IN | RESPIRATION RATE: 16 BRPM | WEIGHT: 222.01 LBS | HEART RATE: 60 BPM | TEMPERATURE: 97 F | SYSTOLIC BLOOD PRESSURE: 145 MMHG | DIASTOLIC BLOOD PRESSURE: 96 MMHG

## 2021-08-13 DIAGNOSIS — M16.11 UNILATERAL PRIMARY OSTEOARTHRITIS, RIGHT HIP: ICD-10-CM

## 2021-08-13 DIAGNOSIS — Z90.49 ACQUIRED ABSENCE OF OTHER SPECIFIED PARTS OF DIGESTIVE TRACT: Chronic | ICD-10-CM

## 2021-08-13 LAB
ANION GAP SERPL CALC-SCNC: 3 MMOL/L — LOW (ref 5–17)
BUN SERPL-MCNC: 17 MG/DL — SIGNIFICANT CHANGE UP (ref 7–23)
CALCIUM SERPL-MCNC: 8.6 MG/DL — SIGNIFICANT CHANGE UP (ref 8.5–10.1)
CHLORIDE SERPL-SCNC: 110 MMOL/L — HIGH (ref 96–108)
CO2 SERPL-SCNC: 29 MMOL/L — SIGNIFICANT CHANGE UP (ref 22–31)
CREAT SERPL-MCNC: 0.89 MG/DL — SIGNIFICANT CHANGE UP (ref 0.5–1.3)
GLUCOSE SERPL-MCNC: 116 MG/DL — HIGH (ref 70–99)
HCT VFR BLD CALC: 34.7 % — SIGNIFICANT CHANGE UP (ref 34.5–45)
HGB BLD-MCNC: 11.4 G/DL — LOW (ref 11.5–15.5)
MCHC RBC-ENTMCNC: 30.1 PG — SIGNIFICANT CHANGE UP (ref 27–34)
MCHC RBC-ENTMCNC: 32.9 GM/DL — SIGNIFICANT CHANGE UP (ref 32–36)
MCV RBC AUTO: 91.6 FL — SIGNIFICANT CHANGE UP (ref 80–100)
PLATELET # BLD AUTO: 484 K/UL — HIGH (ref 150–400)
POTASSIUM SERPL-MCNC: 4.1 MMOL/L — SIGNIFICANT CHANGE UP (ref 3.5–5.3)
POTASSIUM SERPL-SCNC: 4.1 MMOL/L — SIGNIFICANT CHANGE UP (ref 3.5–5.3)
RBC # BLD: 3.79 M/UL — LOW (ref 3.8–5.2)
RBC # FLD: 13.8 % — SIGNIFICANT CHANGE UP (ref 10.3–14.5)
SODIUM SERPL-SCNC: 142 MMOL/L — SIGNIFICANT CHANGE UP (ref 135–145)
WBC # BLD: 5.5 K/UL — SIGNIFICANT CHANGE UP (ref 3.8–10.5)
WBC # FLD AUTO: 5.5 K/UL — SIGNIFICANT CHANGE UP (ref 3.8–10.5)

## 2021-08-13 PROCEDURE — 73501 X-RAY EXAM HIP UNI 1 VIEW: CPT | Mod: 26,RT

## 2021-08-13 PROCEDURE — 97116 GAIT TRAINING THERAPY: CPT | Mod: GP

## 2021-08-13 PROCEDURE — 36415 COLL VENOUS BLD VENIPUNCTURE: CPT

## 2021-08-13 PROCEDURE — 82962 GLUCOSE BLOOD TEST: CPT

## 2021-08-13 PROCEDURE — C1713: CPT

## 2021-08-13 PROCEDURE — 88305 TISSUE EXAM BY PATHOLOGIST: CPT | Mod: 26

## 2021-08-13 PROCEDURE — 97162 PT EVAL MOD COMPLEX 30 MIN: CPT | Mod: GP

## 2021-08-13 PROCEDURE — C1776: CPT

## 2021-08-13 PROCEDURE — 97530 THERAPEUTIC ACTIVITIES: CPT | Mod: GP

## 2021-08-13 PROCEDURE — 73501 X-RAY EXAM HIP UNI 1 VIEW: CPT | Mod: RT

## 2021-08-13 PROCEDURE — 99221 1ST HOSP IP/OBS SF/LOW 40: CPT

## 2021-08-13 PROCEDURE — 88305 TISSUE EXAM BY PATHOLOGIST: CPT

## 2021-08-13 PROCEDURE — 80048 BASIC METABOLIC PNL TOTAL CA: CPT

## 2021-08-13 PROCEDURE — 99223 1ST HOSP IP/OBS HIGH 75: CPT

## 2021-08-13 PROCEDURE — 86769 SARS-COV-2 COVID-19 ANTIBODY: CPT

## 2021-08-13 PROCEDURE — 85027 COMPLETE CBC AUTOMATED: CPT

## 2021-08-13 PROCEDURE — C9113: CPT

## 2021-08-13 RX ORDER — PANTOPRAZOLE SODIUM 20 MG/1
40 TABLET, DELAYED RELEASE ORAL ONCE
Refills: 0 | Status: COMPLETED | OUTPATIENT
Start: 2021-08-13 | End: 2021-08-13

## 2021-08-13 RX ORDER — ACETAMINOPHEN 500 MG
1000 TABLET ORAL ONCE
Refills: 0 | Status: COMPLETED | OUTPATIENT
Start: 2021-08-13 | End: 2021-08-13

## 2021-08-13 RX ORDER — PANTOPRAZOLE SODIUM 20 MG/1
40 TABLET, DELAYED RELEASE ORAL
Refills: 0 | Status: DISCONTINUED | OUTPATIENT
Start: 2021-08-13 | End: 2021-08-14

## 2021-08-13 RX ORDER — RIVAROXABAN 15 MG-20MG
10 KIT ORAL DAILY
Refills: 0 | Status: DISCONTINUED | OUTPATIENT
Start: 2021-08-13 | End: 2021-08-14

## 2021-08-13 RX ORDER — QUINAPRIL HYDROCHLORIDE 40 MG/1
1 TABLET, FILM COATED ORAL
Qty: 0 | Refills: 0 | DISCHARGE

## 2021-08-13 RX ORDER — SENNA PLUS 8.6 MG/1
2 TABLET ORAL AT BEDTIME
Refills: 0 | Status: DISCONTINUED | OUTPATIENT
Start: 2021-08-13 | End: 2021-08-14

## 2021-08-13 RX ORDER — MULTIVIT-MIN/FERROUS GLUCONATE 9 MG/15 ML
1 LIQUID (ML) ORAL
Qty: 0 | Refills: 0 | DISCHARGE

## 2021-08-13 RX ORDER — TRAZODONE HCL 50 MG
150 TABLET ORAL AT BEDTIME
Refills: 0 | Status: DISCONTINUED | OUTPATIENT
Start: 2021-08-13 | End: 2021-08-14

## 2021-08-13 RX ORDER — PREGABALIN 225 MG/1
0 CAPSULE ORAL
Qty: 0 | Refills: 0 | DISCHARGE

## 2021-08-13 RX ORDER — ONDANSETRON 8 MG/1
4 TABLET, FILM COATED ORAL ONCE
Refills: 0 | Status: DISCONTINUED | OUTPATIENT
Start: 2021-08-13 | End: 2021-08-13

## 2021-08-13 RX ORDER — ATORVASTATIN CALCIUM 80 MG/1
20 TABLET, FILM COATED ORAL AT BEDTIME
Refills: 0 | Status: DISCONTINUED | OUTPATIENT
Start: 2021-08-13 | End: 2021-08-14

## 2021-08-13 RX ORDER — SODIUM CHLORIDE 9 MG/ML
1000 INJECTION, SOLUTION INTRAVENOUS
Refills: 0 | Status: DISCONTINUED | OUTPATIENT
Start: 2021-08-14 | End: 2021-08-14

## 2021-08-13 RX ORDER — DIAZEPAM 5 MG
1 TABLET ORAL
Qty: 0 | Refills: 0 | DISCHARGE

## 2021-08-13 RX ORDER — HYDROMORPHONE HYDROCHLORIDE 2 MG/ML
0.5 INJECTION INTRAMUSCULAR; INTRAVENOUS; SUBCUTANEOUS ONCE
Refills: 0 | Status: DISCONTINUED | OUTPATIENT
Start: 2021-08-13 | End: 2021-08-14

## 2021-08-13 RX ORDER — AMLODIPINE BESYLATE 2.5 MG/1
2.5 TABLET ORAL DAILY
Refills: 0 | Status: DISCONTINUED | OUTPATIENT
Start: 2021-08-13 | End: 2021-08-14

## 2021-08-13 RX ORDER — SERTRALINE 25 MG/1
100 TABLET, FILM COATED ORAL DAILY
Refills: 0 | Status: DISCONTINUED | OUTPATIENT
Start: 2021-08-13 | End: 2021-08-14

## 2021-08-13 RX ORDER — KETOROLAC TROMETHAMINE 30 MG/ML
15 SYRINGE (ML) INJECTION EVERY 6 HOURS
Refills: 0 | Status: DISCONTINUED | OUTPATIENT
Start: 2021-08-13 | End: 2021-08-14

## 2021-08-13 RX ORDER — METOPROLOL TARTRATE 50 MG
0 TABLET ORAL
Qty: 0 | Refills: 0 | DISCHARGE

## 2021-08-13 RX ORDER — ATORVASTATIN CALCIUM 80 MG/1
1 TABLET, FILM COATED ORAL
Qty: 0 | Refills: 0 | DISCHARGE

## 2021-08-13 RX ORDER — FAMOTIDINE 10 MG/ML
1 INJECTION INTRAVENOUS
Qty: 0 | Refills: 0 | DISCHARGE

## 2021-08-13 RX ORDER — ACETAMINOPHEN 500 MG
2 TABLET ORAL
Qty: 84 | Refills: 0
Start: 2021-08-13 | End: 2021-08-26

## 2021-08-13 RX ORDER — OXYCODONE HYDROCHLORIDE 5 MG/1
5 TABLET ORAL ONCE
Refills: 0 | Status: DISCONTINUED | OUTPATIENT
Start: 2021-08-13 | End: 2021-08-13

## 2021-08-13 RX ORDER — CETIRIZINE HYDROCHLORIDE 10 MG/1
10 TABLET ORAL DAILY
Refills: 0 | Status: DISCONTINUED | OUTPATIENT
Start: 2021-08-13 | End: 2021-08-14

## 2021-08-13 RX ORDER — FENTANYL CITRATE 50 UG/ML
50 INJECTION INTRAVENOUS
Refills: 0 | Status: DISCONTINUED | OUTPATIENT
Start: 2021-08-13 | End: 2021-08-13

## 2021-08-13 RX ORDER — PANTOPRAZOLE SODIUM 20 MG/1
0 TABLET, DELAYED RELEASE ORAL
Qty: 0 | Refills: 0 | DISCHARGE

## 2021-08-13 RX ORDER — OXYCODONE HYDROCHLORIDE 5 MG/1
5 TABLET ORAL
Refills: 0 | Status: DISCONTINUED | OUTPATIENT
Start: 2021-08-13 | End: 2021-08-14

## 2021-08-13 RX ORDER — POLYETHYLENE GLYCOL 3350 17 G/17G
17 POWDER, FOR SOLUTION ORAL AT BEDTIME
Refills: 0 | Status: DISCONTINUED | OUTPATIENT
Start: 2021-08-13 | End: 2021-08-14

## 2021-08-13 RX ORDER — OXYCODONE HYDROCHLORIDE 5 MG/1
1 TABLET ORAL
Qty: 30 | Refills: 0
Start: 2021-08-13

## 2021-08-13 RX ORDER — FAMOTIDINE 10 MG/ML
40 INJECTION INTRAVENOUS AT BEDTIME
Refills: 0 | Status: DISCONTINUED | OUTPATIENT
Start: 2021-08-13 | End: 2021-08-14

## 2021-08-13 RX ORDER — CEFAZOLIN SODIUM 1 G
2000 VIAL (EA) INJECTION EVERY 8 HOURS
Refills: 0 | Status: COMPLETED | OUTPATIENT
Start: 2021-08-13 | End: 2021-08-14

## 2021-08-13 RX ORDER — MAGNESIUM HYDROXIDE 400 MG/1
30 TABLET, CHEWABLE ORAL DAILY
Refills: 0 | Status: DISCONTINUED | OUTPATIENT
Start: 2021-08-13 | End: 2021-08-14

## 2021-08-13 RX ORDER — ACETAMINOPHEN 500 MG
650 TABLET ORAL EVERY 6 HOURS
Refills: 0 | Status: DISCONTINUED | OUTPATIENT
Start: 2021-08-13 | End: 2021-08-14

## 2021-08-13 RX ORDER — POLYETHYLENE GLYCOL 3350 17 G/17G
17 POWDER, FOR SOLUTION ORAL
Qty: 1 | Refills: 0
Start: 2021-08-13

## 2021-08-13 RX ORDER — AMLODIPINE BESYLATE 2.5 MG/1
1 TABLET ORAL
Qty: 0 | Refills: 0 | DISCHARGE

## 2021-08-13 RX ORDER — HYDROMORPHONE HYDROCHLORIDE 2 MG/ML
0.5 INJECTION INTRAMUSCULAR; INTRAVENOUS; SUBCUTANEOUS
Refills: 0 | Status: DISCONTINUED | OUTPATIENT
Start: 2021-08-13 | End: 2021-08-14

## 2021-08-13 RX ORDER — LISINOPRIL 2.5 MG/1
20 TABLET ORAL DAILY
Refills: 0 | Status: DISCONTINUED | OUTPATIENT
Start: 2021-08-13 | End: 2021-08-14

## 2021-08-13 RX ORDER — FLUTICASONE PROPIONATE 50 MCG
1 SPRAY, SUSPENSION NASAL
Qty: 0 | Refills: 0 | DISCHARGE

## 2021-08-13 RX ORDER — ONDANSETRON 8 MG/1
8 TABLET, FILM COATED ORAL EVERY 8 HOURS
Refills: 0 | Status: DISCONTINUED | OUTPATIENT
Start: 2021-08-13 | End: 2021-08-14

## 2021-08-13 RX ORDER — CHOLECALCIFEROL (VITAMIN D3) 125 MCG
1 CAPSULE ORAL
Qty: 0 | Refills: 0 | DISCHARGE

## 2021-08-13 RX ORDER — NITROFURANTOIN MACROCRYSTAL 50 MG
1 CAPSULE ORAL
Qty: 0 | Refills: 0 | DISCHARGE

## 2021-08-13 RX ORDER — PANTOPRAZOLE SODIUM 20 MG/1
1 TABLET, DELAYED RELEASE ORAL
Qty: 30 | Refills: 0
Start: 2021-08-13 | End: 2021-09-11

## 2021-08-13 RX ORDER — ACETAMINOPHEN 500 MG
975 TABLET ORAL ONCE
Refills: 0 | Status: COMPLETED | OUTPATIENT
Start: 2021-08-13 | End: 2021-08-13

## 2021-08-13 RX ORDER — ASCORBIC ACID 60 MG
500 TABLET,CHEWABLE ORAL
Refills: 0 | Status: DISCONTINUED | OUTPATIENT
Start: 2021-08-13 | End: 2021-08-14

## 2021-08-13 RX ORDER — FAMOTIDINE 10 MG/ML
20 INJECTION INTRAVENOUS ONCE
Refills: 0 | Status: COMPLETED | OUTPATIENT
Start: 2021-08-13 | End: 2021-08-13

## 2021-08-13 RX ORDER — METOPROLOL TARTRATE 50 MG
50 TABLET ORAL
Refills: 0 | Status: DISCONTINUED | OUTPATIENT
Start: 2021-08-13 | End: 2021-08-14

## 2021-08-13 RX ORDER — RIVAROXABAN 15 MG-20MG
1 KIT ORAL
Qty: 33 | Refills: 0
Start: 2021-08-13 | End: 2021-09-14

## 2021-08-13 RX ORDER — DIAZEPAM 5 MG
10 TABLET ORAL DAILY
Refills: 0 | Status: DISCONTINUED | OUTPATIENT
Start: 2021-08-13 | End: 2021-08-14

## 2021-08-13 RX ORDER — SENNA PLUS 8.6 MG/1
1 TABLET ORAL
Qty: 7 | Refills: 0
Start: 2021-08-13 | End: 2021-08-19

## 2021-08-13 RX ORDER — SODIUM CHLORIDE 9 MG/ML
1000 INJECTION, SOLUTION INTRAVENOUS
Refills: 0 | Status: DISCONTINUED | OUTPATIENT
Start: 2021-08-13 | End: 2021-08-13

## 2021-08-13 RX ORDER — TRAZODONE HCL 50 MG
0 TABLET ORAL
Qty: 0 | Refills: 0 | DISCHARGE

## 2021-08-13 RX ORDER — CETIRIZINE HYDROCHLORIDE 10 MG/1
1 TABLET ORAL
Qty: 0 | Refills: 0 | DISCHARGE

## 2021-08-13 RX ORDER — SERTRALINE 25 MG/1
0 TABLET, FILM COATED ORAL
Qty: 0 | Refills: 0 | DISCHARGE

## 2021-08-13 RX ORDER — OXYCODONE HYDROCHLORIDE 5 MG/1
10 TABLET ORAL ONCE
Refills: 0 | Status: DISCONTINUED | OUTPATIENT
Start: 2021-08-13 | End: 2021-08-13

## 2021-08-13 RX ORDER — TRAMADOL HYDROCHLORIDE 50 MG/1
50 TABLET ORAL EVERY 6 HOURS
Refills: 0 | Status: DISCONTINUED | OUTPATIENT
Start: 2021-08-13 | End: 2021-08-14

## 2021-08-13 RX ORDER — ASCORBIC ACID 60 MG
1 TABLET,CHEWABLE ORAL
Qty: 0 | Refills: 0 | DISCHARGE

## 2021-08-13 RX ORDER — OXYCODONE HYDROCHLORIDE 5 MG/1
10 TABLET ORAL
Refills: 0 | Status: DISCONTINUED | OUTPATIENT
Start: 2021-08-13 | End: 2021-08-14

## 2021-08-13 RX ORDER — FOLIC ACID 0.8 MG
1 TABLET ORAL DAILY
Refills: 0 | Status: DISCONTINUED | OUTPATIENT
Start: 2021-08-13 | End: 2021-08-14

## 2021-08-13 RX ORDER — FLUTICASONE PROPIONATE 50 MCG
1 SPRAY, SUSPENSION NASAL DAILY
Refills: 0 | Status: DISCONTINUED | OUTPATIENT
Start: 2021-08-13 | End: 2021-08-14

## 2021-08-13 RX ADMIN — OXYCODONE HYDROCHLORIDE 10 MILLIGRAM(S): 5 TABLET ORAL at 10:55

## 2021-08-13 RX ADMIN — Medication 15 MILLIGRAM(S): at 17:44

## 2021-08-13 RX ADMIN — Medication 975 MILLIGRAM(S): at 07:02

## 2021-08-13 RX ADMIN — RIVAROXABAN 10 MILLIGRAM(S): KIT at 22:15

## 2021-08-13 RX ADMIN — SODIUM CHLORIDE 3 MILLILITER(S): 9 INJECTION INTRAMUSCULAR; INTRAVENOUS; SUBCUTANEOUS at 22:15

## 2021-08-13 RX ADMIN — Medication 50 MILLIGRAM(S): at 22:15

## 2021-08-13 RX ADMIN — Medication 100 MILLIGRAM(S): at 16:11

## 2021-08-13 RX ADMIN — Medication 1000 MILLIGRAM(S): at 15:27

## 2021-08-13 RX ADMIN — ATORVASTATIN CALCIUM 20 MILLIGRAM(S): 80 TABLET, FILM COATED ORAL at 22:15

## 2021-08-13 RX ADMIN — Medication 500 MILLIGRAM(S): at 22:15

## 2021-08-13 RX ADMIN — OXYCODONE HYDROCHLORIDE 10 MILLIGRAM(S): 5 TABLET ORAL at 10:25

## 2021-08-13 RX ADMIN — Medication 400 MILLIGRAM(S): at 15:05

## 2021-08-13 RX ADMIN — Medication 650 MILLIGRAM(S): at 18:36

## 2021-08-13 RX ADMIN — PANTOPRAZOLE SODIUM 40 MILLIGRAM(S): 20 TABLET, DELAYED RELEASE ORAL at 11:20

## 2021-08-13 RX ADMIN — FAMOTIDINE 40 MILLIGRAM(S): 10 INJECTION INTRAVENOUS at 22:14

## 2021-08-13 RX ADMIN — Medication 15 MILLIGRAM(S): at 18:37

## 2021-08-13 RX ADMIN — FAMOTIDINE 20 MILLIGRAM(S): 10 INJECTION INTRAVENOUS at 07:02

## 2021-08-13 RX ADMIN — Medication 650 MILLIGRAM(S): at 17:47

## 2021-08-13 RX ADMIN — HYDROMORPHONE HYDROCHLORIDE 0.5 MILLIGRAM(S): 2 INJECTION INTRAMUSCULAR; INTRAVENOUS; SUBCUTANEOUS at 11:48

## 2021-08-13 NOTE — PHYSICAL THERAPY INITIAL EVALUATION ADULT - GENERAL OBSERVATIONS, REHAB EVAL
The pt was pleasant and cooperative, received on 2N, supine and eager to participate in PT evaluation. 1 Abduction Pillow in place, 1 IV, and bilateral SCDs in place.

## 2021-08-13 NOTE — PHYSICAL THERAPY INITIAL EVALUATION ADULT - FOLLOWS COMMANDS/ANSWERS QUESTIONS, REHAB EVAL
Post Acute Skilled Nursing Home Subsequent Visit Note    Date of Service: 1/2/2020  Location seen at: [facility] AT [address] [place] [address]  Subacute / Skilled Need: Rehabilitation and Wound Care    PCP: No Pcp   Patient Care Team:  No Pcp as PCP - General Makenna Pelaez MD as Post Acute Facility Provider: Physician (Internal Medicine)  Roselyn Rouse CNP as Post Acute Facility Provider: APC (Nurse Practitioner - Family)  Seen by Roselyn Rouse CNP today    Ayse Troy is a 72 year old female presenting to Post Acute senior care for: pt/ot wound care.   History of Present Illness:Patient was at [facility] for 4 weeks. She is s/p transmetatarsal amputation.    PMH: DM, CHF, Renal disease, HLD. Sp fall without hitting head.  Patient also had low albumin, total protein, dm necrotic foot, obesity. Patient stated she lives with her friend and apartment on the fourth floor and there are 7 steps to enter the home.  Patient was able to do independent activities of daily living and transfer.  Patient ambulates with a cane or a walker.  Patient was originally taken to the ER for a wound check of her left foot.  Patient underwent advancement of partial amputation.  Patient evidently was recently at [facility].  Patient was given IV antibiotics.  Infectious disease and podiatry was consulted.  Patient claims she is allergic to vancomycin she itches.  Left foot metatarsal cleanse with ns, pain with betadine lightly pack with iodoform strips apply gaue abd and wrap qd  Left foot palntas cleans with ns, apply povidine to bed Kerlix qd.  Limited records from hospital. Patient lying in bed moainng stating I can not breath, I feel like I am taking my last breath.  Patient stating between 88-90% on ra. Patient being placed on 02 2l nc. Patient was just given bp medication for bp of 170/96.  Will monitor if no improvement will send to ER.  12/23 gomez with patient regarding poor wound  healing.  Also discussed with patient the possibility of losing more of her foot possible below the knee amputation.  Patient very concerned.  Patient otherwise denies any shortness of breath chest pain headaches numbness tingling dizziness fever chills urinary or bowel issues.  No appetite changes.  Discussed with nursing.  12/26 patient lying in bed.  Patient complained of left foot tenderness when she eats on top of it.  Otherwise patient denies any shortness of breath chest pain headaches numbness tingling dizziness fever chills urinary or bowel issues.  No appetite changes.  Again discussed with patient regarding the possibility of further amputation on left foot.  She stated understanding.   12/30 patient sitting up in chair.  Patient complains of lower extremity edema worsening since being up in the chair.  Patient complains of left foot pain.  Patient otherwise denies any shortness of breath chest pain headaches numbness tingling dizziness fever chills urinary or bowel issues.  Above copied from prior providers note      1/2 lengthy discussion with patient's daughter Katerine, patient, son via the phone with children.  Discussed with patient the poor wound healing and the possibility of further surgery as well as amputation.  Discussed with patient and daughter and son that patient's wound is not looking good.  Patient may need debridement versus more resection of foot versus BKA.  Lengthy discussion with the daughter and son regarding blood pressure medications also explained that patient's on blood pressure medicines due to prior history of blood pressure elevation which has improved while being on medication.  Daughter feels that patient should be on less blood pressure medicine.  Reviewed hospital records which shows blood pressure was higher.  During patient stay here blood pressure is improved and is averaging about 1 40-1 50 in the 70s.  She stated understanding.  Daughter was tearful.  Patient  otherwise denies any shortness of breath chest pain headaches numbness tingling dizziness fever chills urinary/ Co some constipation. Per nursing patient is having bm's.    HISTORY  Past Medical, Social, Surgical, and Family History was reviewed with the patient and was updated, as needed.    ADVANCE DIRECTIVES:  Power of  Status:  Status Unknown  Code Status:  discussed and advised the patient to complete one  Goals of Care: rehabilitate and prolong survival    ALLERGIES:  Allergies as of 01/02/2020   • (Not on File)       CURRENT MEDICATIONS:   Medications reviewed / reconciled: Yes    BASELINE FUNCTIONAL STATUS:  Independent    CURRENT FUNCTIONAL STATUS:  Non weight bearing (NWB), Weight bearing as tolerated (WBAT), Wheelchair, Bedbound and Johnny lift    REVIEW OF SYSTEMS:  Review of Systems   All other systems reviewed and are negative.      VITALS:  Vitals:    01/02/20 1146   BP: 170/77   Pulse: 74   Resp: 18   Temp: 97.6 °F (36.4 °C)   SpO2: 97%   Weight: (!) 153.5 kg (338 lb 4.8 oz)   PainSc:  0       PHYSICAL ASSESSMENT:  Physical Exam  Vitals signs reviewed.   Constitutional:       Appearance: Normal appearance.   HENT:      Head: Normocephalic and atraumatic.      Right Ear: External ear normal.      Left Ear: External ear normal.      Nose: Nose normal.      Mouth/Throat:      Mouth: Mucous membranes are moist.      Pharynx: Oropharynx is clear.   Eyes:      Conjunctiva/sclera: Conjunctivae normal.   Neck:      Musculoskeletal: Normal range of motion.   Cardiovascular:      Rate and Rhythm: Normal rate and regular rhythm.      Pulses: Normal pulses.      Heart sounds: Normal heart sounds.   Pulmonary:      Effort: Pulmonary effort is normal.      Breath sounds: Normal breath sounds.   Abdominal:      General: Bowel sounds are normal.   Musculoskeletal: Normal range of motion.         General: Swelling present.      Right lower leg: Edema present.      Left lower leg: Edema present.      Comments:  grazyna lower ext edema  Left foot with dressing cdi   Skin:     General: Skin is warm.   Neurological:      General: No focal deficit present.      Mental Status: She is alert. Mental status is at baseline.   Psychiatric:         Mood and Affect: Mood normal.         LABS:  na    ASSESSMENT AND PLAN  Assessment   Polyneuropathy  Continue with medication and monitor  Follow-up with PCP as an outpatient  No new issues    Hypertension  Continue with aspirin nifedipine isosorbide hydralazine atorvastatin labetalol Lasix     Follow-up with PCP as an outpatient  HOLLY diet  No new issues  Discussed with daughter Katerine and son Willem over the phone 30 minutes    Morbid obesity with BMI of 40.0-44.9, adult (CMS/[facility])  Weight loss    Stage 2 chronic kidney disease  Monitor  Continue with medication and monitor  Follow-up with PCP as an outpatient  Follow-up with renal as an outpatient  No new issues    Proteinuria  Monitor follow-up with PCP and neurology    Diabetic foot infection (CMS/[facility])  Follow-up with surgery  Wound healing is very poor.  Discussed with patient's daughter and son as well as the patient regarding wound and patient will be following up with surgery on 1/ 4   Discussed with patient daughter and son regarding the possibility patient may have further amputation wound debridement possibility of losing the leg.    Leg edema  Continue with medication and monitor  Leg elevation  Unchanged    Foot ulcer, left (CMS/[facility])  Status post transmetatarsal amputation  Follow-up with podiatry on 1/4  No new issues  Poor wound healing  Discussed with patient family regarding poor wound healing and possibility of further amputation and loss of leg    Gangrene of toe (CMS/[facility])  Status post transmetatarsal amp  Follow-up with podiatry    Hypoalbuminemia  Monitor no new issues    Anemia due to chronic kidney disease  Continue with iron and Aranesp and vitamin C  Monitor    Debility  Continue with physical therapy and Occupational  Therapy    Type II or unspecified type diabetes mellitus with renal manifestations, uncontrolled(250.42) (CMS/[facility])  Continue with Accu-Cheks and monitor  No new issues  ADA diet    Noncompliance  Monitor  No new issues        DISCHARGE PLANNING: home vs 24/7 care vs ltcc vs assisted living    Discussed with: RN / Nursing, Family and Patient    Barriers to discharge: therapy needs and equipment needed (DME, nebulizer, home O2)    Total time spent is more than 60 minutes, with more than 50% of the time spent in coordination of care, counseling, review of records and discussion of plan of care with the patient /staff /family.       75% of the time/able to follow single-step instructions

## 2021-08-13 NOTE — CONSULT NOTE ADULT - SUBJECTIVE AND OBJECTIVE BOX
PCP: Dr. Garcia    CHIEF COMPLAINT:  inc right hip pain    HISTORY OF THE PRESENT ILLNESS: this is a  67yo female with PMH of CAD, gastric bypass , GERD, HTN, aortic aneurysm, depression, anxiety, essential thrombocytopenia, splenomegaly, diverticulosis, osteoporosis, OA of the right hip with progressive pain with ambulation, climbing stairs and inc pain at night, who failed the usual modalities for hip pain and is now s/p right THR. Pt is seen in PACU, in NAD, c/o incisional pain at hip and epigastric pain. Denies HA, dizziness, chest pain, SOB, nausea.  We are consulted for medical management    PAST MEDICAL HISTORY: as above    PAST SURGICAL HISTORY:   - abdominoplasty  - b/l knee arthroscopy  - b/l breast reduction sx  - dilation and curettage  - gastric bypass with revision  - cholecystectomy  - pelvic reconstruction sx    FAMILY HISTORY:    Mother- OA, emphysema, parkinson's disease, DM2  Father- prostate CA  Maternal Grandmother- parkinson's disease, gout  Sister - alive fibromyalgia  Son - , cardiomyopathy    SOCIAL HISTORY:  former smoker, quit 27yrs ago 7yr x 1-2PPD, social alcohol, denies recreational drugs    ALLERGIES: Sulfa Drugs (rash), benadryl    HOME MEDS: see med rec    REVIEW OF SYSTEMS:   All 10 systems reviewed in detailed and found to be negative with the exception of what has already been described above    MEDICATIONS  (STANDING):  lactated ringers. 1000 milliLiter(s) (100 mL/Hr) IV Continuous <Continuous>    MEDICATIONS  (PRN):  fentaNYL    Injectable 50 MICROGram(s) IV Push every 10 minutes PRN Severe Pain (7 - 10)  ondansetron Injectable 4 milliGRAM(s) IV Push once PRN Nausea and/or Vomiting  oxyCODONE    IR 5 milliGRAM(s) Oral once PRN Moderate Pain (4 - 6)    VITALS:  T(F): 97.9 (21 @ 09:37), Max: 97.9 (21 @ 09:37)  HR: 53 (21 @ 10:30) (53 - 60)  BP: 132/76 (21 @ 10:30) (94/83 - 145/96)  RR: 17 (21 @ 10:30) (12 - 17)  SpO2: 97% (21 @ 10:30) (96% - 100%)  Wt(kg): --    I&O's Summary    13 Aug 2021 07:01  -  13 Aug 2021 11:12  --------------------------------------------------------  IN: 1000 mL / OUT: 0 mL / NET: 1000 mL    CAPILLARY BLOOD GLUCOSE  POCT Blood Glucose.: 110 mg/dL (13 Aug 2021 09:39)  POCT Blood Glucose.: 125 mg/dL (13 Aug 2021 06:24)    PHYSICAL EXAM:    HEENT:  pupils equal and reactive, EOMI  NECK:   supple, no palpable lymph nodes  CV: +S1, +S2, no murmurs, gallops or rubs heard  RESP: lungs clear b/l. No wheezing, rhonchi, rales  BREAST:  not examined  GI: abdomen soft, non-tender, non-distended, normal BS  RECTAL:  not examined  :  not examined  MSK: + right hip dressing with trace blood.   EXT: no edema, no calf pain, swelling or erythema  VASCULAR:  pulses equal and symmetric in the upper and lower extremities  NEURO:  AAOX3, no focal neurological deficits, follows all commands, able to move extremities spontaneously  SKIN:  no ulcers, lesions or rashes    LABS:                        11.4   5.50  )-----------( 484      ( 13 Aug 2021 09:56 )             34.7     08-13    142  |  110<H>  |  17  ----------------------------<  116<H>  4.1   |  29  |  0.89    Ca    8.6      13 Aug 2021 09:56    IMPRESSION:  67yo female with above pmh a/w:  # INC pain in right hip  # S/P right/left THR/TKR  - POD#0  - pain control  - PT eval  - Bowel regimen  - IVF's  - Finish ABXs  - regular diet  - PPI    # HTN  - monitor BP  - cont metoprolol, norvasc, quinapril- HCTZ    #CAD  - cont ASA, atorvastatin  - TTE 3/2021: LVEF 55-60%, mild mod TR, mild pulm HTN, trace MR    #GERD/IBS  - cont PPI, pepcid    #hx of anemia/thrombocytosis CALR mutation/depression/ anxiety/ bladder prolapse, urethral stenosis s/p dilation with recurrent UTIs (on suppressive tx)  - followed by heme as outpatient  - cont sertraline, diazepam, zoloft, trazodone  - cont oxybutynin, nitrofurantoin    # Vte prophylaxis  - AC consult  - Venodynes  - INC mobility      Thank you for the consult, will follow       PCP: Dr. Garcia    CHIEF COMPLAINT:  inc right hip pain    HISTORY OF THE PRESENT ILLNESS: this is a  67yo female with PMH of CAD, gastric bypass , GERD, HTN, aortic aneurysm, depression, anxiety, essential thrombocytopenia, splenomegaly, diverticulosis, osteoporosis, OA of the right hip with progressive pain with ambulation, climbing stairs and inc pain at night, who failed the usual modalities for hip pain and is now s/p right THR. Pt is seen in PACU, in NAD, c/o incisional pain at hip and epigastric pain. Denies HA, dizziness, chest pain, SOB, nausea.  We are consulted for medical management    PAST MEDICAL HISTORY: as above    PAST SURGICAL HISTORY:   - abdominoplasty  - b/l knee arthroscopy  - b/l breast reduction sx  - dilation and curettage  - gastric bypass with revision  - cholecystectomy  - pelvic reconstruction sx    FAMILY HISTORY:    Mother- OA, emphysema, parkinson's disease, DM2  Father- prostate CA  Maternal Grandmother- parkinson's disease, gout  Sister - alive fibromyalgia  Son - , cardiomyopathy    SOCIAL HISTORY:  former smoker, quit 27yrs ago 7yr x 1-2PPD, social alcohol, denies recreational drugs    ALLERGIES: Sulfa Drugs (rash), benadryl    HOME MEDS: see med rec    REVIEW OF SYSTEMS:   All 10 systems reviewed in detailed and found to be negative with the exception of what has already been described above    MEDICATIONS  (STANDING):  lactated ringers. 1000 milliLiter(s) (100 mL/Hr) IV Continuous <Continuous>    MEDICATIONS  (PRN):  fentaNYL    Injectable 50 MICROGram(s) IV Push every 10 minutes PRN Severe Pain (7 - 10)  ondansetron Injectable 4 milliGRAM(s) IV Push once PRN Nausea and/or Vomiting  oxyCODONE    IR 5 milliGRAM(s) Oral once PRN Moderate Pain (4 - 6)    VITALS:  T(F): 97.9 (21 @ 09:37), Max: 97.9 (21 @ 09:37)  HR: 53 (21 @ 10:30) (53 - 60)  BP: 132/76 (21 @ 10:30) (94/83 - 145/96)  RR: 17 (21 @ 10:30) (12 - 17)  SpO2: 97% (21 @ 10:30) (96% - 100%)  Wt(kg): --    I&O's Summary    13 Aug 2021 07:01  -  13 Aug 2021 11:12  --------------------------------------------------------  IN: 1000 mL / OUT: 0 mL / NET: 1000 mL    CAPILLARY BLOOD GLUCOSE  POCT Blood Glucose.: 110 mg/dL (13 Aug 2021 09:39)  POCT Blood Glucose.: 125 mg/dL (13 Aug 2021 06:24)    PHYSICAL EXAM:    HEENT:  pupils equal and reactive, EOMI  NECK:   supple, no palpable lymph nodes  CV: +S1, +S2, no murmurs, gallops or rubs heard  RESP: lungs clear b/l. No wheezing, rhonchi, rales  BREAST:  not examined  GI: abdomen soft, non-tender, non-distended, normal BS  RECTAL:  not examined  :  not examined  MSK: + right hip dressing with trace blood.   EXT: no edema, no calf pain, swelling or erythema  VASCULAR:  pulses equal and symmetric in the upper and lower extremities  NEURO:  AAOX3, no focal neurological deficits, follows all commands, able to move extremities spontaneously  SKIN:  no ulcers, lesions or rashes    LABS:                        11.4   5.50  )-----------( 484      ( 13 Aug 2021 09:56 )             34.7     08-    142  |  110<H>  |  17  ----------------------------<  116<H>  4.1   |  29  |  0.89    Ca    8.6      13 Aug 2021 09:56    IMPRESSION:  67yo female with above pmh a/w:  # INC pain in right hip  # S/P right THR  - POD#0  - pain control  - PT eval  - Bowel regimen  - IVF's  - Finish ABXs  - regular diet  - PPI    # HTN  - monitor BP  - cont metoprolol, norvasc, quinapril- HCTZ    #CAD  - cont ASA, atorvastatin  - TTE 3/2021: LVEF 55-60%, mild mod TR, mild pulm HTN, trace MR    #GERD/IBS  - cont PPI, pepcid    #hx of anemia/thrombocytosis CALR mutation/depression/ anxiety/ bladder prolapse, urethral stenosis s/p dilation with recurrent UTIs (on suppressive tx)  - followed by heme as outpatient  - cont sertraline, diazepam, zoloft, trazodone  - cont oxybutynin, nitrofurantoin    # Vte prophylaxis  - AC consult  - Venodynes  - INC mobility      Thank you for the consult, will follow       PCP: Dr. Garcia    CHIEF COMPLAINT:  inc right hip pain    HISTORY OF THE PRESENT ILLNESS: this is a  67yo female with PMH of HTN, HLD, gastric bypass 2003, GERD, aortic aneurysm, depression, anxiety, essential thrombocytopenia, splenomegaly, diverticulosis, osteoporosis, OA of the right hip with progressive pain with ambulation, climbing stairs and inc pain at night, who failed the usual modalities for hip pain and is now s/p right THR. Pt is seen in PACU, in NAD, c/o incisional pain at hip and epigastric pain. Denies HA, dizziness, chest pain, SOB, nausea.  We are consulted for medical management  REVIEW OF SYSTEMS:   All 10 systems reviewed in detailed and found to be negative with the exception of what has already been described above    PAST MEDICAL HISTORY: as above    PAST SURGICAL HISTORY:   - abdominoplasty  - b/l knee arthroscopy  - b/l breast reduction sx  - dilation and curettage  - gastric bypass with revision  - cholecystectomy  - pelvic reconstruction sx    FAMILY HISTORY:    Mother- OA, emphysema, parkinson's disease, DM2  Father- prostate CA  Maternal Grandmother- parkinson's disease, gout  Sister - alive fibromyalgia  Son - , cardiomyopathy    SOCIAL HISTORY:  former smoker, quit 27yrs ago 7yr x 1-2PPD, social alcohol, denies recreational drugs    ALLERGIES: Sulfa Drugs (rash), benadryl    HOME MEDS: see med rec      VITALS:  T(F): 97.9 (21 @ 09:37), Max: 97.9 (21 @ 09:37)  HR: 53 (21 @ 10:30) (53 - 60)  BP: 132/76 (21 @ 10:30) (94/83 - 145/96)  RR: 17 (21 @ 10:30) (12 - 17)  SpO2: 97% (21 @ 10:30) (96% - 100%)      PHYSICAL EXAM:    HEENT:  pupils equal and reactive, EOMI  NECK:   supple, no palpable lymph nodes  CV: +S1, +S2, no murmurs, gallops or rubs heard  RESP: lungs clear b/l. No wheezing, rhonchi, rales  BREAST:  not examined  GI: abdomen soft, non-tender, non-distended, normal BS  RECTAL:  not examined  :  not examined  MSK: + right hip dressing with trace blood.   EXT: no edema, no calf pain, swelling or erythema  VASCULAR:  pulses equal and symmetric in the upper and lower extremities  NEURO:  AAOX3, no focal neurological deficits, follows all commands, able to move extremities spontaneously  SKIN:  no rashes    LABS:                        11.4   5.50  )-----------( 484      ( 13 Aug 2021 09:56 )             34.7     08-13    142  |  110<H>  |  17  ----------------------------<  116<H>  4.1   |  29  |  0.89    Ca    8.6      13 Aug 2021 09:56    MEDICATIONS  (STANDING):  rivaroxaban 10 milliGRAM(s) Oral daily    MEDICATIONS  (PRN):  HYDROmorphone  Injectable 0.5 milliGRAM(s) IV Push every 10 minutes PRN Moderate Pain (4 - 6)    IMPRESSION:  67yo female with above pmh a/w:    # OA right hip  # S/P right THR  - POD#0  - pain control  - PT eval  - Bowel regimen  - IVF's  - Finish ABXs  - regular diet  - PPI    #Acute blood loss anemia:  -d/t surgery  -no need for transfusion at this time.     # HTN  - monitor BP  - cont metoprolol, norvasc, ace-i  -hold hctz for now.    #HLD:  -statin.     #GERD/IBS  - cont PPI, pepcid    #Essential thrombocytosis:  - followed by heme outpt.    #Depression/axniety:  -continue sertraline, diazepam, zoloft, trazodone.    #H/o Overactive bladder:  -hold oxybutinin for now.     # Vte prophylaxis  - AC consult  - Venodynes  - INC mobility      Thank you for the consult, will follow       PCP: Dr. Garcia    CHIEF COMPLAINT:  inc right hip pain    HISTORY OF THE PRESENT ILLNESS: this is a  65yo female with PMH of HTN, HLD, gastric bypass 2003, GERD, aortic aneurysm, depression, anxiety, essential thrombocytopenia, splenomegaly, diverticulosis, osteoporosis, OA of the right hip with progressive pain with ambulation, climbing stairs and inc pain at night, who failed the usual modalities for hip pain and is now s/p right THR. Pt is seen in PACU, in NAD, c/o incisional pain at hip and epigastric pain. Denies HA, dizziness, chest pain, SOB, nausea.  We are consulted for medical management  REVIEW OF SYSTEMS:   All 10 systems reviewed in detailed and found to be negative with the exception of what has already been described above    PAST MEDICAL HISTORY: as above    PAST SURGICAL HISTORY:   - abdominoplasty  - b/l knee arthroscopy  - b/l breast reduction sx  - dilation and curettage  - gastric bypass with revision  - cholecystectomy  - pelvic reconstruction sx    FAMILY HISTORY:    Mother- OA, emphysema, parkinson's disease, DM2  Father- prostate CA  Maternal Grandmother- parkinson's disease, gout  Sister - alive fibromyalgia  Son - , cardiomyopathy    SOCIAL HISTORY:  former smoker, quit 27yrs ago 7yr x 1-2PPD, social alcohol, denies recreational drugs    ALLERGIES: Sulfa Drugs (rash), benadryl    HOME MEDS: see med rec      VITALS:  T(F): 97.9 (21 @ 09:37), Max: 97.9 (21 @ 09:37)  HR: 53 (21 @ 10:30) (53 - 60)  BP: 132/76 (21 @ 10:30) (94/83 - 145/96)  RR: 17 (21 @ 10:30) (12 - 17)  SpO2: 97% (21 @ 10:30) (96% - 100%)      PHYSICAL EXAM:    HEENT:  pupils equal and reactive, EOMI  NECK:   supple, no palpable lymph nodes  CV: +S1, +S2, no murmurs, gallops or rubs heard  RESP: lungs clear b/l. No wheezing, rhonchi, rales  BREAST:  not examined  GI: abdomen soft, non-tender, non-distended, normal BS  RECTAL:  not examined  :  not examined  MSK: + right hip dressing with trace blood.   EXT: no edema, no calf pain, swelling or erythema  VASCULAR:  pulses equal and symmetric in the upper and lower extremities  NEURO:  AAOX3, no focal neurological deficits, follows all commands, able to move extremities spontaneously  SKIN:  no rashes    LABS:                        11.4   5.50  )-----------( 484      ( 13 Aug 2021 09:56 )             34.7     08-13    142  |  110<H>  |  17  ----------------------------<  116<H>  4.1   |  29  |  0.89    Ca    8.6      13 Aug 2021 09:56    MEDICATIONS  (STANDING):  rivaroxaban 10 milliGRAM(s) Oral daily    MEDICATIONS  (PRN):  HYDROmorphone  Injectable 0.5 milliGRAM(s) IV Push every 10 minutes PRN Moderate Pain (4 - 6)    IMPRESSION:  65yo female with above pmh a/w:    # OA right hip  # S/P right THR  - POD#0  - pain control  - PT eval  - Bowel regimen  - IVF's  - Finish ABXs  - regular diet  - PPI    #Acute blood loss anemia:  -d/t surgery  -no need for transfusion at this time.     # HTN  - monitor BP  - cont metoprolol, norvasc, ace-i with hold parameters    #HLD:  -statin.     #GERD/IBS  - cont PPI, pepcid    #Essential thrombocytosis:  - followed by heme outpt.    #Depression/axniety:  -continue sertraline, diazepam, zoloft, trazodone.      # Vte prophylaxis  - AC consult  - Venodynes  - INC mobility      Thank you for the consult, will follow

## 2021-08-13 NOTE — CONSULT NOTE ADULT - ATTENDING COMMENTS
Pt seen and examined on 2N. D/w YAHIR Monteiro. Agree with documentation as above with changes made where appropriate.   c/o spasm right hip/right leg. Also has intermittent epigastric pain which her GI told her was related to duodenal inflammation.   Got dilaudid with improvement.   Physical therapy  incentive spirometry  bowel regimen.   monitor h/h  hold hctz/oxybutinin as above.     thank you, will follow. Pt seen and examined on 2N. D/w YAHIR Monteiro. Agree with documentation as above with changes made where appropriate.   c/o spasm right hip/right leg. Also has intermittent epigastric pain which her GI told her was related to duodenal inflammation.   Got dilaudid with improvement.   Physical therapy  incentive spirometry  bowel regimen.   monitor h/h    thank you, will follow.

## 2021-08-13 NOTE — DISCHARGE NOTE PROVIDER - NSDCCPCAREPLAN_GEN_ALL_CORE_FT
PRINCIPAL DISCHARGE DIAGNOSIS  Diagnosis: Osteoarthritis of left hip  Assessment and Plan of Treatment:       SECONDARY DISCHARGE DIAGNOSES  Diagnosis: Osteoarthritis of left hip  Assessment and Plan of Treatment:

## 2021-08-13 NOTE — DISCHARGE NOTE PROVIDER - NSDCFUADDINST_GEN_ALL_CORE_FT
Discharge Instructions for Right**/**Left Total Hip Arthroplasty:    1. ACTIVITY: WBAT. Rolling walker. Posterior Hip Dislocation Precautions. Abduction Pillow while in bed for 6 weeks. Daily PT.  2. CALL FOR: fever over 101, wound redness, drainage or open area, calf pain/calf swelling.  3. BANDAGE: Change dressing to a new Mepilex Ag bandage POD7 (8/20/21). May change sooner if dressing saturated or falling off. DO NOT REMOVE BANDAGE TO CHECK WOUND ON INTAKE.  4. STAPLES: RN Remove Staples POD14 (8/27/21).  5. SHOWER: Okay to shower, do not submerge, no baths, no hot tubs   6. DVT PE Prophylaxis: Managed by Anticoag Team.  See Med Rec.  7.  GI: Continue Protonix daily while on Anticoagulant. eRx has been sent to your pharmacy.  8. LABS: INR only if on Coumadin.  9.  FOLLOW UP: Dr. Hidalgo in 1 month. Call to schedule.  10. MEDICATION: eRX sent to your pharmacy for  if you go home.   11.**Call office if medications not covered under your insurance, especially BLOOD CLOT PREVENTION/anticoagulant medication.

## 2021-08-13 NOTE — CONSULT NOTE ADULT - SUBJECTIVE AND OBJECTIVE BOX
HPI:      Patient is a 66y old  Female who presents with a chief complaint of right hip  pain now s/p Right Hip arthroplasty (13 Aug 2021 11:10). Consulted by     for VTE prophylaxis, risk stratification, and anticoagulation management.    PAST MEDICAL & SURGICAL HISTORY:  Gastric bypass status for obesity  2003    HTN (hypertension)    GERD (gastroesophageal reflux disease)    Osteoarthritis    Depression    Anxiety    Gallstones  h/o- ccy    Sleep apnea  awating results    CAD (coronary artery disease)    Hypothyroid    Polyp of corpus uteri    Mitral valve regurgitation    Hanny&#x27;s syndrome    Hemorrhoids    Aortic aneurysm without rupture  CT scan every year for monitoring has been stable for the past years    Diverticulosis    Congenital anomaly of aorta    Splenomegaly  mild to moderate- followed by hematologist    Anemia    Tear of lateral meniscus of right knee    HLD (hyperlipidemia)    Chronic UTI (urinary tract infection)    Insomnia    Hernia    Achilles tendinosis of left lower extremity    Peroneal tendinitis of left lower extremity    Chronic pain of right ankle    Morbid obesity    Osteopenia    History of thrombocytosis    H/O total knee replacement, right    Hx of gastric bypass  revision of gastric bypass    H/O dilation and curettage    Benign cyst of skin    H/O arthroscopy of left knee    H/O arthroscopy of right knee    H/O bilateral breast reduction surgery    H/O abdominoplasty    Acquired female bladder prolapse    S/P cholecystectomy    Interval History  2021:Patient seen at bedside in PACU, Discussed Xarelto and the risks and benefits with patient. Patient denies any h/o cancer, VTE, stroke or bleeding. Emphasized the importance of taking medication daily to prevent VTE. Verbalized understanding and is in agreement with treatment plan.        CrCl:97.4  EBL:150ml  BMI:39.3    Caprini VTE Risk Score:CAPRINI SCORE  AGE RELATED RISK FACTORS                                                       MOBILITY RELATED FACTORS  [ ] Age 41-60 years                                            (1 Point)                  [ ] Bed rest /restricted mobility                             (1 Point)  [ x] Age: 61-74 years                                           (2 Points)                [ ] Plaster cast                                                   (2 Points)  [ ] Age= 75 years                                              (3 Points)                 [ ] Bed bound for more than 72 hours                   (2 Points)    DISEASE RELATED RISK FACTORS                                               GENDER SPECIFIC FACTORS  [ ] Edema in the lower extremities                       (1 Point)           [ ] Pregnancy                                                            (1 Point)  [ ] Varicose veins                                               (1 Point)                  [ ] Post-partum < 6 weeks                                      (1 Point)             [x ] BMI > 25 Kg/m2                                            (1 Point)                  [ ] Hormonal therapy or oral contraception       (1 Point)                 [ ] Sepsis (in the previous month)                        (1 Point)             [ ] History of pregnancy complications                (1Point)  [ ] Pneumonia or serious lung disease                                             [ ] Unexplained or recurrent  (=/>3), premature                                 (In the previous month)                               (1 Point)                birth with toxemia or growth-restricted infant (1 Point)  [ ] Abnormal pulmonary function test            (1 Point)                                   SURGERY RELATED RISK FACTORS  [ ] Acute myocardial infarction                       (1 Point)                  [ ]  Section                                         (1 Point)  [ ] Congestive heart failure (in the previous month) (1 Point)   [ ] Minor surgery   lasting <45 minutes       (1 Point)   [ ] Inflammatory bowel disease                             (1 Point)          [ ] Arthroscopic surgery                                  (2 Points)  [ ] Central venous access                                    (2 Points)            [ ] General surgery lasting >45 minutes      (2 Points)       [ ] Stroke (in the previous month)                  (5 Points)            [x ] Elective major lower extremity arthroplasty (5 Points)                                   [  ] Malignancy (present or past include skin melanoma                                          but exclude  basal skin cell)    (2 points)                                      TRAUMA RELATED RISK FACTORS                HEMATOLOGY RELATED FACTORS                                  [ ] Fracture of the hip, pelvis, or leg                       (5 Points)  [ ] Prior episodes of VTE                                     (3 Points)          [ ] Acute spinal cord injury (in the previous month)  (5 Points)  [ ] Positive family history for VTE                         (3 Points)       [ ] Paralysis (less than 1 month)                          (5 Points)  [ ] Prothrombin 05575 A                                      (3 Points)         [ ] Multiple Trauma (within 1month)                 (5Points)                                                                                                                                                                [ ] Factor V Leiden                                          (3 Points)                                OTHER RISK FACTORS                          [ ] Lupus anticoagulants                                     (3 Points)                       [ ] BMI > 40                          (1 Point)                                                         [ ] Anticardiolipin antibodies                                (3 Points)                   [ ] Smoking                              (1Point)                                                [ ] High homocysteine in the blood                      (3 Points)                [  ] Diabetes requiring insulin (1point)                         [ ] Other congenital or acquired thrombophilia       (3 Points)          [  ] Chemotherapy                   (1 Point)  [ ] Heparin induced thrombocytopenia                  (3 Points)             [  ] Blood Transfusion                (1 point)                                                                                                             Total Score [    8      ]                                                                                                                                                                                                                                                                                                                                          IMPROVE Bleeding Risk Score:1.5      Time In: 8:10  Time Out: 9:30    Falls Risk:   High (x  )  Mod (  )  Low (  )      FAMILY HISTORY:  Family history of gout (Grandparent)    Family history of osteoarthritis (Mother)      Denies any personal or familial history of clotting or bleeding disorders.    Allergies    Benadryl (Other)  sulfa drugs (Rash)    Intolerances        REVIEW OF SYSTEMS    (  )Fever	     (  )Constipation	(  )SOB				(  )Headache	(  )Dysuria  (  )Chills	     (  )Melena	(  )Dyspnea present on exertion	                    (  )Dizziness                    (  )Polyuria  (  )Nausea	     (  )Hematochezia	(  )Cough			                    (  )Syncope   	(  )Hematuria  (  )Vomiting    (  )Chest Pain	(  )Wheezing			(  )Weakness  (  )Diarrhea     (  )Palpitations	(  )Anorexia			( x )joint pain    All  other review of systems negative: Yes    Vital Signs Last 24 Hrs  T(C): 36.6 (13 Aug 2021 09:37), Max: 36.6 (13 Aug 2021 09:37)  T(F): 97.9 (13 Aug 2021 09:37), Max: 97.9 (13 Aug 2021 09:37)  HR: 63 (13 Aug 2021 13:00) (53 - 64)  BP: 115/71 (13 Aug 2021 13:00) (94/83 - 145/96)  BP(mean): --  RR: 14 (13 Aug 2021 13:00) (12 - 22)  SpO2: 96% (13 Aug 2021 13:00) (95% - 100%)      PHYSICAL EXAM:    Constitutional: Appears Well    Neurological: A& O x 3    Skin: Warm    Respiratory and Thorax: normal effort; Breath sounds: normal; No rales/wheezing/rhonchi  	  Cardiovascular: S1, S2, regular, NMBR	    Gastrointestinal: BS + x 4Q, nontender	    Genitourinary:  Bladder nondistended, nontender    Musculoskeletal:   General Right:   + muscle/joint tenderness,   normal tone, no joint swelling,   ROM: limited	    General Left:   no muscle/joint tenderness,   normal tone, no joint swelling,   ROM: full    Hip:  Right: Dressing CDI;              Lower extrems:   Right: no calf tenderness              negative yoli's sign               + pedal pulses    Left:   no calf tenderness              negative yoli's sign               + pedal pulses                          11.4   5.50  )-----------( 484      ( 13 Aug 2021 09:56 )             34.7       08-13    142  |  110<H>  |  17  ----------------------------<  116<H>  4.1   |  29  |  0.89    Ca    8.6      13 Aug 2021 09:56      MEDICATIONS  (STANDING):  lactated ringers. 1000 milliLiter(s) (100 mL/Hr) IV Continuous <Continuous>  rivaroxaban 10 milliGRAM(s) Oral daily        DVT Prophylaxis:  LMWH                   (  )  Heparin SQ           (  )  Coumadin             (  )  Xarelto                  ( x )  Eliquis                   (  )  Venodynes           (x  )  Ambulation          ( x )  UFH                       (  )  Contraindicated  (  )  EC ASPIRIN       (  )

## 2021-08-13 NOTE — PROGRESS NOTE ADULT - ASSESSMENT
A: 66F s/p Right VITO     P;  posterior hip precautions   WBAT RLE   therapy   DVT ppx   post op abx  venodynes  incentive spirometer  follow labs  abduction pillow

## 2021-08-13 NOTE — CONSULT NOTE ADULT - ASSESSMENT
Patient is a 66y old  Female who presents with a chief complaint of right hip  pain now s/p Right Hip arthroplasty (13 Aug 2021 11:10). Consulted by     for VTE prophylaxis, risk stratification, and anticoagulation management. Patient is high VTE risk low bleeding risk.      D/W pt use of Xarelto, risks vs benefits and pt is in agreement to use Xarelto    Start Xarelto 10 mg po tonight and cont x 35 days till 9/17/2021, script sent to pharmacy  Protonix 40 mg po daily while on Xarelto  Daily CBC, BMP  BLE venodynes  INC mobility as lori    Thank you for the Consult, will follow

## 2021-08-13 NOTE — DISCHARGE NOTE PROVIDER - HOSPITAL COURSE
H&P:  Pt is a 66y Female    PAST MEDICAL & SURGICAL HISTORY:  Gastric bypass status for obesity  sept 2003    HTN (hypertension)    GERD (gastroesophageal reflux disease)    Osteoarthritis    Depression    Anxiety    Gallstones  h/o- ccy    Sleep apnea  awating results    CAD (coronary artery disease)    Hypothyroid    Polyp of corpus uteri    Mitral valve regurgitation    Hanny&#x27;s syndrome    Hemorrhoids    Aortic aneurysm without rupture  CT scan every year for monitoring has been stable for the past years    Diverticulosis    Congenital anomaly of aorta    Splenomegaly  mild to moderate- followed by hematologist    Anemia    Tear of lateral meniscus of right knee    HLD (hyperlipidemia)    Chronic UTI (urinary tract infection)    Insomnia    Hernia    Achilles tendinosis of left lower extremity    Peroneal tendinitis of left lower extremity    Chronic pain of right ankle    Morbid obesity    Osteopenia    History of thrombocytosis    H/O total knee replacement, right    Hx of gastric bypass  revision of gastric bypass    H/O dilation and curettage    Benign cyst of skin    H/O arthroscopy of left knee    H/O arthroscopy of right knee    H/O bilateral breast reduction surgery    H/O abdominoplasty    Acquired female bladder prolapse    S/P cholecystectomy         Now s/p Right Total Hip Arthroplasty. Pt is afebrile with stable vital signs. Pain is controlled. Alert and Oriented. Exam reveals intact EHL FHL TA GS, +DP. Dressing is clean and dry with a new bandage on.    Hospital Course:  Patient presented to [facility] medically cleared for elective Right Total Hip Replacement Surgery, having failed outpatient conservative management. Prophylactic antibiotics were started before the procedure and continued for 24 hours. They were admitted after surgery to the orthopedic floor. There were no complications during the hospital stay. All

## 2021-08-13 NOTE — DISCHARGE NOTE PROVIDER - CARE PROVIDER_API CALL
Kush Hidalgo)  Orthopaedic Surgery  [address] [place] [address] [place] [address]  Phone: [phone]  Fax: [phone]  Follow Up Time:

## 2021-08-13 NOTE — PHYSICAL THERAPY INITIAL EVALUATION ADULT - PERTINENT HX OF CURRENT PROBLEM, REHAB EVAL
67yo female with PMH of CAD, gastric bypass 2003, GERD, HTN, aortic aneurysm, depression, anxiety, essential thrombocytopenia, splenomegaly, diverticulosis, osteoporosis, OA of the right hip with progressive pain with ambulation, climbing stairs and inc pain at night, who failed the usual modalities for hip pain and is now s/p right THR.

## 2021-08-13 NOTE — DISCHARGE NOTE PROVIDER - NSDCMRMEDTOKEN_GEN_ALL_CORE_FT
amLODIPine 2.5 mg oral tablet: 1 tab(s) orally once a day  Aspir 81 oral delayed release tablet: 1 tab(s) orally once a day  biotin 5000 mcg oral tablet, disintegratin tab(s) orally once a day  Calcium 500+D oral tablet, chewable: 2 tab(s) orally once a day  Centrum oral tablet: 1 tab(s) orally once a day  diazePAM 10 mg oral tablet: 1 tab(s) orally once a day, As Needed  famotidine 40 mg oral tablet: 1 tab(s) orally once a day (at bedtime)  Flonase 50 mcg/inh nasal spray: 1 spray(s) nasal once a day  Lipitor 20 mg oral tablet: 1 tab(s) orally once a day  metoprolol succinate 50 mg oral tablet, extended release: orally 2 times a day  MiraLax oral powder for reconstitution: 17 gram(s) orally once a day, As Needed   nitrofurantoin macrocrystals 50 mg oral capsule: 1 tab(s) orally every other day (at bedtime)  oxyCODONE 5 mg oral tablet: 1 tab(s) orally every 6 hours MDD:6 tablets  pantoprazole 40 mg oral delayed release tablet: 1 tab(s) orally once a day  Protonix 40 mg oral delayed release tablet: 1 tab(s) orally once a day   quinapril 20 mg oral tablet: 1 tab(s) orally once a day  senna oral tablet: 1 tab(s) orally once a day, As Needed -for constipation   sertraline 100 mg oral tablet: orally 2 times a day  traZODone 150 mg oral tablet, extended release: orally once a day (at bedtime)  Tylenol Extra Strength 500 mg oral tablet: 2 tab(s) orally 3 times a day   Vitamin B-12: orally once a day  Vitamin C 500 mg oral tablet: 1 tab(s) orally once a day  Vitamin D3 2000 intl units oral tablet: 1 tab(s) orally once a day  ZyrTEC 10 mg oral tablet: 1 tab(s) orally once a day   amLODIPine 2.5 mg oral tablet: 1 tab(s) orally once a day  biotin 5000 mcg oral tablet, disintegratin tab(s) orally once a day  Calcium 500+D oral tablet, chewable: 2 tab(s) orally once a day  Centrum oral tablet: 1 tab(s) orally once a day  diazePAM 10 mg oral tablet: 1 tab(s) orally once a day, As Needed  famotidine 40 mg oral tablet: 1 tab(s) orally once a day (at bedtime)  Flonase 50 mcg/inh nasal spray: 1 spray(s) nasal once a day  Lipitor 20 mg oral tablet: 1 tab(s) orally once a day  metoprolol succinate 50 mg oral tablet, extended release: orally 2 times a day  MiraLax oral powder for reconstitution: 17 gram(s) orally once a day, As Needed   nitrofurantoin macrocrystals 50 mg oral capsule: 1 tab(s) orally every other day (at bedtime)  Protonix 40 mg oral delayed release tablet: 1 tab(s) orally once a day   quinapril 20 mg oral tablet: 1 tab(s) orally once a day  rivaroxaban 10 mg oral tablet: 1 tab(s) orally once a day MDD:10mg as directed by anticoagulation services  senna oral tablet: 1 tab(s) orally once a day, As Needed -for constipation   sertraline 100 mg oral tablet: orally 2 times a day  traMADol 50 mg oral tablet: 1 tab(s) orally every 6 hours, As Needed MDD:4   traZODone 150 mg oral tablet, extended release: orally once a day (at bedtime)  Tylenol Extra Strength 500 mg oral tablet: 2 tab(s) orally 3 times a day   Vitamin B-12: orally once a day  Vitamin C 500 mg oral tablet: 1 tab(s) orally once a day  Vitamin D3 2000 intl units oral tablet: 1 tab(s) orally once a day  ZyrTEC 10 mg oral tablet: 1 tab(s) orally once a day

## 2021-08-13 NOTE — PATIENT PROFILE ADULT - NSPROMEDSBROUGHTTOHOSP_GEN_A_NUR
Chief Complaint   Patient presents with   • Vaginal Discharge     vaginal discharge and itching           SUBJECTIVE:  Andressa is a 49 year old female here for vaginal discharge. Patient states she had some sensitivity at the top of her vulva about a week and half ago. She denied any pustule or vesicle. Just some tenderness and irritation. This morning she states she woke up with some vaginal discharge and odor. Patient is perimenopausal, last menses about 6 months ago. Same partner sexually.      Patient Active Problem List   Diagnosis   • IBS (irritable bowel syndrome)   • Depression   • Mild cervical dysplasia   • Colon polyp   • Menorrhagia   • Eczema   • Chronic headaches   • DUB (dysfunctional uterine bleeding)   • Family history of colon cancer          Outpatient Prescriptions Marked as Taking for the 8/24/18 encounter (Office Visit) with KEYA Menon   Medication Sig Dispense Refill   • venlafaxine XR (EFFEXOR XR) 75 MG 24 hr capsule Take 1 capsule by mouth daily. 90 capsule 3   • Lactobacillus (ACIDOPHILUS) TABS Take 1 tablet by mouth daily.            ALLERGIES:  No Known Allergies       OBJECTIVE:  Visit Vitals  /70 (BP Location: [facility], Patient Position: Sitting, Cuff Size: Large Adult)   Pulse 70   Temp 98 °F (36.7 °C) (Tympanic)   Resp 16   Ht 5' 10\" (1.778 m)   Wt 82.1 kg   BMI 25.97 kg/m²      Appears healthy, alert, and in no distress.  Skin:  Warm and dry.  : Vulva small abrasion at clitoral cummings, no surrounding redness, irritation, drainage, no vesicle. Vaginal canal with moderate discharge. Cervix appears normal.    Wet Mount positive for clue cells.    ASSESSMENT:    Andressa was seen today for vaginal discharge.    Diagnoses and all orders for this visit:    Vaginal discharge  -     WET MOUNT  -     metroNIDAZOLE (FLAGYL) 500 MG tablet; Take 1 tablet by mouth twice daily after meals.       Metronidazole twice daily as above. Warned of use with alcohol.  Expectant on abrasion. Avoid  tight clothing, follow-up if symptoms worsen.  Plan follow-up in November for routine physical as scheduled.  She is agreeable with this plan and offers no concerns or questions.      KEYA Menon PA             no

## 2021-08-14 ENCOUNTER — TRANSCRIPTION ENCOUNTER (OUTPATIENT)
Age: 66
End: 2021-08-14

## 2021-08-14 VITALS
RESPIRATION RATE: 16 BRPM | TEMPERATURE: 98 F | DIASTOLIC BLOOD PRESSURE: 68 MMHG | HEART RATE: 70 BPM | OXYGEN SATURATION: 97 % | SYSTOLIC BLOOD PRESSURE: 126 MMHG

## 2021-08-14 LAB
ANION GAP SERPL CALC-SCNC: 4 MMOL/L — LOW (ref 5–17)
BUN SERPL-MCNC: 15 MG/DL — SIGNIFICANT CHANGE UP (ref 7–23)
CALCIUM SERPL-MCNC: 8.5 MG/DL — SIGNIFICANT CHANGE UP (ref 8.5–10.1)
CHLORIDE SERPL-SCNC: 106 MMOL/L — SIGNIFICANT CHANGE UP (ref 96–108)
CO2 SERPL-SCNC: 30 MMOL/L — SIGNIFICANT CHANGE UP (ref 22–31)
COVID-19 SPIKE DOMAIN AB INTERP: POSITIVE
COVID-19 SPIKE DOMAIN ANTIBODY RESULT: 234 U/ML — HIGH
CREAT SERPL-MCNC: 0.89 MG/DL — SIGNIFICANT CHANGE UP (ref 0.5–1.3)
GLUCOSE SERPL-MCNC: 125 MG/DL — HIGH (ref 70–99)
HCT VFR BLD CALC: 29.1 % — LOW (ref 34.5–45)
HGB BLD-MCNC: 9.7 G/DL — LOW (ref 11.5–15.5)
MCHC RBC-ENTMCNC: 30.2 PG — SIGNIFICANT CHANGE UP (ref 27–34)
MCHC RBC-ENTMCNC: 33.3 GM/DL — SIGNIFICANT CHANGE UP (ref 32–36)
MCV RBC AUTO: 90.7 FL — SIGNIFICANT CHANGE UP (ref 80–100)
PLATELET # BLD AUTO: 417 K/UL — HIGH (ref 150–400)
POTASSIUM SERPL-MCNC: 3.5 MMOL/L — SIGNIFICANT CHANGE UP (ref 3.5–5.3)
POTASSIUM SERPL-SCNC: 3.5 MMOL/L — SIGNIFICANT CHANGE UP (ref 3.5–5.3)
RBC # BLD: 3.21 M/UL — LOW (ref 3.8–5.2)
RBC # FLD: 13.6 % — SIGNIFICANT CHANGE UP (ref 10.3–14.5)
SARS-COV-2 IGG+IGM SERPL QL IA: 234 U/ML — HIGH
SARS-COV-2 IGG+IGM SERPL QL IA: POSITIVE
SODIUM SERPL-SCNC: 140 MMOL/L — SIGNIFICANT CHANGE UP (ref 135–145)
WBC # BLD: 3.73 K/UL — LOW (ref 3.8–10.5)
WBC # FLD AUTO: 3.73 K/UL — LOW (ref 3.8–10.5)

## 2021-08-14 PROCEDURE — 99232 SBSQ HOSP IP/OBS MODERATE 35: CPT

## 2021-08-14 PROCEDURE — 99231 SBSQ HOSP IP/OBS SF/LOW 25: CPT

## 2021-08-14 RX ORDER — TRAMADOL HYDROCHLORIDE 50 MG/1
1 TABLET ORAL
Qty: 28 | Refills: 0
Start: 2021-08-14 | End: 2021-08-20

## 2021-08-14 RX ORDER — ASPIRIN/CALCIUM CARB/MAGNESIUM 324 MG
1 TABLET ORAL
Qty: 0 | Refills: 0 | DISCHARGE

## 2021-08-14 RX ORDER — PANTOPRAZOLE SODIUM 20 MG/1
1 TABLET, DELAYED RELEASE ORAL
Qty: 0 | Refills: 0 | DISCHARGE

## 2021-08-14 RX ADMIN — HYDROMORPHONE HYDROCHLORIDE 0.5 MILLIGRAM(S): 2 INJECTION INTRAMUSCULAR; INTRAVENOUS; SUBCUTANEOUS at 04:12

## 2021-08-14 RX ADMIN — Medication 650 MILLIGRAM(S): at 00:13

## 2021-08-14 RX ADMIN — Medication 1 TABLET(S): at 10:33

## 2021-08-14 RX ADMIN — SERTRALINE 100 MILLIGRAM(S): 25 TABLET, FILM COATED ORAL at 10:33

## 2021-08-14 RX ADMIN — Medication 100 MILLIGRAM(S): at 00:12

## 2021-08-14 RX ADMIN — Medication 15 MILLIGRAM(S): at 00:13

## 2021-08-14 RX ADMIN — Medication 650 MILLIGRAM(S): at 10:35

## 2021-08-14 RX ADMIN — Medication 15 MILLIGRAM(S): at 14:15

## 2021-08-14 RX ADMIN — Medication 650 MILLIGRAM(S): at 10:34

## 2021-08-14 RX ADMIN — SODIUM CHLORIDE 3 MILLILITER(S): 9 INJECTION INTRAMUSCULAR; INTRAVENOUS; SUBCUTANEOUS at 14:15

## 2021-08-14 RX ADMIN — Medication 500 MILLIGRAM(S): at 10:33

## 2021-08-14 RX ADMIN — HYDROMORPHONE HYDROCHLORIDE 0.5 MILLIGRAM(S): 2 INJECTION INTRAMUSCULAR; INTRAVENOUS; SUBCUTANEOUS at 03:42

## 2021-08-14 RX ADMIN — LISINOPRIL 20 MILLIGRAM(S): 2.5 TABLET ORAL at 10:33

## 2021-08-14 RX ADMIN — SODIUM CHLORIDE 3 MILLILITER(S): 9 INJECTION INTRAMUSCULAR; INTRAVENOUS; SUBCUTANEOUS at 06:03

## 2021-08-14 RX ADMIN — PANTOPRAZOLE SODIUM 40 MILLIGRAM(S): 20 TABLET, DELAYED RELEASE ORAL at 05:29

## 2021-08-14 RX ADMIN — SODIUM CHLORIDE 75 MILLILITER(S): 9 INJECTION, SOLUTION INTRAVENOUS at 00:12

## 2021-08-14 RX ADMIN — AMLODIPINE BESYLATE 2.5 MILLIGRAM(S): 2.5 TABLET ORAL at 00:13

## 2021-08-14 RX ADMIN — Medication 50 MILLIGRAM(S): at 10:33

## 2021-08-14 RX ADMIN — Medication 1 MILLIGRAM(S): at 10:33

## 2021-08-14 NOTE — DISCHARGE NOTE NURSING/CASE MANAGEMENT/SOCIAL WORK - PATIENT PORTAL LINK FT
You can access the FollowMyHealth Patient Portal offered by [facility] by registering at the following website: [email or web address]. By joining [facility]’s FollowMyHealth portal, you will also be able to view your health information using other applications (apps) compatible with our system.

## 2021-08-14 NOTE — PROGRESS NOTE ADULT - SUBJECTIVE AND OBJECTIVE BOX
Orthopedics      Patient seen and examined at bedside. Feeling well. Pain controlled. No n/v. No acute events overnight.    Vital Signs Last 24 Hrs  T(C): 36.7 (08-14-21 @ 05:38), Max: 36.9 (08-14-21 @ 00:00)  T(F): 98.1 (08-14-21 @ 05:38), Max: 98.5 (08-14-21 @ 00:00)  HR: 66 (08-14-21 @ 05:38) (53 - 67)  BP: 133/81 (08-14-21 @ 05:38) (94/83 - 136/65)  BP(mean): --  RR: 16 (08-14-21 @ 05:38) (12 - 22)  SpO2: 94% (08-14-21 @ 05:38) (94% - 100%)                        11.4   5.50  )-----------( 484      ( 13 Aug 2021 09:56 )             34.7     13 Aug 2021 09:56    142    |  110    |  17     ----------------------------<  116    4.1     |  29     |  0.89     Ca    8.6        13 Aug 2021 09:56          Exam:  Gen: NAD, resting comfortably  RLE:  Dressing c/d/i  NTTP calves b/l  +EHL/FHL/TA/GS  SILT L2-S1  Compartments soft and compressible  2+ Pulses palpable      A/P: 66yF POD 1 s/p R VITO  -FU AM labs  -Pain control  -WBAT RLE with posterior hip precautions  -DVT ppx  -Incentive Spirometry  -Elevation to extremity  -Medical management appreciated  
PCP: Dr. Garcia    CHIEF COMPLAINT:  inc right hip pain    HPI:  65yo female with PMH of HTN, HLD, gastric bypass 2003, GERD, aortic aneurysm, depression, anxiety, essential thrombocytopenia, splenomegaly, diverticulosis, osteoporosis, OA of the right hip who is admitted for right hip replacement after failing outpt conservative measures.   Hospitalist service consulted for medical comanagement.    8/14: pt seen and examined this am. Felt ok. Pain controlled. no sob/chest pain. c/o headache. Was awaiting physical therapy evaluation.  REVIEW OF SYSTEMS:   All 10 systems reviewed in detailed and found to be negative with the exception of what has already been described above    Vital Signs Last 24 Hrs  T(C): 36.9 (14 Aug 2021 08:50), Max: 36.9 (14 Aug 2021 00:00)  T(F): 98.5 (14 Aug 2021 08:50), Max: 98.5 (14 Aug 2021 00:00)  HR: 69 (14 Aug 2021 08:50) (60 - 69)  BP: 142/83 (14 Aug 2021 08:50) (109/66 - 142/83)  RR: 16 (14 Aug 2021 08:50) (16 - 16)  SpO2: 96% (14 Aug 2021 08:50) (94% - 97%)    PHYSICAL EXAM:    GENERAL: Comfortable, no acute distress   HEAD:  Normocephalic, atraumatic  EYES: EOMI, PERRLA  HEENT: Moist mucous membranes  NECK: Supple, No JVD  NERVOUS SYSTEM:  Alert & Oriented X3, non focal  CHEST/LUNG: Clear to auscultation bilaterally  HEART: Regular rate and rhythm  ABDOMEN: Soft, Nontender, Nondistended, Bowel sounds present  GENITOURINARY: Voiding, no palpable bladder  EXTREMITIES:   No clubbing, cyanosis, or edema  MUSCULOSKELETAL- Right hip dressing c/d/i  SKIN-no rash      LABS:                        9.7    3.73  )-----------( 417      ( 14 Aug 2021 06:52 )             29.1     08-14    140  |  106  |  15  ----------------------------<  125<H>  3.5   |  30  |  0.89    Ca    8.5      14 Aug 2021 06:52      MEDICATIONS  (STANDING):  acetaminophen   Tablet .. 650 milliGRAM(s) Oral every 6 hours  amLODIPine   Tablet 2.5 milliGRAM(s) Oral daily  ascorbic acid 500 milliGRAM(s) Oral two times a day  atorvastatin 20 milliGRAM(s) Oral at bedtime  cetirizine Oral Tab/Cap - Peds 10 milliGRAM(s) Oral daily  famotidine  Oral Tab/Cap - Peds 40 milliGRAM(s) Oral at bedtime  fluticasone propionate (50 MICROgram(s)/actuation) Nasal Spray - Peds 1 Spray(s) Alternating Nostrils daily  folic acid 1 milliGRAM(s) Oral daily  HYDROmorphone  Injectable 0.5 milliGRAM(s) IV Push once  ketorolac   Injectable 15 milliGRAM(s) IV Push every 6 hours  lactated ringers. 1000 milliLiter(s) (75 mL/Hr) IV Continuous <Continuous>  lisinopril 20 milliGRAM(s) Oral daily  metoprolol succinate ER 50 milliGRAM(s) Oral two times a day  multivitamin 1 Tablet(s) Oral daily  pantoprazole    Tablet 40 milliGRAM(s) Oral before breakfast  polyethylene glycol 3350 17 Gram(s) Oral at bedtime  rivaroxaban 10 milliGRAM(s) Oral daily  senna 2 Tablet(s) Oral at bedtime  sertraline 100 milliGRAM(s) Oral daily  sodium chloride 0.9% lock flush 3 milliLiter(s) IV Push every 8 hours  traZODone 150 milliGRAM(s) Oral at bedtime    MEDICATIONS  (PRN):  diazepam    Tablet 10 milliGRAM(s) Oral daily PRN anxiety  HYDROmorphone  Injectable 0.5 milliGRAM(s) IV Push every 10 minutes PRN Moderate Pain (4 - 6)  magnesium hydroxide Suspension 30 milliLiter(s) Oral daily PRN Constipation  ondansetron Injectable 8 milliGRAM(s) IV Push every 8 hours PRN Nausea and/or Vomiting  oxyCODONE    IR 5 milliGRAM(s) Oral every 3 hours PRN Moderate Pain (4 - 6)  oxyCODONE    IR 10 milliGRAM(s) Oral every 3 hours PRN Severe Pain (7 - 10)  traMADol 50 milliGRAM(s) Oral every 6 hours PRN Mild Pain (1 - 3)      IMPRESSION:  65yo female with above pmh a/w:    # OA right hip  # S/P right THR  - POD#1  -pain control  -physical therapy  -incentive spirometry  -bowel regimen.     #Acute blood loss anemia:  -d/t surgery  -no need for transfusion at this time.     # HTN  - monitor BP  - cont metoprolol, norvasc, ace-i with hold parameters    #HLD:  -statin.     #GERD/IBS  - cont PPI,    #Essential thrombocytosis:  - followed by heme outpt.  - monitor counts.    #Depression/anxiety:  -continue sertraline, diazepam, zoloft, trazodone.      # Vte prophylaxis  - xarelto per a/c services    
  HPI:      Patient is a 66y old  Female who presents with a chief complaint of right hip pain, h/o right hip OA, now s/p Right Hip arthroplasty (13 Aug 2021 11:10). Consulted by  for VTE prophylaxis, risk stratification, and anticoagulation management.      Interval History  2021:Patient seen at bedside in PACU, Discussed Xarelto and the risks and benefits with patient. Patient denies any h/o cancer, VTE, stroke or bleeding. Emphasized the importance of taking medication daily to prevent VTE. Verbalized understanding and is in agreement with treatment plan.  : seen at bedside,  without c/o, awaiting PT, reiterated use of Xarelto for VTE prevention, questions encouraged and answered        CrCl:97.4  EBL:150ml  BMI:39.3    Caprini VTE Risk Score:CAPRINI SCORE  AGE RELATED RISK FACTORS                                                       MOBILITY RELATED FACTORS  [ ] Age 41-60 years                                            (1 Point)                  [x ] Bed rest /restricted mobility                             (1 Point)  [ x] Age: 61-74 years                                           (2 Points)                [ ] Plaster cast                                                   (2 Points)  [ ] Age= 75 years                                              (3 Points)                 [ ] Bed bound for more than 72 hours                   (2 Points)    DISEASE RELATED RISK FACTORS                                               GENDER SPECIFIC FACTORS  [ ] Edema in the lower extremities                       (1 Point)           [ ] Pregnancy                                                            (1 Point)  [ ] Varicose veins                                               (1 Point)                  [ ] Post-partum < 6 weeks                                      (1 Point)             [x ] BMI > 25 Kg/m2                                            (1 Point)                  [ ] Hormonal therapy or oral contraception       (1 Point)                 [ ] Sepsis (in the previous month)                        (1 Point)             [ ] History of pregnancy complications                (1Point)  [ ] Pneumonia or serious lung disease                                             [ ] Unexplained or recurrent  (=/>3), premature                                 (In the previous month)                               (1 Point)                birth with toxemia or growth-restricted infant (1 Point)  [ ] Abnormal pulmonary function test            (1 Point)                                   SURGERY RELATED RISK FACTORS  [ ] Acute myocardial infarction                       (1 Point)                  [ ]  Section                                         (1 Point)  [ ] Congestive heart failure (in the previous month) (1 Point)   [ ] Minor surgery   lasting <45 minutes       (1 Point)   [ ] Inflammatory bowel disease                             (1 Point)          [ ] Arthroscopic surgery                                  (2 Points)  [ ] Central venous access                                    (2 Points)            [ ] General surgery lasting >45 minutes      (2 Points)       [ ] Stroke (in the previous month)                  (5 Points)            [x ] Elective major lower extremity arthroplasty (5 Points)                                   [  ] Malignancy (present or past include skin melanoma                                          but exclude  basal skin cell)    (2 points)                                      TRAUMA RELATED RISK FACTORS                HEMATOLOGY RELATED FACTORS                                  [ ] Fracture of the hip, pelvis, or leg                       (5 Points)  [ ] Prior episodes of VTE                                     (3 Points)          [ ] Acute spinal cord injury (in the previous month)  (5 Points)  [ ] Positive family history for VTE                         (3 Points)       [ ] Paralysis (less than 1 month)                          (5 Points)  [ ] Prothrombin 83544 A                                      (3 Points)         [ ] Multiple Trauma (within 1month)                 (5Points)                                                                                                                                                                [ ] Factor V Leiden                                          (3 Points)                                OTHER RISK FACTORS                          [ ] Lupus anticoagulants                                     (3 Points)                       [ ] BMI > 40                          (1 Point)                                                         [ ] Anticardiolipin antibodies                                (3 Points)                   [ ] Smoking                              (1Point)                                                [ ] High homocysteine in the blood                      (3 Points)                [  ] Diabetes requiring insulin (1point)                         [ ] Other congenital or acquired thrombophilia       (3 Points)          [  ] Chemotherapy                   (1 Point)  [ ] Heparin induced thrombocytopenia                  (3 Points)             [  ] Blood Transfusion                (1 point)                                                                                                             Total Score [    9    ]                                                                                                                                                                                                                                                                                                                                          IMPROVE Bleeding Risk Score:1.5      Time In: 8:10  Time Out: 9:30    Falls Risk:   High (x  )  Mod (  )  Low (  )        REVIEW OF SYSTEMS    (  )Fever	     (  )Constipation	(  )SOB				(  )Headache	(  )Dysuria  (  )Chills	     (  )Melena	(  )Dyspnea present on exertion	                    (  )Dizziness                    (  )Polyuria  (  )Nausea	     (  )Hematochezia	(  )Cough			                    (  )Syncope   	(  )Hematuria  (  )Vomiting    (  )Chest Pain	(  )Wheezing			(  )Weakness  (  )Diarrhea     (  )Palpitations	(  )Anorexia			( x )joint pain    All  other review of systems negative: Yes    Vital Signs Last 24 Hrs  T(C): 36.9 (21 @ 08:50), Max: 36.9 (21 @ 00:00)  T(F): 98.5 (21 @ 08:50), Max: 98.5 (21 @ 00:00)  HR: 69 (21 @ 08:50) (60 - 69)  BP: 142/83 (21 @ 08:50) (109/66 - 142/83)  BP(mean): --  RR: 16 (21 @ 08:50) ( - )  SpO2: 96% (21 @ 08:50) (94% - 97%)    PHYSICAL EXAM:    Constitutional: Appears Well    Neurological: A& O x 3    Skin: Warm    Respiratory and Thorax: normal effort; Breath sounds: normal; No rales/wheezing/rhonchi  	  Cardiovascular: S1, S2, regular, NMBR	    Gastrointestinal: BS + x 4Q, nontender	    Genitourinary:  Bladder nondistended, nontender    Musculoskeletal:   General Right:   + muscle/joint tenderness,   normal tone, no joint swelling,   ROM: limited	    General Left:   no muscle/joint tenderness,   normal tone, no joint swelling,   ROM: full    Hip:  Right: Dressing CDI; + Jenni              Lower extrems:   Right: no calf tenderness              negative yoli's sign               + pedal pulses    Left:   no calf tenderness              negative yoli's sign               + pedal pulses     labs:                                               9.7    3.73  )-----------( 417      ( 14 Aug 2021 06:52 )             29.1           140  |  106  |  15  ----------------------------<  125<H>  3.5   |  30  |  0.89    Ca    8.5      14 Aug 2021 06:52      MEDICATIONS  (STANDING):  acetaminophen   Tablet .. 650 milliGRAM(s) Oral every 6 hours  amLODIPine   Tablet 2.5 milliGRAM(s) Oral daily  ascorbic acid 500 milliGRAM(s) Oral two times a day  atorvastatin 20 milliGRAM(s) Oral at bedtime  cetirizine Oral Tab/Cap - Peds 10 milliGRAM(s) Oral daily  famotidine  Oral Tab/Cap - Peds 40 milliGRAM(s) Oral at bedtime  fluticasone propionate (50 MICROgram(s)/actuation) Nasal Spray - Peds 1 Spray(s) Alternating Nostrils daily  folic acid 1 milliGRAM(s) Oral daily  HYDROmorphone  Injectable 0.5 milliGRAM(s) IV Push once  ketorolac   Injectable 15 milliGRAM(s) IV Push every 6 hours  lactated ringers. 1000 milliLiter(s) (75 mL/Hr) IV Continuous <Continuous>  lisinopril 20 milliGRAM(s) Oral daily  metoprolol succinate ER 50 milliGRAM(s) Oral two times a day  multivitamin 1 Tablet(s) Oral daily  pantoprazole    Tablet 40 milliGRAM(s) Oral before breakfast  polyethylene glycol 3350 17 Gram(s) Oral at bedtime  rivaroxaban 10 milliGRAM(s) Oral daily  senna 2 Tablet(s) Oral at bedtime  sertraline 100 milliGRAM(s) Oral daily  sodium chloride 0.9% lock flush 3 milliLiter(s) IV Push every 8 hours  traZODone 150 milliGRAM(s) Oral at bedtime    MEDICATIONS  (PRN):  diazepam    Tablet 10 milliGRAM(s) Oral daily PRN anxiety  HYDROmorphone  Injectable 0.5 milliGRAM(s) IV Push every 10 minutes PRN Moderate Pain (4 - 6)  magnesium hydroxide Suspension 30 milliLiter(s) Oral daily PRN Constipation  ondansetron Injectable 8 milliGRAM(s) IV Push every 8 hours PRN Nausea and/or Vomiting  oxyCODONE    IR 5 milliGRAM(s) Oral every 3 hours PRN Moderate Pain (4 - 6)  oxyCODONE    IR 10 milliGRAM(s) Oral every 3 hours PRN Severe Pain (7 - 10)  traMADol 50 milliGRAM(s) Oral every 6 hours PRN Mild Pain (1 - 3)          DVT Prophylaxis:  LMWH                   (  )  Heparin SQ           (  )  Coumadin             (  )  Xarelto                  ( x )  Eliquis                   (  )  Venodynes           (x  )  Ambulation          ( x )  UFH                       (  )  Contraindicated  (  )  EC ASPIRIN       (  )          
pt seen at bedside resting in NAD, mild pain to right hip, controlled with medication, denies N/T RLE no other complaints    PE right hip   dressing c/d/i   compartments soft non tender  FROm ankle and toes  + EHL FHL GS TA   SILT   DP intact   abduction pillow intact

## 2021-08-14 NOTE — PROGRESS NOTE ADULT - ASSESSMENT
Patient is a 66y old  Female who presents with a chief complaint of right hip pain, h/o right hip OA, now s/p Right Hip arthroplasty (13 Aug 2021 11:10). Consulted by  for VTE prophylaxis, risk stratification, and anticoagulation management. Patient is high VTE risk low bleeding risk.      D/W pt use of Xarelto, risks vs benefits and pt is in agreement to use Xarelto  Acute blood loss anemia: hgb 11.4-->9.7  D/t surgery,   monitor HH  ok to Cont Xarelto 10 mg po daily x 35 days----> 9/17/2021, script sent to pharmacy, Copay $4, pt aware and is agreeable to cost  daily CBC/BMP  Protonix 40 mg po daily while on Xarelto  BLE TrueView  INC mobility as lori    dispo: home today or tomorrow depending on amount of pain and progress with PT.  Pt to have Cbc/BMP this week vis home lab, with f/u by our service, as well as weekly phone calls while on Xarelto

## 2021-08-16 ENCOUNTER — NON-APPOINTMENT (OUTPATIENT)
Age: 66
End: 2021-08-16

## 2021-08-20 DIAGNOSIS — F41.9 ANXIETY DISORDER, UNSPECIFIED: ICD-10-CM

## 2021-08-20 DIAGNOSIS — Z88.2 ALLERGY STATUS TO SULFONAMIDES: ICD-10-CM

## 2021-08-20 DIAGNOSIS — Z96.651 PRESENCE OF RIGHT ARTIFICIAL KNEE JOINT: ICD-10-CM

## 2021-08-20 DIAGNOSIS — Z87.891 PERSONAL HISTORY OF NICOTINE DEPENDENCE: ICD-10-CM

## 2021-08-20 DIAGNOSIS — E03.9 HYPOTHYROIDISM, UNSPECIFIED: ICD-10-CM

## 2021-08-20 DIAGNOSIS — F32.9 MAJOR DEPRESSIVE DISORDER, SINGLE EPISODE, UNSPECIFIED: ICD-10-CM

## 2021-08-20 DIAGNOSIS — M16.11 UNILATERAL PRIMARY OSTEOARTHRITIS, RIGHT HIP: ICD-10-CM

## 2021-08-20 DIAGNOSIS — E78.5 HYPERLIPIDEMIA, UNSPECIFIED: ICD-10-CM

## 2021-08-20 DIAGNOSIS — I25.10 ATHEROSCLEROTIC HEART DISEASE OF NATIVE CORONARY ARTERY WITHOUT ANGINA PECTORIS: ICD-10-CM

## 2021-08-20 DIAGNOSIS — K21.9 GASTRO-ESOPHAGEAL REFLUX DISEASE WITHOUT ESOPHAGITIS: ICD-10-CM

## 2021-08-20 DIAGNOSIS — Z90.49 ACQUIRED ABSENCE OF OTHER SPECIFIED PARTS OF DIGESTIVE TRACT: ICD-10-CM

## 2021-08-20 DIAGNOSIS — I10 ESSENTIAL (PRIMARY) HYPERTENSION: ICD-10-CM

## 2021-08-20 DIAGNOSIS — G47.33 OBSTRUCTIVE SLEEP APNEA (ADULT) (PEDIATRIC): ICD-10-CM

## 2021-08-20 DIAGNOSIS — Z79.82 LONG TERM (CURRENT) USE OF ASPIRIN: ICD-10-CM

## 2021-08-24 ENCOUNTER — APPOINTMENT (OUTPATIENT)
Dept: BARIATRICS/WEIGHT MGMT | Facility: CLINIC | Age: 66
End: 2021-08-24

## 2021-10-06 PROBLEM — I10 ESSENTIAL HYPERTENSION: Status: ACTIVE | Noted: 2018-03-28

## 2021-10-25 ENCOUNTER — RX RENEWAL (OUTPATIENT)
Age: 66
End: 2021-10-25

## 2021-11-29 ENCOUNTER — NON-APPOINTMENT (OUTPATIENT)
Age: 66
End: 2021-11-29

## 2021-12-22 ENCOUNTER — APPOINTMENT (OUTPATIENT)
Dept: ORTHOPEDIC SURGERY | Facility: CLINIC | Age: 66
End: 2021-12-22

## 2022-01-10 ENCOUNTER — RX RENEWAL (OUTPATIENT)
Age: 67
End: 2022-01-10

## 2022-01-21 ENCOUNTER — APPOINTMENT (OUTPATIENT)
Dept: UROLOGY | Facility: CLINIC | Age: 67
End: 2022-01-21

## 2022-01-21 ENCOUNTER — APPOINTMENT (OUTPATIENT)
Dept: UROLOGY | Facility: CLINIC | Age: 67
End: 2022-01-21
Payer: MEDICARE

## 2022-01-21 PROCEDURE — 99441: CPT | Mod: 95

## 2022-02-20 ENCOUNTER — TRANSCRIPTION ENCOUNTER (OUTPATIENT)
Age: 67
End: 2022-02-20

## 2022-04-22 ENCOUNTER — APPOINTMENT (OUTPATIENT)
Dept: INTERNAL MEDICINE | Facility: CLINIC | Age: 67
End: 2022-04-22
Payer: MEDICARE

## 2022-04-22 VITALS
HEART RATE: 66 BPM | HEIGHT: 62 IN | DIASTOLIC BLOOD PRESSURE: 70 MMHG | BODY MASS INDEX: 40.12 KG/M2 | SYSTOLIC BLOOD PRESSURE: 122 MMHG | TEMPERATURE: 98.2 F | WEIGHT: 218 LBS | RESPIRATION RATE: 16 BRPM | OXYGEN SATURATION: 95 %

## 2022-04-22 DIAGNOSIS — D47.3 ESSENTIAL (HEMORRHAGIC) THROMBOCYTHEMIA: ICD-10-CM

## 2022-04-22 PROCEDURE — 99213 OFFICE O/P EST LOW 20 MIN: CPT

## 2022-04-22 RX ORDER — B-COMPLEX WITH VITAMIN C
500-200 TABLET ORAL
Qty: 60 | Refills: 0 | Status: DISCONTINUED | COMMUNITY
Start: 2021-03-24 | End: 2022-04-22

## 2022-04-22 RX ORDER — CHROMIUM 200 MCG
1000 TABLET ORAL
Refills: 0 | Status: DISCONTINUED | COMMUNITY
End: 2022-04-22

## 2022-04-22 NOTE — DISCUSSION/SUMMARY
[FreeTextEntry1] : Ms. Salazar presents for followup evaluation. She has a history of nocturnal hypoxemia. She will repeat an overnight oximetry study. Previous polysomnography examination did not demonstrate obstructive sleep apnea however she did have nocturnal O2 desaturations. Followup as scheduled.

## 2022-04-22 NOTE — HISTORY OF PRESENT ILLNESS
[TextBox_4] : Ms. Salazar presents for followup evaluation. She has a history of mild pulmonary hypertension by echocardiogram. The patient had symptoms of significant fatigue. Polysomnography examination did not demonstrate obstructive sleep apnea, however, the polysomnography examination did demonstrate episodes of oxygen desaturation at night. The patient had an overnight oximetry study which demonstrated nocturnal hypoxemia. Patient denies any chest pain, shortness of breath or palpitations.

## 2022-04-26 ENCOUNTER — APPOINTMENT (OUTPATIENT)
Dept: DERMATOLOGY | Facility: CLINIC | Age: 67
End: 2022-04-26
Payer: MEDICARE

## 2022-04-26 DIAGNOSIS — L65.8 OTHER SPECIFIED NONSCARRING HAIR LOSS: ICD-10-CM

## 2022-04-26 DIAGNOSIS — L81.4 OTHER MELANIN HYPERPIGMENTATION: ICD-10-CM

## 2022-04-26 DIAGNOSIS — Z76.89 PERSONS ENCOUNTERING HEALTH SERVICES IN OTHER SPECIFIED CIRCUMSTANCES: ICD-10-CM

## 2022-04-26 DIAGNOSIS — Z12.83 ENCOUNTER FOR SCREENING FOR MALIGNANT NEOPLASM OF SKIN: ICD-10-CM

## 2022-04-26 DIAGNOSIS — R20.2 PARESTHESIA OF SKIN: ICD-10-CM

## 2022-04-26 PROCEDURE — 99214 OFFICE O/P EST MOD 30 MIN: CPT

## 2022-05-12 ENCOUNTER — RX RENEWAL (OUTPATIENT)
Age: 67
End: 2022-05-12

## 2022-05-12 DIAGNOSIS — R82.71 BACTERIURIA: ICD-10-CM

## 2022-07-11 ENCOUNTER — APPOINTMENT (OUTPATIENT)
Dept: ULTRASOUND IMAGING | Facility: CLINIC | Age: 67
End: 2022-07-11

## 2022-07-11 ENCOUNTER — APPOINTMENT (OUTPATIENT)
Dept: OBGYN | Facility: CLINIC | Age: 67
End: 2022-07-11

## 2022-07-11 ENCOUNTER — OUTPATIENT (OUTPATIENT)
Dept: OUTPATIENT SERVICES | Facility: HOSPITAL | Age: 67
LOS: 1 days | End: 2022-07-11
Payer: MEDICARE

## 2022-07-11 VITALS
BODY MASS INDEX: 40.48 KG/M2 | WEIGHT: 220 LBS | TEMPERATURE: 97.9 F | SYSTOLIC BLOOD PRESSURE: 120 MMHG | HEIGHT: 62 IN | DIASTOLIC BLOOD PRESSURE: 80 MMHG

## 2022-07-11 DIAGNOSIS — Z00.8 ENCOUNTER FOR OTHER GENERAL EXAMINATION: ICD-10-CM

## 2022-07-11 DIAGNOSIS — Z90.49 ACQUIRED ABSENCE OF OTHER SPECIFIED PARTS OF DIGESTIVE TRACT: Chronic | ICD-10-CM

## 2022-07-11 LAB
BILIRUB UR QL STRIP: NORMAL
CLARITY UR: CLEAR
COLLECTION METHOD: NORMAL
GLUCOSE UR-MCNC: NORMAL
HCG UR QL: 0.2 EU/DL
HGB UR QL STRIP.AUTO: NORMAL
KETONES UR-MCNC: NORMAL
LEUKOCYTE ESTERASE UR QL STRIP: NORMAL
NITRITE UR QL STRIP: NORMAL
PH UR STRIP: 5.5
PROT UR STRIP-MCNC: NORMAL
SP GR UR STRIP: 1.02

## 2022-07-11 PROCEDURE — 76830 TRANSVAGINAL US NON-OB: CPT | Mod: 26

## 2022-07-11 PROCEDURE — 99213 OFFICE O/P EST LOW 20 MIN: CPT

## 2022-07-11 PROCEDURE — 81003 URINALYSIS AUTO W/O SCOPE: CPT | Mod: QW

## 2022-07-11 PROCEDURE — 76830 TRANSVAGINAL US NON-OB: CPT

## 2022-07-11 NOTE — DISCUSSION/SUMMARY
[FreeTextEntry1] : 66 YO PT PRESENTS FOR PELVIC PAIN \par PT STATES NOTICES PELVIC PAIN X FEW MONTHS THAT COMES AND GOES MOSTLY ON THE LEFT SIDE \par LMP OVER 10 YEARS AGO- PT DENIES BLEEDING OR SPOTTING SINCE \par \par SURESWAB COLLECTED \par TRANSVAGINAL SONO ORDERED \par ALL QUESTIONS ANSWERED TO PATIENT SATISFACTION \par \par RTO FOR ANNUAL EXAM OR PRN

## 2022-07-15 LAB
A VAGINAE DNA VAG QL NAA+PROBE: NORMAL
BVAB2 DNA VAG QL NAA+PROBE: NORMAL
C KRUSEI DNA VAG QL NAA+PROBE: NEGATIVE
MEGA1 DNA VAG QL NAA+PROBE: NORMAL
T VAGINALIS RRNA SPEC QL NAA+PROBE: NEGATIVE

## 2022-07-18 ENCOUNTER — APPOINTMENT (OUTPATIENT)
Dept: COLORECTAL SURGERY | Facility: CLINIC | Age: 67
End: 2022-07-18

## 2022-07-18 VITALS
WEIGHT: 222 LBS | SYSTOLIC BLOOD PRESSURE: 128 MMHG | OXYGEN SATURATION: 91 % | RESPIRATION RATE: 14 BRPM | BODY MASS INDEX: 40.85 KG/M2 | DIASTOLIC BLOOD PRESSURE: 82 MMHG | TEMPERATURE: 96.4 F | HEART RATE: 68 BPM | HEIGHT: 62 IN

## 2022-07-18 DIAGNOSIS — Z86.03 PERSONAL HISTORY OF NEOPLASM OF UNCERTAIN BEHAVIOR: ICD-10-CM

## 2022-07-18 PROCEDURE — 46221 LIGATION OF HEMORRHOID(S): CPT

## 2022-07-18 PROCEDURE — 99204 OFFICE O/P NEW MOD 45 MIN: CPT | Mod: 25

## 2022-07-18 RX ORDER — HYDROCORTISONE ACETATE 25 MG/1
25 SUPPOSITORY RECTAL
Qty: 10 | Refills: 3 | Status: DISCONTINUED | COMMUNITY
Start: 2022-07-18 | End: 2022-07-18

## 2022-07-18 NOTE — ASSESSMENT
[FreeTextEntry1] : Ms. Salazar presents to the office for consultation secondary to prolapsing internal and external hemorrhoids. In office today, we proceeded to RBL her RP and RL column to good effect. She was advised on what to expect post procedure. Metamucil qAM was recommended to bulk up stools and allow for easier defecation. Anusol suppositories will be prescribed to decrease hemorrhoidal inflammation and swelling. Followup in 2-3 weeks for reassessment and for additional ligations.

## 2022-07-18 NOTE — PHYSICAL EXAM
[Manually Reducible] : a manually reducible (grade III) [Skin Tags] : residual hemorrhoidal skin tags were noted [Normal] : was normal [None] : there was no rectal mass  [Gross Blood] : no gross blood [No Rash or Lesion] : No rash or lesion [Alert] : alert [Oriented to Person] : oriented to person [Oriented to Place] : oriented to place [Oriented to Time] : oriented to time [Calm] : calm [de-identified] : NAD [de-identified] : NCAT [de-identified] : VICENTE x 4

## 2022-07-18 NOTE — HISTORY OF PRESENT ILLNESS
[FreeTextEntry1] : Ms. Salazar presents to the office for consultation secondary to prolapsing internal and external hemorrhoids. She notes that BMs are passed daily but of varying consistencies. She admits to straining a significant amount despite stools being soft. Last colonoscopy 5/16/2019.

## 2022-07-25 ENCOUNTER — APPOINTMENT (OUTPATIENT)
Dept: OBGYN | Facility: CLINIC | Age: 67
End: 2022-07-25

## 2022-07-25 DIAGNOSIS — R10.2 PELVIC AND PERINEAL PAIN: ICD-10-CM

## 2022-07-25 PROCEDURE — 99213 OFFICE O/P EST LOW 20 MIN: CPT

## 2022-07-25 NOTE — HISTORY OF PRESENT ILLNESS
[FreeTextEntry1] : Patient is a 67-year-old female who presents for a follow-up visit.  Patient was seen 2 weeks ago with complaints of left lower quadrant discomfort.  Patient describes as intermittent twinges patient denies any nausea vomiting diarrhea and no problems urinating moving her bowels.  Patient's recent pelvic sonogram was essentially with within normal limits.  Endometrial stripe is 3 mm.  Ovaries are normal.  There were myometrial cysts but otherwise again within normal limits.  Discussed these findings with the patient.  No obvious gynecologic pathology.  Patient to follow-up with primary care physician.  Patient to schedule follow-up gynecologic exam and Pap smear.  Instructions and precautions reviewed.\par \par 20 minutes of face time

## 2022-08-01 ENCOUNTER — APPOINTMENT (OUTPATIENT)
Dept: COLORECTAL SURGERY | Facility: CLINIC | Age: 67
End: 2022-08-01

## 2022-08-01 VITALS
HEART RATE: 70 BPM | RESPIRATION RATE: 14 BRPM | BODY MASS INDEX: 40.48 KG/M2 | HEIGHT: 62 IN | TEMPERATURE: 96.3 F | WEIGHT: 220 LBS | DIASTOLIC BLOOD PRESSURE: 83 MMHG | OXYGEN SATURATION: 93 % | SYSTOLIC BLOOD PRESSURE: 125 MMHG

## 2022-08-01 PROCEDURE — 46221 LIGATION OF HEMORRHOID(S): CPT

## 2022-08-01 PROCEDURE — 99214 OFFICE O/P EST MOD 30 MIN: CPT | Mod: 25

## 2022-08-01 NOTE — PHYSICAL EXAM
[Manually Reducible] : a manually reducible (grade III) [Skin Tags] : residual hemorrhoidal skin tags were noted [Normal] : was normal [None] : there was no rectal mass  [No Rash or Lesion] : No rash or lesion [Alert] : alert [Oriented to Person] : oriented to person [Oriented to Place] : oriented to place [Oriented to Time] : oriented to time [Calm] : calm [Gross Blood] : no gross blood [de-identified] : Full grade II in left lateral [de-identified] : NAD [de-identified] : NCAT [de-identified] : VICENTE x 4

## 2022-08-01 NOTE — ASSESSMENT
[FreeTextEntry1] : Ms. Salazar presents to the office for consultation secondary to prolapsing internal and external hemorrhoids. In office today, we proceeded to RBL her RP and RL column to good effect. She was advised on what to expect post procedure. Metamucil qAM was recommended to bulk up stools and allow for easier defecation. Anusol suppositories will be prescribed to decrease hemorrhoidal inflammation and swelling. Followup in 2-3 weeks for reassessment and for additional ligations.\par \par 8/1/22 Ms. Salazar returns to the office for additional RBL. In office today, the LL column was ligated x 2 to good effect. She was reminded on what to expect post procedure and can return to office in 2 - 3 weeks for additional ligations.

## 2022-08-01 NOTE — HISTORY OF PRESENT ILLNESS
[FreeTextEntry1] : Ms. Salazar presents to the office for consultation secondary to prolapsing internal and external hemorrhoids. She notes that BMs are passed daily but of varying consistencies. She admits to straining a significant amount despite stools being soft. Last colonoscopy 5/16/2019.\par \par 8/1/22 Ms Salazar returns to the office for followup. No complaints since last office appt in which she underwent RBL. Continues to have issues with prolapse.

## 2022-08-24 ENCOUNTER — APPOINTMENT (OUTPATIENT)
Dept: OBGYN | Facility: CLINIC | Age: 67
End: 2022-08-24

## 2022-08-31 ENCOUNTER — NON-APPOINTMENT (OUTPATIENT)
Age: 67
End: 2022-08-31

## 2022-08-31 ENCOUNTER — APPOINTMENT (OUTPATIENT)
Dept: COLORECTAL SURGERY | Facility: CLINIC | Age: 67
End: 2022-08-31

## 2022-08-31 VITALS
RESPIRATION RATE: 14 BRPM | HEART RATE: 62 BPM | SYSTOLIC BLOOD PRESSURE: 110 MMHG | BODY MASS INDEX: 40.3 KG/M2 | DIASTOLIC BLOOD PRESSURE: 70 MMHG | WEIGHT: 219 LBS | HEIGHT: 62 IN | TEMPERATURE: 97.6 F | OXYGEN SATURATION: 96 %

## 2022-08-31 PROCEDURE — 46221 LIGATION OF HEMORRHOID(S): CPT

## 2022-08-31 PROCEDURE — 99214 OFFICE O/P EST MOD 30 MIN: CPT | Mod: 25

## 2022-08-31 NOTE — ASSESSMENT
[FreeTextEntry1] : Ms. Salazar presents to the office for consultation secondary to prolapsing internal and external hemorrhoids. In office today, we proceeded to RBL her RP and RL column to good effect. She was advised on what to expect post procedure. Metamucil qAM was recommended to bulk up stools and allow for easier defecation. Anusol suppositories will be prescribed to decrease hemorrhoidal inflammation and swelling. Followup in 2-3 weeks for reassessment and for additional ligations.\par \par 8/1/22 Ms. Salazar returns to the office for additional RBL. In office today, the LL column was ligated x 2 to good effect. She was reminded on what to expect post procedure and can return to office in 2 - 3 weeks for additional ligations.\par \par 8/31/22 Ms Salazar returns to the office for rubber band ligation of now her prolapsing anterior column.  In office today, at bedside, the anterior column was ligated twice to good effect.  She is now undergone rubber band ligation of all 3 columns.  She was advised to follow-up as needed for recurrent hemorrhoidal issues.

## 2022-08-31 NOTE — PROCEDURE
[FreeTextEntry1] : RBL to anterior column x 2\par  Procedure:\par The risks and benefits of the rubber band ligation were discussed with the patient. This included but was not limited to bleeding and sepsis the feeling of fullness in the rectum and the anticipated bleeding when the band falls off in 5-7 days.The hemorrhoid was grasped with the hemorrhoidal grasper. There was no sensation. A standard rubber band was applied to this hemorrhoid. The patient tolerated the procedure well without any complications.\par \par

## 2022-08-31 NOTE — PHYSICAL EXAM
[Manually Reducible] : a manually reducible (grade III) [Skin Tags] : residual hemorrhoidal skin tags were noted [Normal] : was normal [None] : there was no rectal mass  [Gross Blood] : no gross blood [No Rash or Lesion] : No rash or lesion [Alert] : alert [Oriented to Person] : oriented to person [Oriented to Place] : oriented to place [Oriented to Time] : oriented to time [Calm] : calm [de-identified] :  anterior [de-identified] : NAD [de-identified] : NCAT [de-identified] : VICENTE x 4

## 2022-08-31 NOTE — HISTORY OF PRESENT ILLNESS
[FreeTextEntry1] : Ms. Salazar presents to the office for consultation secondary to prolapsing internal and external hemorrhoids. She notes that BMs are passed daily but of varying consistencies. She admits to straining a significant amount despite stools being soft. Last colonoscopy 5/16/2019.\par \par 8/1/22 Ms Salazar returns to the office for followup. No complaints since last office appt in which she underwent RBL. Continues to have issues with prolapse.\par \par 8/31/22 Ms. Salazar returns to the office for follow-up.  Since her last appointment, she reports that approximately 1-1/2 weeks ago, she noted another internal hemorrhoidal prolapse.

## 2022-10-11 ENCOUNTER — RX RENEWAL (OUTPATIENT)
Age: 67
End: 2022-10-11

## 2022-10-14 ENCOUNTER — RX RENEWAL (OUTPATIENT)
Age: 67
End: 2022-10-14

## 2022-10-24 ENCOUNTER — NON-APPOINTMENT (OUTPATIENT)
Age: 67
End: 2022-10-24

## 2022-10-25 ENCOUNTER — APPOINTMENT (OUTPATIENT)
Dept: GASTROENTEROLOGY | Facility: CLINIC | Age: 67
End: 2022-10-25

## 2022-10-25 VITALS
SYSTOLIC BLOOD PRESSURE: 120 MMHG | BODY MASS INDEX: 40.67 KG/M2 | HEIGHT: 62 IN | DIASTOLIC BLOOD PRESSURE: 82 MMHG | HEART RATE: 72 BPM | WEIGHT: 221 LBS

## 2022-10-25 DIAGNOSIS — R09.89 OTHER SPECIFIED SYMPTOMS AND SIGNS INVOLVING THE CIRCULATORY AND RESPIRATORY SYSTEMS: ICD-10-CM

## 2022-10-25 DIAGNOSIS — K21.9 GASTRO-ESOPHAGEAL REFLUX DISEASE W/OUT ESOPHAGITIS: ICD-10-CM

## 2022-10-25 DIAGNOSIS — Z98.84 BARIATRIC SURGERY STATUS: ICD-10-CM

## 2022-10-25 PROCEDURE — 99213 OFFICE O/P EST LOW 20 MIN: CPT

## 2022-10-26 PROBLEM — Z98.84 HISTORY OF GASTRIC BYPASS: Status: ACTIVE | Noted: 2018-03-28

## 2022-10-26 PROBLEM — K21.9 GERD (GASTROESOPHAGEAL REFLUX DISEASE): Status: ACTIVE | Noted: 2018-03-28

## 2022-10-26 PROBLEM — R09.89 GLOBUS SENSATION: Status: ACTIVE | Noted: 2022-10-26

## 2022-10-26 RX ORDER — FAMOTIDINE 40 MG/1
40 TABLET, FILM COATED ORAL
Qty: 90 | Refills: 3 | Status: ACTIVE | COMMUNITY
Start: 2020-07-22

## 2022-10-30 NOTE — HISTORY OF PRESENT ILLNESS
[FreeTextEntry1] : Viktoriya Salazar is a 67 year old female presenting today for follow up visit for GERD. Notes she has had longstanding chronic GERD however over the last few weeks her symptoms have been persistent nightly when she lays down to go to bed despite chronic omeprazole and famotidine use. Reports globus sensation. Denies any changes to her diet, does her best to avoid very acidic foods. Denies any changes to her other medical history. Denies changes to her bowel habits, has daily bowel movements without straining or overt signs of bleeding such as melena or hematochezia.

## 2022-10-30 NOTE — REVIEW OF SYSTEMS
[As Noted in HPI] : as noted in HPI [Abdominal Pain] : abdominal pain [Heartburn] : heartburn [Negative] : Heme/Lymph [Vomiting] : no vomiting [Constipation] : no constipation [Diarrhea] : no diarrhea [Melena (black stool)] : no melena [Bleeding] : no bleeding [Fecal Incontinence (soiling)] : no fecal incontinence [Bloating (gassiness)] : no bloating

## 2022-10-30 NOTE — ASSESSMENT
[FreeTextEntry1] : Plan:\par Since pt reports persistent Globus sensation and GERD despite medications, will perform EGD to rule out PUD or BE. Also recommend sucralfate three times daily for symptom management., pt thinks she was on this before and it alleviated her symptoms. Risks versus benefits as well as instructions reviewed, pt agrees. All questions answered, pt expressed understanding. Discussed with Dr. Can. I have spent 20 minutes on this encounter.

## 2022-11-04 ENCOUNTER — APPOINTMENT (OUTPATIENT)
Dept: INTERNAL MEDICINE | Facility: CLINIC | Age: 67
End: 2022-11-04

## 2022-11-04 ENCOUNTER — NON-APPOINTMENT (OUTPATIENT)
Age: 67
End: 2022-11-04

## 2022-11-04 ENCOUNTER — APPOINTMENT (OUTPATIENT)
Dept: GASTROENTEROLOGY | Facility: CLINIC | Age: 67
End: 2022-11-04

## 2022-11-04 VITALS
SYSTOLIC BLOOD PRESSURE: 110 MMHG | HEART RATE: 72 BPM | OXYGEN SATURATION: 97 % | WEIGHT: 216 LBS | DIASTOLIC BLOOD PRESSURE: 64 MMHG | TEMPERATURE: 98.9 F | BODY MASS INDEX: 39.75 KG/M2 | HEIGHT: 62 IN

## 2022-11-04 DIAGNOSIS — I27.20 PULMONARY HYPERTENSION, UNSPECIFIED: ICD-10-CM

## 2022-11-04 DIAGNOSIS — Z87.891 PERSONAL HISTORY OF NICOTINE DEPENDENCE: ICD-10-CM

## 2022-11-04 PROCEDURE — 94010 BREATHING CAPACITY TEST: CPT

## 2022-11-04 PROCEDURE — 99214 OFFICE O/P EST MOD 30 MIN: CPT | Mod: 25

## 2022-11-04 RX ORDER — NITROFURANTOIN (MONOHYDRATE/MACROCRYSTALS) 25; 75 MG/1; MG/1
100 CAPSULE ORAL
Qty: 45 | Refills: 3 | Status: DISCONTINUED | COMMUNITY
Start: 2021-11-29 | End: 2022-11-04

## 2022-11-04 RX ORDER — TRIAMCINOLONE ACETONIDE 1 MG/G
0.1 CREAM TOPICAL
Qty: 1 | Refills: 0 | Status: DISCONTINUED | COMMUNITY
Start: 2022-04-26 | End: 2022-11-04

## 2022-11-04 RX ORDER — HYDROCORTISONE 25 MG/G
2.5 CREAM TOPICAL
Qty: 30 | Refills: 3 | Status: DISCONTINUED | COMMUNITY
Start: 2022-07-18 | End: 2022-11-04

## 2022-11-04 NOTE — HISTORY OF PRESENT ILLNESS
[TextBox_4] : Ms. Salazar presents for follow-up evaluation.  She is feeling well.  She continues to have some shortness of breath with exertion.  Patient had a previous history of abnormal breathing patterns at night.  She had a home polysomnography examination followed by an in lab polysomnography examination which did not demonstrate any evidence of obstructive sleep apnea.  She did have nocturnal hypoxemia.  Ms. Salazar did have evidence of nocturnal hypoxemia and has an O2 concentrator at home.  She does not use it at night. Echocardiogram from 4/14/2022 by Dr. Leone shows mild to moderate pulmonary hypertension.\par Patient had a previous high-resolution CT scan of the chest which showed no evidence of interstitial lung disease.  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  She has a mildly dilated main pulmonary artery.

## 2022-11-04 NOTE — REASON FOR VISIT
[Follow-Up] : a follow-up visit [Pulmonary Hypertension] : pulmonary hypertension [Shortness of Breath] : shortness of breath

## 2022-11-04 NOTE — PROCEDURE
[FreeTextEntry1] : Spirometry shows mild obstructive lung disease.  FEV1 is 1.52 L which is 72% predicted.  FVC is 2.19 L which is 78% predicted.  FEV1/FVC ratio is 70%.

## 2022-11-18 ENCOUNTER — APPOINTMENT (OUTPATIENT)
Dept: OBGYN | Facility: CLINIC | Age: 67
End: 2022-11-18

## 2022-11-18 VITALS
HEIGHT: 62 IN | DIASTOLIC BLOOD PRESSURE: 70 MMHG | SYSTOLIC BLOOD PRESSURE: 118 MMHG | BODY MASS INDEX: 39.75 KG/M2 | RESPIRATION RATE: 16 BRPM | WEIGHT: 216 LBS

## 2022-11-18 DIAGNOSIS — Z01.419 ENCOUNTER FOR GYNECOLOGICAL EXAMINATION (GENERAL) (ROUTINE) W/OUT ABNORMAL FINDINGS: ICD-10-CM

## 2022-11-18 DIAGNOSIS — M85.80 OTHER SPECIFIED DISORDERS OF BONE DENSITY AND STRUCTURE, UNSPECIFIED SITE: ICD-10-CM

## 2022-11-18 DIAGNOSIS — N95.2 POSTMENOPAUSAL ATROPHIC VAGINITIS: ICD-10-CM

## 2022-11-18 DIAGNOSIS — Z86.39 PERSONAL HISTORY OF OTHER ENDOCRINE, NUTRITIONAL AND METABOLIC DISEASE: ICD-10-CM

## 2022-11-18 DIAGNOSIS — Z87.39 PERSONAL HISTORY OF OTHER DISEASES OF THE MUSCULOSKELETAL SYSTEM AND CONNECTIVE TISSUE: ICD-10-CM

## 2022-11-18 PROCEDURE — 82270 OCCULT BLOOD FECES: CPT

## 2022-11-18 PROCEDURE — G0101: CPT

## 2022-11-19 LAB — CYTOLOGY CVX/VAG DOC THIN PREP: NORMAL

## 2022-11-21 LAB — HPV HIGH+LOW RISK DNA PNL CVX: NOT DETECTED

## 2022-11-28 LAB — CYTOLOGY CVX/VAG DOC THIN PREP: NORMAL

## 2023-01-17 ENCOUNTER — RX RENEWAL (OUTPATIENT)
Age: 68
End: 2023-01-17

## 2023-01-27 ENCOUNTER — LABORATORY RESULT (OUTPATIENT)
Age: 68
End: 2023-01-27

## 2023-01-31 ENCOUNTER — APPOINTMENT (OUTPATIENT)
Dept: GASTROENTEROLOGY | Facility: AMBULATORY MEDICAL SERVICES | Age: 68
End: 2023-01-31
Payer: MEDICARE

## 2023-01-31 ENCOUNTER — RESULT REVIEW (OUTPATIENT)
Age: 68
End: 2023-01-31

## 2023-01-31 PROCEDURE — 43239 EGD BIOPSY SINGLE/MULTIPLE: CPT

## 2023-02-13 ENCOUNTER — RX RENEWAL (OUTPATIENT)
Age: 68
End: 2023-02-13

## 2023-02-13 RX ORDER — SUCRALFATE 1 G/1
1 TABLET ORAL
Qty: 270 | Refills: 0 | Status: ACTIVE | COMMUNITY
Start: 2022-10-25 | End: 1900-01-01

## 2023-02-18 ENCOUNTER — EMERGENCY (EMERGENCY)
Facility: HOSPITAL | Age: 68
LOS: 0 days | Discharge: HOME | End: 2023-02-18
Attending: EMERGENCY MEDICINE
Payer: MEDICARE

## 2023-02-18 VITALS
RESPIRATION RATE: 18 BRPM | TEMPERATURE: 98 F | OXYGEN SATURATION: 100 % | SYSTOLIC BLOOD PRESSURE: 126 MMHG | HEART RATE: 68 BPM | DIASTOLIC BLOOD PRESSURE: 88 MMHG

## 2023-02-18 VITALS — HEIGHT: 63 IN | WEIGHT: 220.02 LBS

## 2023-02-18 DIAGNOSIS — G90.2 HORNER'S SYNDROME: ICD-10-CM

## 2023-02-18 DIAGNOSIS — Z90.49 ACQUIRED ABSENCE OF OTHER SPECIFIED PARTS OF DIGESTIVE TRACT: ICD-10-CM

## 2023-02-18 DIAGNOSIS — D47.3 ESSENTIAL (HEMORRHAGIC) THROMBOCYTHEMIA: ICD-10-CM

## 2023-02-18 DIAGNOSIS — Z96.651 PRESENCE OF RIGHT ARTIFICIAL KNEE JOINT: ICD-10-CM

## 2023-02-18 DIAGNOSIS — K57.90 DIVERTICULOSIS OF INTESTINE, PART UNSPECIFIED, WITHOUT PERFORATION OR ABSCESS WITHOUT BLEEDING: ICD-10-CM

## 2023-02-18 DIAGNOSIS — F41.9 ANXIETY DISORDER, UNSPECIFIED: ICD-10-CM

## 2023-02-18 DIAGNOSIS — I25.10 ATHEROSCLEROTIC HEART DISEASE OF NATIVE CORONARY ARTERY WITHOUT ANGINA PECTORIS: ICD-10-CM

## 2023-02-18 DIAGNOSIS — K64.9 UNSPECIFIED HEMORRHOIDS: ICD-10-CM

## 2023-02-18 DIAGNOSIS — R16.1 SPLENOMEGALY, NOT ELSEWHERE CLASSIFIED: ICD-10-CM

## 2023-02-18 DIAGNOSIS — Z88.2 ALLERGY STATUS TO SULFONAMIDES: ICD-10-CM

## 2023-02-18 DIAGNOSIS — I10 ESSENTIAL (PRIMARY) HYPERTENSION: ICD-10-CM

## 2023-02-18 DIAGNOSIS — Z98.84 BARIATRIC SURGERY STATUS: ICD-10-CM

## 2023-02-18 DIAGNOSIS — R22.0 LOCALIZED SWELLING, MASS AND LUMP, HEAD: ICD-10-CM

## 2023-02-18 DIAGNOSIS — Z88.8 ALLERGY STATUS TO OTHER DRUGS, MEDICAMENTS AND BIOLOGICAL SUBSTANCES STATUS: ICD-10-CM

## 2023-02-18 DIAGNOSIS — Z90.49 ACQUIRED ABSENCE OF OTHER SPECIFIED PARTS OF DIGESTIVE TRACT: Chronic | ICD-10-CM

## 2023-02-18 DIAGNOSIS — G47.00 INSOMNIA, UNSPECIFIED: ICD-10-CM

## 2023-02-18 DIAGNOSIS — Z79.01 LONG TERM (CURRENT) USE OF ANTICOAGULANTS: ICD-10-CM

## 2023-02-18 DIAGNOSIS — F32.A DEPRESSION, UNSPECIFIED: ICD-10-CM

## 2023-02-18 DIAGNOSIS — K21.9 GASTRO-ESOPHAGEAL REFLUX DISEASE WITHOUT ESOPHAGITIS: ICD-10-CM

## 2023-02-18 DIAGNOSIS — M85.80 OTHER SPECIFIED DISORDERS OF BONE DENSITY AND STRUCTURE, UNSPECIFIED SITE: ICD-10-CM

## 2023-02-18 DIAGNOSIS — L53.9 ERYTHEMATOUS CONDITION, UNSPECIFIED: ICD-10-CM

## 2023-02-18 DIAGNOSIS — E78.5 HYPERLIPIDEMIA, UNSPECIFIED: ICD-10-CM

## 2023-02-18 DIAGNOSIS — D64.9 ANEMIA, UNSPECIFIED: ICD-10-CM

## 2023-02-18 DIAGNOSIS — L29.9 PRURITUS, UNSPECIFIED: ICD-10-CM

## 2023-02-18 DIAGNOSIS — E03.9 HYPOTHYROIDISM, UNSPECIFIED: ICD-10-CM

## 2023-02-18 LAB
HCT VFR BLD CALC: 37.1 % — SIGNIFICANT CHANGE UP (ref 34.5–45)
HGB BLD-MCNC: 12.3 G/DL — SIGNIFICANT CHANGE UP (ref 11.5–15.5)
MCHC RBC-ENTMCNC: 30.8 PG — SIGNIFICANT CHANGE UP (ref 27–34)
MCHC RBC-ENTMCNC: 33.2 GM/DL — SIGNIFICANT CHANGE UP (ref 32–36)
MCV RBC AUTO: 92.8 FL — SIGNIFICANT CHANGE UP (ref 80–100)
PLATELET # BLD AUTO: 564 K/UL — HIGH (ref 150–400)
RBC # BLD: 4 M/UL — SIGNIFICANT CHANGE UP (ref 3.8–5.2)
RBC # FLD: 14.7 % — HIGH (ref 10.3–14.5)
WBC # BLD: 8.06 K/UL — SIGNIFICANT CHANGE UP (ref 3.8–10.5)
WBC # FLD AUTO: 8.06 K/UL — SIGNIFICANT CHANGE UP (ref 3.8–10.5)

## 2023-02-18 PROCEDURE — 93010 ELECTROCARDIOGRAM REPORT: CPT

## 2023-02-18 PROCEDURE — 99284 EMERGENCY DEPT VISIT MOD MDM: CPT

## 2023-02-18 PROCEDURE — 85027 COMPLETE CBC AUTOMATED: CPT

## 2023-02-18 PROCEDURE — 36415 COLL VENOUS BLD VENIPUNCTURE: CPT

## 2023-02-18 PROCEDURE — 93005 ELECTROCARDIOGRAM TRACING: CPT

## 2023-02-18 NOTE — ED PROVIDER NOTE - NSFOLLOWUPINSTRUCTIONS_ED_ALL_ED_FT
PLEASE FOLLOW UP WITH YOUR RHEUMATOLOGIST ABOUT YOUR HIGH PLATELETS, THIS COULD BE CAUSING YOUR SYMPTOMS        Essential Thrombocythemia    Blood cells moving through a normal blood vessel, with a close-up of platelets.    Essential thrombocythemia is a condition in which a person has too many platelets (thrombocytes) in the blood. Platelets are parts of blood that stick together and form a clot (thrombus) to help the body stop bleeding after an injury. This condition may also be called primary or essential thrombocytosis.    Essential thrombocythemia happens when abnormal cells in the bone marrow (megakaryocytes) make too many platelets.      What are the causes?    The cause of this condition is not known.      What are the signs or symptoms?    This condition may not cause any symptoms. If you have symptoms, they may include:  •Blood clots.      •Weakness.      •Headache.      •Itching.      •Sweating or fever.      •Dizziness or confusion.      •Tingling or burning in your hands or feet.      Symptoms may also include bleeding and an enlarged spleen.      How is this diagnosed?    This condition may be diagnosed based on:  •A physical exam.      •Your symptoms.      •Your medical history.      •Blood tests.      •A procedure to collect a sample of your bone marrow (bone marrow aspiration) for testing.        How is this treated?    If you do not have symptoms, you may not need treatment. Your health care provider may monitor your condition with regular blood tests.    If you have symptoms, or if your platelet count is very high, you may be treated with:  •Aspirin or other medicines to thin your blood and help prevent blood clots.      •Medicines to reduce the number of platelets in your blood.    •A procedure to remove some platelets from your blood (plateletpheresis). During this procedure:  •Your health care provider will place an IV into one of your veins.      •The IV will be used to draw your blood into a machine that separates out the extra platelets.      •The blood with reduced platelets will be returned to your body.          Follow these instructions at home:    Medicines     •Take over-the-counter and prescription medicines only as told by your health care provider.    •If you are taking blood thinners:   •Talk with your health care provider before you take any medicines that contain aspirin or NSAIDs, such as ibuprofen. These medicines increase your risk for dangerous bleeding.      •Take your medicine exactly as told, at the same time every day.       •Avoid activities that could cause injury or bruising, and follow instructions about how to prevent falls.       •Wear a medical alert bracelet or carry a card that lists what medicines you take.        •Tell all health care providers, including dental health care providers, about any medicines you are taking to prevent blood clots.      General instructions     • Do not use any products that contain nicotine or tobacco. These products include cigarettes, chewing tobacco, and vaping devices, such as e-cigarettes. If you need help quitting, ask your health care provider.      •Ask your health care provider about managing or preventing high cholesterol, high blood pressure, and diabetes. These conditions can make essential thrombocythemia worse.      •Keep all follow-up visits. This is important.        Contact a health care provider if:    •You have severe pain, and medicines do not help.      •You have problems taking your medicines to prevent blood clots.      •You have new pain, warmth, swelling, or redness in an arm or leg. This could mean you have a blood clot.      •You faint.      •You have unusual bruises.      •You have bloody or tarry stools.      •You have pink or bloody urine.      •Your menstrual periods are heavier than normal, if applicable.      •You have nosebleeds, bleeding gums, or other bleeding.        Get help right away if:  A sign showing the "BE FAST" categories for the warning signs of a stroke: balance, eyes, face, arms, speech, and time.    •You have chest pain.      •You have trouble breathing.    •You have any symptoms of a stroke. "BE FAST" is an easy way to remember the main warning signs of a stroke:  •B - Balance. Signs are dizziness, sudden trouble walking, or loss of balance.      •E - Eyes. Signs are trouble seeing or a sudden change in vision.      •F - Face. Signs are sudden weakness or numbness of the face, or the face or eyelid drooping on one side.      •A - Arm. Signs are weakness or numbness in an arm. This happens suddenly and usually on one side of the body.      •S - Speech. Signs are sudden trouble speaking, slurred speech, or trouble understanding what people say.      •T - Time. Time to call emergency services. Write down what time symptoms started.      •You have other signs of a stroke, such as:   •A sudden, severe headache with no known cause.      • Nausea or vomiting.      • Seizure.        These symptoms may represent a serious problem that is an emergency. Do not wait to see if the symptoms will go away. Get medical help right away. Call your local emergency services (911 in the [place] Do not drive yourself to the hospital.       Summary    •Essential thrombocythemia happens when abnormal cells in the bone marrow make too many platelets.      •If you have symptoms, or if your platelet count is very high, you may need treatment.      •Treatment can vary and may include medicines to thin the blood and prevent blood clots.      •Ask your health care provider about how to manage or prevent high cholesterol, high blood pressure, and diabetes. All of these can make essential thrombocythemia worse.      •Get help right away if you have any symptoms of stroke.      This information is not intended to replace advice given to you by your health care provider. Make sure you discuss any questions you have with your health care provider.      Document Revised: 06/20/2022 Document Reviewed: 06/20/2022    Elsevier Patient Education © 2022 [employer]

## 2023-02-18 NOTE — ED PROVIDER NOTE - NS ED ROS FT
Constitutional: No fever or chills  Eyes: No visual changes  HEENT: No throat pain  CV: No chest pain  Resp: No SOB no cough  GI: No abd pain, nausea or vomiting  : No dysuria  MSK: No musculoskeletal pain  Skin: +facial swelling, +palm switching  Neuro: No headache

## 2023-02-18 NOTE — ED PROVIDER NOTE - CLINICAL SUMMARY MEDICAL DECISION MAKING FREE TEXT BOX
68 yo female with PMHx of anemia, anxiety, aortic aneurysm, CAD, chronic UTI, depression, diverticulosis, gallstones s/p cholecystectomy, GERD, s/p gastric bypass, hemorrhoids, thrombocytosis, HLD, Hanny's syndrome, HTN, hypothyroid, morbid obesity, mitral valve regurgitation, osteoarthritis, osteopenia, splenomegaly presents to the ED with itchy palms,  mild swelling of face. Consider allergic rxn. Pt is speaking in full sentences. No need for immediate intervention. Told pt we will send over steroids and face swelling continues. Pt allergic to benadryl but does take pepcid. Check cbc at pt's request as she has h/o thrombocytopenia and reassess closely.

## 2023-02-18 NOTE — ED ADULT TRIAGE NOTE - CHIEF COMPLAINT QUOTE
Pt presents to ER c/o labored breathing, itchy palms and swelling in face. Onset of symptoms began last night with itchy palms. Normal breathing pattern, airway patent

## 2023-02-18 NOTE — ED PROVIDER NOTE - PATIENT PORTAL LINK FT
You can access the FollowMyHealth Patient Portal offered by [facility] by registering at the following website: [email or web address]. By joining [facility]’s FollowMyHealth portal, you will also be able to view your health information using other applications (apps) compatible with our system.

## 2023-02-18 NOTE — ED PROVIDER NOTE - CARE PROVIDER_API CALL
Mack Cohen)  Hematology; Internal Medicine; Medical Oncology  [address] [place] [id number]  Phone: [phone]  Fax: [phone]  Follow Up Time: 4-6 Days

## 2023-02-18 NOTE — ED PROVIDER NOTE - OBJECTIVE STATEMENT
66 yo female with PMHx of anemia, anxiety, aortic aneurysm, CAD, chronic UTI, depression, diverticulosis, gallstones s/p cholecystectomy, GERD, s/p gastric bypass, hemorrhoids, thrombocytosis, HLD, Hanny's syndrome, HTN, hypothyroid, morbid obesity, mitral valve regurgitation, osteoarthritis, osteopenia, splenomegaly, presents to the ED reporting that last night she noted itchy palms and some swelling and redness of her cheeks. States that she does get anxious and said she felt SOB which self resolved. Denies throat or tongue swelling. 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  No progression of face swelling since onset. Also reports that the palm itching has resolved. No new changes in meds other than starting Lisinopril.

## 2023-02-18 NOTE — ED PROVIDER NOTE - PHYSICAL EXAMINATION
Constitutional: NAD AAOx3  Eyes: PERRLA EOMI  Head: Normocephalic atraumatic  Mouth: MMM  Cardiac: regular rate   Resp: Lungs CTAB  GI: Abd s/nt/nd, no rebound or guarding.  Neuro: awake, alert, moving all extremities, cranial nerves 2-12 intact, sensation intact, no dysmetria.  Skin:  mildly erythematous cheeks, minimally swollen. uvula is midline, no swelling of posterior throat.  no signs of scabies or allergic rxn on palms.

## 2023-02-18 NOTE — ED ADULT NURSE REASSESSMENT NOTE - NS ED NURSE REASSESS COMMENT FT1
Received pt from prior RN, pt is a&0x4, airway patent, does not show any s/s of respiratory distress, breathing is unlabored, color is culturally appropriate, vs are WNL, cardiac monitor reads SR, HR is 66bpm, %, denies chest pain and SOB at this time, pt ambulated to the bathroom without any difficulties

## 2023-02-18 NOTE — ED PROVIDER NOTE - PROGRESS NOTE DETAILS
ED evaluation and management discussed with the patient and family (if available) in detail.  Close PMD follow up encouraged.  Strict ED return instructions discussed in detail and patient given the opportunity to ask any questions about their discharge diagnosis and instructions. Patient verbalized understanding.    no need for steroids, discussed with pt

## 2023-02-22 ENCOUNTER — APPOINTMENT (OUTPATIENT)
Dept: INTERNAL MEDICINE | Facility: CLINIC | Age: 68
End: 2023-02-22
Payer: MEDICARE

## 2023-02-22 ENCOUNTER — NON-APPOINTMENT (OUTPATIENT)
Age: 68
End: 2023-02-22

## 2023-02-22 VITALS
TEMPERATURE: 98 F | SYSTOLIC BLOOD PRESSURE: 110 MMHG | RESPIRATION RATE: 16 BRPM | HEART RATE: 69 BPM | HEIGHT: 62 IN | BODY MASS INDEX: 40.48 KG/M2 | WEIGHT: 220 LBS | DIASTOLIC BLOOD PRESSURE: 70 MMHG | OXYGEN SATURATION: 97 %

## 2023-02-22 DIAGNOSIS — G47.34 IDIOPATHIC SLEEP RELATED NONOBSTRUCTIVE ALVEOLAR HYPOVENTILATION: ICD-10-CM

## 2023-02-22 DIAGNOSIS — R06.00 DYSPNEA, UNSPECIFIED: ICD-10-CM

## 2023-02-22 PROCEDURE — 94060 EVALUATION OF WHEEZING: CPT

## 2023-02-22 PROCEDURE — 99213 OFFICE O/P EST LOW 20 MIN: CPT | Mod: 25

## 2023-02-22 NOTE — REVIEW OF SYSTEMS
[Fever] : no fever [Chills] : no chills [Fatigue] : fatigue [Hot Flashes] : no hot flashes [Night Sweats] : no night sweats [Recent Change In Weight] : ~T no recent weight change [Discharge] : no discharge [Pain] : no pain [Redness] : no redness [Dryness] : no dryness  [Earache] : no earache [Hearing Loss] : no hearing loss [Nasal Discharge] : no nasal discharge [Sore Throat] : no sore throat [Postnasal Drip] : no postnasal drip [Chest Pain] : no chest pain [Palpitations] : no palpitations [Leg Claudication] : no leg claudication [Lower Ext Edema] : no lower extremity edema [Orthopnea] : no orthopnea [Paroxysmal Nocturnal Dyspnea] : no paroxysmal nocturnal dyspnea [Shortness Of Breath] : shortness of breath [Wheezing] : no wheezing [Cough] : no cough [Dyspnea on Exertion] : dyspnea on exertion [Abdominal Pain] : no abdominal pain [Nausea] : no nausea [Constipation] : no constipation [Diarrhea] : diarrhea [Vomiting] : no vomiting [Dysuria] : no dysuria [Joint Pain] : no joint pain [Itching] : no itching [Skin Rash] : no skin rash [Headache] : no headache [Dizziness] : no dizziness [Fainting] : no fainting [Insomnia] : insomnia [Anxiety] : anxiety [Depression] : depression [Easy Bruising] : no easy bruising

## 2023-02-22 NOTE — HISTORY OF PRESENT ILLNESS
[FreeTextEntry1] : Pulmonary Follow Up.  [de-identified] : 67F w/ PMHx of anxiety, aortic aneurysm, CAD, GERD, Obesity s/p gastric bypass, HLD, HTN, hypothyroidism, morbid obesity, mitral valve regurgitation, pHTN, essential thrombocytosis, splenomegaly, and nocturnal hypoxemia presents to office today for pulmonary follow up.\par \par Pt reports feeling fatigued and dyspneic on exertion x 1 year. She is overweight and is deconditioned. She was found to have mild-moderate pHTN and nocturnal hypoxemia for which she was prescribed an oxygen concentrator. She reports never using it. She requests today an Rx for overnight pulse oximetry. \par \par She follows with Dr. Leone, cardiologist for annual Echo and EKG, she reports recently undergoing a cardiac PET scan with "normal results", she has a yearly CT of her chest to measure her aortic aneurysm. \par \par She recently (2/18/23) was evaluated at  ED for itchy palms that was felt to be a side effect of her essential thrombocytosis, she f/u with Dr. RUTH ANN Salazar and continues on Asa 81mg daily.

## 2023-02-22 NOTE — PLAN
[FreeTextEntry1] : 1. Rx given for overnight pulse oximetry.\par \par 2. Encouraged to use her oxygen concentrator. \par \par 3. F/U with Dr. Leone for routine CV evaluation.\par \par 4. F/U with Dr. Salazar.\par \par 5. F/U with Dr. Wyatt in 3 months for full PFTs.

## 2023-02-22 NOTE — PHYSICAL EXAM
[No Acute Distress] : no acute distress [Normal Sclera/Conjunctiva] : normal sclera/conjunctiva [No JVD] : no jugular venous distention [No Respiratory Distress] : no respiratory distress  [No Accessory Muscle Use] : no accessory muscle use [Clear to Auscultation] : lungs were clear to auscultation bilaterally [Normal Rate] : normal rate  [Regular Rhythm] : with a regular rhythm [Pedal Pulses Present] : the pedal pulses are present [No Edema] : there was no peripheral edema [No Extremity Clubbing/Cyanosis] : no extremity clubbing/cyanosis [Soft] : abdomen soft [No Rash] : no rash [No Focal Deficits] : no focal deficits [Normal Gait] : normal gait [Alert and Oriented x3] : oriented to person, place, and time [de-identified] : obese [de-identified] : rapid/pressured speech. Anxious

## 2023-02-24 ENCOUNTER — RX RENEWAL (OUTPATIENT)
Age: 68
End: 2023-02-24

## 2023-02-24 DIAGNOSIS — R06.09 OTHER FORMS OF DYSPNEA: ICD-10-CM

## 2023-02-24 DIAGNOSIS — E66.9 OBESITY, UNSPECIFIED: ICD-10-CM

## 2023-02-24 DIAGNOSIS — G47.00 INSOMNIA, UNSPECIFIED: ICD-10-CM

## 2023-02-24 DIAGNOSIS — R09.02 HYPOXEMIA: ICD-10-CM

## 2023-03-02 ENCOUNTER — APPOINTMENT (OUTPATIENT)
Dept: COLORECTAL SURGERY | Facility: CLINIC | Age: 68
End: 2023-03-02
Payer: MEDICARE

## 2023-03-02 VITALS
TEMPERATURE: 97.3 F | HEART RATE: 65 BPM | OXYGEN SATURATION: 97 % | WEIGHT: 216 LBS | HEIGHT: 62 IN | SYSTOLIC BLOOD PRESSURE: 114 MMHG | BODY MASS INDEX: 39.75 KG/M2 | DIASTOLIC BLOOD PRESSURE: 76 MMHG | RESPIRATION RATE: 16 BRPM

## 2023-03-02 PROCEDURE — 99214 OFFICE O/P EST MOD 30 MIN: CPT | Mod: 25

## 2023-03-02 PROCEDURE — 46221 LIGATION OF HEMORRHOID(S): CPT

## 2023-03-02 NOTE — ASSESSMENT
Detail Level: Detailed [FreeTextEntry1] : Ms. Salazar presents to the office for consultation secondary to prolapsing internal and external hemorrhoids. In office today, we proceeded to RBL her RP and RL column to good effect. She was advised on what to expect post procedure. Metamucil qAM was recommended to bulk up stools and allow for easier defecation. Anusol suppositories will be prescribed to decrease hemorrhoidal inflammation and swelling. Followup in 2-3 weeks for reassessment and for additional ligations.\par \par 8/1/22 Ms. Salazar returns to the office for additional RBL. In office today, the LL column was ligated x 2 to good effect. She was reminded on what to expect post procedure and can return to office in 2 - 3 weeks for additional ligations.\par \par 8/31/22 Ms Salazar returns to the office for rubber band ligation of now her prolapsing anterior column.  In office today, at bedside, the anterior column was ligated twice to good effect.  She is now undergone rubber band ligation of all 3 columns.  She was advised to follow-up as needed for recurrent hemorrhoidal issues.\par \par 3/2/23 Ms. Salazar returns to the office for follow-up.  In office today, additional rubber bands were placed to her left lateral as well as right anterior column to address hemorrhoidal prolapse.  She was reminded on what to expect postprocedure and I have advised her to follow-up in 2 to 3 weeks time for repeat ligations. Doxycycline Counseling:  Patient counseled regarding possible photosensitivity and increased risk for sunburn.  Patient instructed to avoid sunlight, if possible.  When exposed to sunlight, patients should wear protective clothing, sunglasses, and sunscreen.  The patient was instructed to call the office immediately if the following severe adverse effects occur:  hearing changes, easy bruising/bleeding, severe headache, or vision changes.  The patient verbalized understanding of the proper use and possible adverse effects of doxycycline.  All of the patient's questions and concerns were addressed. Isotretinoin Pregnancy And Lactation Text: This medication is Pregnancy Category X and is considered extremely dangerous during pregnancy. It is unknown if it is excreted in breast milk. Tetracycline Counseling: Patient counseled regarding possible photosensitivity and increased risk for sunburn.  Patient instructed to avoid sunlight, if possible.  When exposed to sunlight, patients should wear protective clothing, sunglasses, and sunscreen.  The patient was instructed to call the office immediately if the following severe adverse effects occur:  hearing changes, easy bruising/bleeding, severe headache, or vision changes.  The patient verbalized understanding of the proper use and possible adverse effects of tetracycline.  All of the patient's questions and concerns were addressed. Patient understands to avoid pregnancy while on therapy due to potential birth defects. Tetracycline Pregnancy And Lactation Text: This medication is Pregnancy Category D and not consider safe during pregnancy. It is also excreted in breast milk. Topical Sulfur Applications Counseling: Topical Sulfur Counseling: Patient counseled that this medication may cause skin irritation or allergic reactions.  In the event of skin irritation, the patient was advised to reduce the amount of the drug applied or use it less frequently.   The patient verbalized understanding of the proper use and possible adverse effects of topical sulfur application.  All of the patient's questions and concerns were addressed. Spironolactone Pregnancy And Lactation Text: This medication can cause feminization of the male fetus and should be avoided during pregnancy. The active metabolite is also found in breast milk. Benzoyl Peroxide Counseling: Patient counseled that medicine may cause skin irritation and bleach clothing.  In the event of skin irritation, the patient was advised to reduce the amount of the drug applied or use it less frequently.   The patient verbalized understanding of the proper use and possible adverse effects of benzoyl peroxide.  All of the patient's questions and concerns were addressed. Topical Clindamycin Counseling: Patient counseled that this medication may cause skin irritation or allergic reactions.  In the event of skin irritation, the patient was advised to reduce the amount of the drug applied or use it less frequently.   The patient verbalized understanding of the proper use and possible adverse effects of clindamycin.  All of the patient's questions and concerns were addressed. Birth Control Pills Pregnancy And Lactation Text: This medication should be avoided if pregnant and for the first 30 days post-partum. Include Pregnancy/Lactation Warning?: No Bactrim Counseling:  I discussed with the patient the risks of sulfa antibiotics including but not limited to GI upset, allergic reaction, drug rash, diarrhea, dizziness, photosensitivity, and yeast infections.  Rarely, more serious reactions can occur including but not limited to aplastic anemia, agranulocytosis, methemoglobinemia, blood dyscrasias, liver or kidney failure, lung infiltrates or desquamative/blistering drug rashes. Birth Control Pills Counseling: Birth Control Pill Counseling: I discussed with the patient the potential side effects of OCPs including but not limited to increased risk of stroke, heart attack, thrombophlebitis, deep venous thrombosis, hepatic adenomas, breast changes, GI upset, headaches, and depression.  The patient verbalized understanding of the proper use and possible adverse effects of OCPs. All of the patient's questions and concerns were addressed. Bactrim Pregnancy And Lactation Text: This medication is Pregnancy Category D and is known to cause fetal risk.  It is also excreted in breast milk. Isotretinoin Counseling: Patient should get monthly blood tests, not donate blood, not drive at night if vision affected, not share medication, and not undergo elective surgery for 6 months after tx completed. Side effects reviewed, pt to contact office should one occur. Erythromycin Counseling:  I discussed with the patient the risks of erythromycin including but not limited to GI upset, allergic reaction, drug rash, diarrhea, increase in liver enzymes, and yeast infections. Azithromycin Counseling:  I discussed with the patient the risks of azithromycin including but not limited to GI upset, allergic reaction, drug rash, diarrhea, and yeast infections. Dapsone Pregnancy And Lactation Text: This medication is Pregnancy Category C and is not considered safe during pregnancy or breast feeding. Tazorac Counseling:  Patient advised that medication is irritating and drying.  Patient may need to apply sparingly and wash off after an hour before eventually leaving it on overnight.  The patient verbalized understanding of the proper use and possible adverse effects of tazorac.  All of the patient's questions and concerns were addressed. Topical Clindamycin Pregnancy And Lactation Text: This medication is Pregnancy Category B and is considered safe during pregnancy. It is unknown if it is excreted in breast milk. Minocycline Counseling: Patient advised regarding possible photosensitivity and discoloration of the teeth, skin, lips, tongue and gums.  Patient instructed to avoid sunlight, if possible.  When exposed to sunlight, patients should wear protective clothing, sunglasses, and sunscreen.  The patient was instructed to call the office immediately if the following severe adverse effects occur:  hearing changes, easy bruising/bleeding, severe headache, or vision changes.  The patient verbalized understanding of the proper use and possible adverse effects of minocycline.  All of the patient's questions and concerns were addressed. Spironolactone Counseling: Patient advised regarding risks of diarrhea, abdominal pain, hyperkalemia, birth defects (for female patients), liver toxicity and renal toxicity. The patient may need blood work to monitor liver and kidney function and potassium levels while on therapy. The patient verbalized understanding of the proper use and possible adverse effects of spironolactone.  All of the patient's questions and concerns were addressed. High Dose Vitamin A Pregnancy And Lactation Text: High dose vitamin A therapy is contraindicated during pregnancy and breast feeding. Detail Level: Simple Doxycycline Pregnancy And Lactation Text: This medication is Pregnancy Category D and not consider safe during pregnancy. It is also excreted in breast milk but is considered safe for shorter treatment courses. Azithromycin Pregnancy And Lactation Text: This medication is considered safe during pregnancy and is also secreted in breast milk. Tazorac Pregnancy And Lactation Text: This medication is not safe during pregnancy. It is unknown if this medication is excreted in breast milk. High Dose Vitamin A Counseling: Side effects reviewed, pt to contact office should one occur. Topical Retinoid Pregnancy And Lactation Text: This medication is Pregnancy Category C. It is unknown if this medication is excreted in breast milk. Topical Retinoid counseling:  Patient advised to apply a pea-sized amount only at bedtime and wait 30 minutes after washing their face before applying.  If too drying, patient may add a non-comedogenic moisturizer. The patient verbalized understanding of the proper use and possible adverse effects of retinoids.  All of the patient's questions and concerns were addressed. Topical Sulfur Applications Pregnancy And Lactation Text: This medication is Pregnancy Category C and has an unknown safety profile during pregnancy. It is unknown if this topical medication is excreted in breast milk. Dapsone Counseling: I discussed with the patient the risks of dapsone including but not limited to hemolytic anemia, agranulocytosis, rashes, methemoglobinemia, kidney failure, peripheral neuropathy, headaches, GI upset, and liver toxicity.  Patients who start dapsone require monitoring including baseline LFTs and weekly CBCs for the first month, then every month thereafter.  The patient verbalized understanding of the proper use and possible adverse effects of dapsone.  All of the patient's questions and concerns were addressed. Erythromycin Pregnancy And Lactation Text: This medication is Pregnancy Category B and is considered safe during pregnancy. It is also excreted in breast milk. Benzoyl Peroxide Pregnancy And Lactation Text: This medication is Pregnancy Category C. It is unknown if benzoyl peroxide is excreted in breast milk.

## 2023-03-02 NOTE — PROCEDURE
[FreeTextEntry1] : RBL to RA and LL column\par  Procedure:\par The risks and benefits of the rubber band ligation were discussed with the patient. This included but was not limited to bleeding and sepsis the feeling of fullness in the rectum and the anticipated bleeding when the band falls off in 5-7 days.The hemorrhoid was grasped with the hemorrhoidal grasper. There was no sensation. A standard rubber band was applied to this hemorrhoid. The patient tolerated the procedure well without any complications.\par \par

## 2023-03-02 NOTE — HISTORY OF PRESENT ILLNESS
[FreeTextEntry1] : Ms. Salazar presents to the office for consultation secondary to prolapsing internal and external hemorrhoids. She notes that BMs are passed daily but of varying consistencies. She admits to straining a significant amount despite stools being soft. Last colonoscopy 5/16/2019.\par \par 8/1/22 Ms Salazar returns to the office for followup. No complaints since last office appt in which she underwent RBL. Continues to have issues with prolapse.\par \par 8/31/22 Ms. Salazar returns to the office for follow-up.  Since her last appointment, she reports that approximately 1-1/2 weeks ago, she noted another internal hemorrhoidal prolapse.\par \par 3/2/23 Ms. Salazar returns to the office for follow-up of her prolapsing internal and external hemorrhoids.  Since her last appointment approximately 6 months earlier, she notes recurrence of the prolapse.  Bowel movements continue to be soft and passed on a daily basis without issue though with habitual straining.

## 2023-03-02 NOTE — PHYSICAL EXAM
[Manually Reducible] : a manually reducible (grade III) [Skin Tags] : residual hemorrhoidal skin tags were noted [Normal] : was normal [None] : there was no rectal mass  [Gross Blood] : no gross blood [No Rash or Lesion] : No rash or lesion [Alert] : alert [Oriented to Person] : oriented to person [Oriented to Place] : oriented to place [Oriented to Time] : oriented to time [Calm] : calm [de-identified] :  anterior [de-identified] : NAD [de-identified] : NCAT [de-identified] : VICENTE x 4

## 2023-03-15 ENCOUNTER — APPOINTMENT (OUTPATIENT)
Dept: DERMATOLOGY | Facility: CLINIC | Age: 68
End: 2023-03-15
Payer: MEDICARE

## 2023-03-15 DIAGNOSIS — Z00.00 ENCOUNTER FOR GENERAL ADULT MEDICAL EXAMINATION W/OUT ABNORMAL FINDINGS: ICD-10-CM

## 2023-03-15 DIAGNOSIS — L82.1 OTHER SEBORRHEIC KERATOSIS: ICD-10-CM

## 2023-03-15 DIAGNOSIS — L81.4 OTHER MELANIN HYPERPIGMENTATION: ICD-10-CM

## 2023-03-15 DIAGNOSIS — L57.0 ACTINIC KERATOSIS: ICD-10-CM

## 2023-03-15 PROCEDURE — 17003 DESTRUCT PREMALG LES 2-14: CPT

## 2023-03-15 PROCEDURE — 99213 OFFICE O/P EST LOW 20 MIN: CPT | Mod: 25

## 2023-03-15 PROCEDURE — 17000 DESTRUCT PREMALG LESION: CPT

## 2023-03-15 NOTE — PHYSICAL EXAM
[Alert] : alert [Oriented x 3] : ~L oriented x 3 [Well Nourished] : well nourished [Full Body Skin Exam Performed] : performed [FreeTextEntry3] : A full skin exam was performed including the scalp, face, neck, chest, abdomen, back, buttocks, upper extremities and lower extremities.  The patient declined examination of the breasts and genitalia.  \par The exam did show the following benign growths:\par Seborrheic keratoses.\par Lentigines.\par \par keratotic papules, nasal tip, anterior nose, right temple.

## 2023-03-15 NOTE — HISTORY OF PRESENT ILLNESS
[FreeTextEntry1] : Patient presents for skin examination. [de-identified] : She notes rough lesion on the nose.  No bleeding, but present for over a month.

## 2023-03-30 ENCOUNTER — APPOINTMENT (OUTPATIENT)
Dept: COLORECTAL SURGERY | Facility: CLINIC | Age: 68
End: 2023-03-30
Payer: MEDICARE

## 2023-03-30 VITALS
SYSTOLIC BLOOD PRESSURE: 93 MMHG | RESPIRATION RATE: 16 BRPM | WEIGHT: 217 LBS | HEART RATE: 62 BPM | OXYGEN SATURATION: 96 % | DIASTOLIC BLOOD PRESSURE: 53 MMHG | BODY MASS INDEX: 38.93 KG/M2 | HEIGHT: 62.5 IN

## 2023-03-30 DIAGNOSIS — K64.8 OTHER HEMORRHOIDS: ICD-10-CM

## 2023-03-30 PROCEDURE — 99214 OFFICE O/P EST MOD 30 MIN: CPT | Mod: 25

## 2023-03-30 PROCEDURE — 46221 LIGATION OF HEMORRHOID(S): CPT

## 2023-03-30 NOTE — PHYSICAL EXAM
[Manually Reducible] : a manually reducible (grade III) [Thrombosed] : that was not thrombosed [Skin Tags] : residual hemorrhoidal skin tags were noted [Normal] : was normal [None] : there was no rectal mass  [Gross Blood] : no gross blood [No Rash or Lesion] : No rash or lesion [Alert] : alert [Oriented to Person] : oriented to person [Oriented to Place] : oriented to place [Oriented to Time] : oriented to time [Calm] : calm [de-identified] :  anterior [de-identified] : NAD [de-identified] : NCAT [de-identified] : VICENTE x 4

## 2023-03-30 NOTE — ASSESSMENT
[FreeTextEntry1] : Ms. Salazar presents to the office for consultation secondary to prolapsing internal and external hemorrhoids. In office today, we proceeded to RBL her RP and RL column to good effect. She was advised on what to expect post procedure. Metamucil qAM was recommended to bulk up stools and allow for easier defecation. Anusol suppositories will be prescribed to decrease hemorrhoidal inflammation and swelling. Followup in 2-3 weeks for reassessment and for additional ligations.\par \par 8/1/22 Ms. Salazar returns to the office for additional RBL. In office today, the LL column was ligated x 2 to good effect. She was reminded on what to expect post procedure and can return to office in 2 - 3 weeks for additional ligations.\par \par 8/31/22 Ms Salazar returns to the office for rubber band ligation of now her prolapsing anterior column.  In office today, at bedside, the anterior column was ligated twice to good effect.  She is now undergone rubber band ligation of all 3 columns.  She was advised to follow-up as needed for recurrent hemorrhoidal issues.\par \par 3/2/23 Ms. Salazar returns to the office for follow-up.  In office today, additional rubber bands were placed to her left lateral as well as right anterior column to address hemorrhoidal prolapse.  She was reminded on what to expect postprocedure and I have advised her to follow-up in 2 to 3 weeks time for repeat ligations.\par \par 3/30/23 Ms Salazar returns to the office for followup. A rubber band was placed to her left lateral column to good effect. She was reminded on what to expect post procedure and can f/u prn for additional ligations.

## 2023-03-30 NOTE — PROCEDURE
[FreeTextEntry1] : RBL  LL column\par  Procedure:\par The risks and benefits of the rubber band ligation were discussed with the patient. This included but was not limited to bleeding and sepsis the feeling of fullness in the rectum and the anticipated bleeding when the band falls off in 5-7 days.The hemorrhoid was grasped with the hemorrhoidal grasper. There was no sensation. A standard rubber band was applied to this hemorrhoid. The patient tolerated the procedure well without any complications.\par \par

## 2023-03-30 NOTE — HISTORY OF PRESENT ILLNESS
[FreeTextEntry1] : Ms. Salazar presents to the office for consultation secondary to prolapsing internal and external hemorrhoids. She notes that BMs are passed daily but of varying consistencies. She admits to straining a significant amount despite stools being soft. Last colonoscopy 5/16/2019.\par \par 8/1/22 Ms Salazar returns to the office for followup. No complaints since last office appt in which she underwent RBL. Continues to have issues with prolapse.\par \par 8/31/22 Ms. Salazar returns to the office for follow-up.  Since her last appointment, she reports that approximately 1-1/2 weeks ago, she noted another internal hemorrhoidal prolapse.\par \par 3/2/23 Ms. Salazar returns to the office for follow-up of her prolapsing internal and external hemorrhoids.  Since her last appointment approximately 6 months earlier, she notes recurrence of the prolapse.  Bowel movements continue to be soft and passed on a daily basis without issue though with habitual straining.\par \par 3/30/23 Here for followup of prolapsing internal and external hemorrhoids.

## 2023-04-21 ENCOUNTER — RX RENEWAL (OUTPATIENT)
Age: 68
End: 2023-04-21

## 2023-04-25 ENCOUNTER — NON-APPOINTMENT (OUTPATIENT)
Age: 68
End: 2023-04-25

## 2023-05-10 DIAGNOSIS — R39.9 UNSPECIFIED SYMPTOMS AND SIGNS INVOLVING THE GENITOURINARY SYSTEM: ICD-10-CM

## 2023-05-10 RX ORDER — NITROFURANTOIN MACROCRYSTALS 100 MG/1
100 CAPSULE ORAL
Qty: 45 | Refills: 0 | Status: COMPLETED | COMMUNITY
Start: 2022-01-21 | End: 2023-05-10

## 2023-05-10 RX ORDER — NITROFURANTOIN MACROCRYSTALS 50 MG/1
50 CAPSULE ORAL
Qty: 45 | Refills: 0 | Status: DISCONTINUED | COMMUNITY
Start: 2017-01-31 | End: 2023-05-10

## 2023-05-12 ENCOUNTER — EMERGENCY (EMERGENCY)
Facility: HOSPITAL | Age: 68
LOS: 0 days | Discharge: HOME | End: 2023-05-12
Attending: EMERGENCY MEDICINE
Payer: MEDICARE

## 2023-05-12 VITALS
DIASTOLIC BLOOD PRESSURE: 70 MMHG | TEMPERATURE: 98 F | RESPIRATION RATE: 20 BRPM | HEART RATE: 80 BPM | SYSTOLIC BLOOD PRESSURE: 125 MMHG | OXYGEN SATURATION: 99 %

## 2023-05-12 VITALS — WEIGHT: 214.95 LBS | HEIGHT: 62 IN

## 2023-05-12 DIAGNOSIS — E03.9 HYPOTHYROIDISM, UNSPECIFIED: ICD-10-CM
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
## 2023-05-12 DIAGNOSIS — K21.9 GASTRO-ESOPHAGEAL REFLUX DISEASE WITHOUT ESOPHAGITIS: ICD-10-CM

## 2023-05-12 DIAGNOSIS — G90.2 HORNER'S SYNDROME: ICD-10-CM

## 2023-05-12 DIAGNOSIS — M85.80 OTHER SPECIFIED DISORDERS OF BONE DENSITY AND STRUCTURE, UNSPECIFIED SITE: ICD-10-CM

## 2023-05-12 DIAGNOSIS — E78.5 HYPERLIPIDEMIA, UNSPECIFIED: ICD-10-CM

## 2023-05-12 DIAGNOSIS — Z98.84 BARIATRIC SURGERY STATUS: ICD-10-CM

## 2023-05-12 DIAGNOSIS — K64.9 UNSPECIFIED HEMORRHOIDS: ICD-10-CM

## 2023-05-12 DIAGNOSIS — I34.0 NONRHEUMATIC MITRAL (VALVE) INSUFFICIENCY: ICD-10-CM

## 2023-05-12 DIAGNOSIS — F32.A DEPRESSION, UNSPECIFIED: ICD-10-CM

## 2023-05-12 DIAGNOSIS — M79.675 PAIN IN LEFT TOE(S): ICD-10-CM

## 2023-05-12 DIAGNOSIS — Z88.2 ALLERGY STATUS TO SULFONAMIDES: ICD-10-CM

## 2023-05-12 DIAGNOSIS — Z96.651 PRESENCE OF RIGHT ARTIFICIAL KNEE JOINT: ICD-10-CM

## 2023-05-12 DIAGNOSIS — D75.839 THROMBOCYTOSIS, UNSPECIFIED: ICD-10-CM

## 2023-05-12 DIAGNOSIS — Z90.49 ACQUIRED ABSENCE OF OTHER SPECIFIED PARTS OF DIGESTIVE TRACT: ICD-10-CM

## 2023-05-12 DIAGNOSIS — Z90.49 ACQUIRED ABSENCE OF OTHER SPECIFIED PARTS OF DIGESTIVE TRACT: Chronic | ICD-10-CM

## 2023-05-12 DIAGNOSIS — F41.9 ANXIETY DISORDER, UNSPECIFIED: ICD-10-CM

## 2023-05-12 DIAGNOSIS — S90.112A CONTUSION OF LEFT GREAT TOE WITHOUT DAMAGE TO NAIL, INITIAL ENCOUNTER: ICD-10-CM

## 2023-05-12 DIAGNOSIS — G47.30 SLEEP APNEA, UNSPECIFIED: ICD-10-CM

## 2023-05-12 DIAGNOSIS — Z88.8 ALLERGY STATUS TO OTHER DRUGS, MEDICAMENTS AND BIOLOGICAL SUBSTANCES: ICD-10-CM

## 2023-05-12 DIAGNOSIS — M19.90 UNSPECIFIED OSTEOARTHRITIS, UNSPECIFIED SITE: ICD-10-CM
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
## 2023-05-12 DIAGNOSIS — Z98.890 OTHER SPECIFIED POSTPROCEDURAL STATES: ICD-10-CM

## 2023-05-12 DIAGNOSIS — I25.10 ATHEROSCLEROTIC HEART DISEASE OF NATIVE CORONARY ARTERY WITHOUT ANGINA PECTORIS: ICD-10-CM

## 2023-05-12 DIAGNOSIS — K57.90 DIVERTICULOSIS OF INTESTINE, PART UNSPECIFIED, WITHOUT PERFORATION OR ABSCESS WITHOUT BLEEDING: ICD-10-CM

## 2023-05-12 DIAGNOSIS — Z86.2 PERSONAL HISTORY OF DISEASES OF THE BLOOD AND BLOOD-FORMING ORGANS AND CERTAIN DISORDERS INVOLVING THE IMMUNE MECHANISM: ICD-10-CM

## 2023-05-12 DIAGNOSIS — I10 ESSENTIAL (PRIMARY) HYPERTENSION: ICD-10-CM

## 2023-05-12 DIAGNOSIS — K80.20 CALCULUS OF GALLBLADDER WITHOUT CHOLECYSTITIS WITHOUT OBSTRUCTION: ICD-10-CM

## 2023-05-12 PROCEDURE — 73630 X-RAY EXAM OF FOOT: CPT | Mod: 26,LT

## 2023-05-12 PROCEDURE — 99283 EMERGENCY DEPT VISIT LOW MDM: CPT | Mod: 25

## 2023-05-12 PROCEDURE — 99284 EMERGENCY DEPT VISIT MOD MDM: CPT

## 2023-05-12 PROCEDURE — 73630 X-RAY EXAM OF FOOT: CPT | Mod: LT

## 2023-05-12 RX ORDER — IBUPROFEN 200 MG
600 TABLET ORAL ONCE
Refills: 0 | Status: DISCONTINUED | OUTPATIENT
Start: 2023-05-12 | End: 2023-05-12

## 2023-05-12 RX ORDER — LIDOCAINE 4 G/100G
1 CREAM TOPICAL ONCE
Refills: 0 | Status: COMPLETED | OUTPATIENT
Start: 2023-05-12 | End: 2023-05-12

## 2023-05-12 RX ADMIN — LIDOCAINE 1 PATCH: 4 CREAM TOPICAL at 23:02

## 2023-05-12 NOTE — ED PROVIDER NOTE - OBJECTIVE STATEMENT
68 y/o female in ED c/o left big toe pain s/p trip and fall over dog gate tonight.   pt states that she was at a friend's house and was climbing over her dog gate when dog came running leading her to fall and jamming left big toe.   pt denies any LOC, HA, head trauma, neck pain, cp, sob, n/v/d/abd pain.   states noticed bruising to left big toe.   states no relief with tylenol.

## 2023-05-12 NOTE — ED PROVIDER NOTE - CLINICAL SUMMARY MEDICAL DECISION MAKING FREE TEXT BOX
68 y/o female in ED c/o left big toe pain s/p trip and fall over dog gate tonight.   pt states that she was at a friend's house and was climbing over her dog gate when dog came running leading her to fall and jamming left big toe.   pt denies any LOC, HA, head trauma, neck pain, cp, sob, n/v/d/abd pain.   states noticed bruising to left big toe.   states no relief with tylenol.       PE:   visible bruising and mild swelling to left big toe.   FAROM.   mild ttp of left big toe.    will XR, meds and reeval 68 y/o female in ED c/o left big toe pain s/p trip and fall over dog gate tonight.   pt states that she was at a friend's house and was climbing over her dog gate when dog came running leading her to fall and jamming left big toe.   pt denies any LOC, HA, head trauma, neck pain, cp, sob, n/v/d/abd pain.   states noticed bruising to left big toe.   states no relief with tylenol.       PE:   visible bruising and mild swelling to left big toe.   FAROM.   mild ttp of left big toe.    will XR, meds and reeval    pt told of XR results.   agree with plan for d/c home with f/u

## 2023-05-12 NOTE — ED PROVIDER NOTE - PATIENT PORTAL LINK FT
You can access the FollowMyHealth Patient Portal offered by [facility] by registering at the following website: [email or web address]. By joining [employer]’s FollowMyHealth portal, you will also be able to view your health information using other applications (apps) compatible with our system.

## 2023-05-12 NOTE — ED ADULT TRIAGE NOTE - CHIEF COMPLAINT QUOTE
Around 3pm, patient tripped over dog gate and injured foot. Denies hitting head, no other injuries. Complaining of left foot pain, unrelieved by Tylenol. Ambulating with limp.

## 2023-05-18 ENCOUNTER — APPOINTMENT (OUTPATIENT)
Dept: UROLOGY | Facility: CLINIC | Age: 68
End: 2023-05-18
Payer: MEDICARE

## 2023-05-18 DIAGNOSIS — N39.0 URINARY TRACT INFECTION, SITE NOT SPECIFIED: ICD-10-CM

## 2023-05-18 PROCEDURE — 99442: CPT | Mod: 95

## 2023-05-26 LAB
APPEARANCE: CLEAR
BACTERIA UR CULT: NORMAL
BACTERIA: NEGATIVE /HPF
BILIRUBIN URINE: NEGATIVE
BLOOD URINE: NEGATIVE
CAST: 12 /LPF
COLOR: YELLOW
EPITHELIAL CELLS: 4 /HPF
GLUCOSE QUALITATIVE U: NEGATIVE MG/DL
HYALINE CASTS: PRESENT
KETONES URINE: NEGATIVE MG/DL
LEUKOCYTE ESTERASE URINE: ABNORMAL
MICROSCOPIC-UA: NORMAL
NITRITE URINE: NEGATIVE
PH URINE: 5.5
PROTEIN URINE: NORMAL MG/DL
RED BLOOD CELLS URINE: 1 /HPF
REVIEW: NORMAL
SPECIFIC GRAVITY URINE: 1.02
UROBILINOGEN URINE: 0.2 MG/DL
WHITE BLOOD CELLS URINE: 4 /HPF

## 2023-05-30 LAB — URINE CYTOLOGY: NORMAL

## 2023-05-31 ENCOUNTER — APPOINTMENT (OUTPATIENT)
Dept: OBGYN | Facility: CLINIC | Age: 68
End: 2023-05-31
Payer: MEDICARE

## 2023-05-31 ENCOUNTER — NON-APPOINTMENT (OUTPATIENT)
Age: 68
End: 2023-05-31

## 2023-05-31 VITALS
DIASTOLIC BLOOD PRESSURE: 80 MMHG | WEIGHT: 208 LBS | SYSTOLIC BLOOD PRESSURE: 120 MMHG | BODY MASS INDEX: 38.28 KG/M2 | HEIGHT: 62 IN

## 2023-05-31 DIAGNOSIS — N94.10 UNSPECIFIED DYSPAREUNIA: ICD-10-CM

## 2023-05-31 PROCEDURE — 99213 OFFICE O/P EST LOW 20 MIN: CPT

## 2023-05-31 NOTE — DISCUSSION/SUMMARY
[FreeTextEntry1] : 1) pt advised to obtain clearance letter from [facility] for vaginal estrogen usage given her inherited clotting disorder\par \par If approved, will contact patient and issue RX

## 2023-05-31 NOTE — HISTORY OF PRESENT ILLNESS
[Currently Active] : currently active [FreeTextEntry1] : Viktoriya is a 66 y/o   presents today desiring to restart vaginal estrogen.\par \par She is up to date with her mammogram.\par \par She has a hematological condition called essential thrombocymia which reuslts in elevated Platelets. She is currently taking Baby Aspirin daily as per her hematologist. [Mammogramdate] : 11/29/22

## 2023-06-03 ENCOUNTER — TRANSCRIPTION ENCOUNTER (OUTPATIENT)
Age: 68
End: 2023-06-03

## 2023-07-03 ENCOUNTER — NON-APPOINTMENT (OUTPATIENT)
Age: 68
End: 2023-07-03

## 2023-09-05 ENCOUNTER — RX CHANGE (OUTPATIENT)
Age: 68
End: 2023-09-05

## 2023-09-05 RX ORDER — DEXLANSOPRAZOLE 60 MG/1
60 CAPSULE, DELAYED RELEASE ORAL
Qty: 90 | Refills: 0 | Status: ACTIVE | COMMUNITY
Start: 2023-08-10 | End: 1900-01-01

## 2023-09-21 ENCOUNTER — APPOINTMENT (OUTPATIENT)
Dept: GASTROENTEROLOGY | Facility: CLINIC | Age: 68
End: 2023-09-21
Payer: MEDICARE

## 2023-09-21 VITALS
HEIGHT: 62 IN | DIASTOLIC BLOOD PRESSURE: 78 MMHG | BODY MASS INDEX: 38.28 KG/M2 | SYSTOLIC BLOOD PRESSURE: 118 MMHG | WEIGHT: 208 LBS

## 2023-09-21 DIAGNOSIS — Z09 ENCOUNTER FOR FOLLOW-UP EXAMINATION AFTER COMPLETED TREATMENT FOR CONDITIONS OTHER THAN MALIGNANT NEOPLASM: ICD-10-CM

## 2023-09-21 PROCEDURE — 99213 OFFICE O/P EST LOW 20 MIN: CPT

## 2023-10-30 DIAGNOSIS — Z12.39 ENCOUNTER FOR OTHER SCREENING FOR MALIGNANT NEOPLASM OF BREAST: ICD-10-CM

## 2023-11-08 ENCOUNTER — NON-APPOINTMENT (OUTPATIENT)
Age: 68
End: 2023-11-08

## 2023-12-01 ENCOUNTER — APPOINTMENT (OUTPATIENT)
Dept: OBGYN | Facility: CLINIC | Age: 68
End: 2023-12-01
Payer: MEDICARE

## 2023-12-01 VITALS
BODY MASS INDEX: 32.57 KG/M2 | HEIGHT: 62 IN | SYSTOLIC BLOOD PRESSURE: 108 MMHG | WEIGHT: 177 LBS | DIASTOLIC BLOOD PRESSURE: 66 MMHG

## 2023-12-01 DIAGNOSIS — F52.31 FEMALE ORGASMIC DISORDER: ICD-10-CM

## 2023-12-01 DIAGNOSIS — N95.2 POSTMENOPAUSAL ATROPHIC VAGINITIS: ICD-10-CM

## 2023-12-01 DIAGNOSIS — Z87.42 PERSONAL HISTORY OF OTHER DISEASES OF THE FEMALE GENITAL TRACT: ICD-10-CM

## 2023-12-01 DIAGNOSIS — M85.80 OTHER SPECIFIED DISORDERS OF BONE DENSITY AND STRUCTURE, UNSPECIFIED SITE: ICD-10-CM

## 2023-12-01 DIAGNOSIS — N95.1 MENOPAUSAL AND FEMALE CLIMACTERIC STATES: ICD-10-CM

## 2023-12-01 DIAGNOSIS — K64.4 RESIDUAL HEMORRHOIDAL SKIN TAGS: ICD-10-CM

## 2023-12-01 PROCEDURE — 99214 OFFICE O/P EST MOD 30 MIN: CPT

## 2023-12-01 RX ORDER — SILDENAFIL CITRATE 100 %
POWDER (GRAM) MISCELLANEOUS
Qty: 1 | Refills: 1 | Status: ACTIVE | COMMUNITY
Start: 2023-12-01 | End: 1900-01-01

## 2023-12-03 LAB — HPV HIGH+LOW RISK DNA PNL CVX: NOT DETECTED

## 2023-12-05 LAB — CYTOLOGY CVX/VAG DOC THIN PREP: NORMAL

## 2023-12-14 NOTE — DISCHARGE NOTE NURSING/CASE MANAGEMENT/SOCIAL WORK - HISTORY OF COVID-19 VACCINATION
TC to patient to relay information about the PA process and that [facility] would not be able to submit the PA for re-authorization on 1/2/24.    Patient stated she is due for an infusion on 12/19/23 and then again on 1/2/24.    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    Patient would like to know if clinician recommends that she wait to go back to school until she receives her infusion in January when the PA is approved and new orders are written?   Yes

## 2023-12-18 ENCOUNTER — RX RENEWAL (OUTPATIENT)
Age: 68
End: 2023-12-18

## 2023-12-18 RX ORDER — ESTRADIOL 0.1 MG/G
0.1 CREAM VAGINAL
Qty: 42.5 | Refills: 3 | Status: ACTIVE | COMMUNITY
Start: 2023-06-03 | End: 1900-01-01

## 2024-02-29 ENCOUNTER — APPOINTMENT (OUTPATIENT)
Dept: GASTROENTEROLOGY | Facility: CLINIC | Age: 69
End: 2024-02-29
Payer: MEDICARE

## 2024-02-29 VITALS
SYSTOLIC BLOOD PRESSURE: 116 MMHG | HEIGHT: 62 IN | DIASTOLIC BLOOD PRESSURE: 68 MMHG | WEIGHT: 160 LBS | BODY MASS INDEX: 29.44 KG/M2

## 2024-02-29 DIAGNOSIS — R10.12 LEFT UPPER QUADRANT PAIN: ICD-10-CM

## 2024-02-29 DIAGNOSIS — R16.0 HEPATOMEGALY, NOT ELSEWHERE CLASSIFIED: ICD-10-CM

## 2024-02-29 PROCEDURE — 99213 OFFICE O/P EST LOW 20 MIN: CPT

## 2024-03-01 PROBLEM — R10.12 LUQ DISCOMFORT: Status: ACTIVE | Noted: 2024-03-01

## 2024-03-01 PROBLEM — R16.0 HEPATOMEGALY: Status: ACTIVE | Noted: 2023-09-22

## 2024-03-01 NOTE — HISTORY OF PRESENT ILLNESS
[FreeTextEntry1] : Ms. JENIFER GREENE is a 68 year old female with history of left-sided abdominal discomfort.  Patient has long history of IBS type symptoms.  Patient is status post gastric bypass.  Patient has a known umbilical hernia and is concerned.  Patient has been moving her bowels well and notes occasional blood on the outside of the stool.  Patient also has a history hepatosplenomegaly which has been followed as a result of her essential thrombocytosis.

## 2024-03-01 NOTE — ASSESSMENT
[FreeTextEntry1] : 67 yo female with history of gastric bypass along with history of umbilical hernia.  Patient has multiple complaints.  No specific finding needing intervention noted at this time.  Patient to call back with any concerns.

## 2024-03-30 ENCOUNTER — RX RENEWAL (OUTPATIENT)
Age: 69
End: 2024-03-30

## 2024-04-12 ENCOUNTER — RX RENEWAL (OUTPATIENT)
Age: 69
End: 2024-04-12

## 2024-04-12 RX ORDER — DEXLANSOPRAZOLE 30 MG/1
30 CAPSULE, DELAYED RELEASE ORAL
Qty: 180 | Refills: 1 | Status: ACTIVE | COMMUNITY
Start: 2023-02-24 | End: 1900-01-01

## 2024-05-03 DIAGNOSIS — R35.0 FREQUENCY OF MICTURITION: ICD-10-CM

## 2024-06-06 ENCOUNTER — NON-APPOINTMENT (OUTPATIENT)
Age: 69
End: 2024-06-06

## 2024-06-17 NOTE — PHYSICAL EXAM
Reason for Disposition   SEVERE headache (e.g., excruciating) and has had severe headaches before   SEVERE headache and not relieved by pain meds    Protocols used: Headache-A-[place]    No appts available county-wide.  Pt does not wish to go to .  Next day appt scheduled with PCP.     Pt understands to report to ER for any worsening of condition prior to appt tomorrow.    [Appropriately responsive] : appropriately responsive [Alert] : alert [No Acute Distress] : no acute distress

## 2024-06-20 RX ORDER — NITROFURANTOIN (MONOHYDRATE/MACROCRYSTALS) 25; 75 MG/1; MG/1
100 CAPSULE ORAL
Qty: 15 | Refills: 0 | Status: ACTIVE | COMMUNITY
Start: 2023-05-10 | End: 1900-01-01

## 2024-07-28 PROBLEM — Z86.69 HISTORY OF HORNER'S SYNDROME: Status: RESOLVED | Noted: 2020-10-26 | Resolved: 2024-07-28

## 2024-08-02 ENCOUNTER — APPOINTMENT (OUTPATIENT)
Dept: UROLOGY | Facility: CLINIC | Age: 69
End: 2024-08-02
Payer: MEDICARE

## 2024-08-02 VITALS
TEMPERATURE: 97.6 F | OXYGEN SATURATION: 96 % | DIASTOLIC BLOOD PRESSURE: 63 MMHG | BODY MASS INDEX: 28.89 KG/M2 | SYSTOLIC BLOOD PRESSURE: 99 MMHG | HEIGHT: 62 IN | HEART RATE: 80 BPM | WEIGHT: 157 LBS | RESPIRATION RATE: 15 BRPM

## 2024-08-02 DIAGNOSIS — N39.0 URINARY TRACT INFECTION, SITE NOT SPECIFIED: ICD-10-CM

## 2024-08-02 PROCEDURE — 99202 OFFICE O/P NEW SF 15 MIN: CPT

## 2024-08-02 NOTE — ADDENDUM
[FreeTextEntry1] : I, Ale Del Rio, acted as a scribe on behalf of Dr. Hutton 08/02/2024.   All medical record entries made by Ale De lRio were at my, Dr.Kip Hutton, direction and personally dictated by me on  08/02/2024. I have reviewed the chart and agree that the record accurately reflects my personal performance of the history, physical exam, assessment, and plan. I have also personally directed, reviewed, and agreed with the chart.

## 2024-08-02 NOTE — HISTORY OF PRESENT ILLNESS
[FreeTextEntry1] : JENIFER GREENE is a 69 year female patient presenting for a checkup and refill of current medications. Pt. has a history of recurrent UTI. Pt. reports no current difficulties or symptoms of note.

## 2024-08-02 NOTE — END OF VISIT
[FreeTextEntry3] : Recommendations: Refill of current medications provided. Urine sample sent out. Patient advised to follow up in one year or as needed.

## 2024-08-03 LAB
APPEARANCE: ABNORMAL
BACTERIA: ABNORMAL /HPF
BILIRUBIN URINE: NEGATIVE
BLOOD URINE: NEGATIVE
CAST: 1 /LPF
COLOR: NORMAL
EPITHELIAL CELLS: 9 /HPF
GLUCOSE QUALITATIVE U: NEGATIVE MG/DL
KETONES URINE: ABNORMAL MG/DL
LEUKOCYTE ESTERASE URINE: ABNORMAL
MICROSCOPIC-UA: NORMAL
NITRITE URINE: NEGATIVE
PH URINE: 5.5
PROTEIN URINE: NORMAL MG/DL
RED BLOOD CELLS URINE: 0 /HPF
SPECIFIC GRAVITY URINE: 1.03
UROBILINOGEN URINE: 1 MG/DL
WHITE BLOOD CELLS URINE: 7 /HPF

## 2024-08-05 LAB — BACTERIA UR CULT: NORMAL

## 2024-08-22 ENCOUNTER — APPOINTMENT (OUTPATIENT)
Dept: GASTROENTEROLOGY | Facility: AMBULATORY MEDICAL SERVICES | Age: 69
End: 2024-08-22
Payer: MEDICARE

## 2024-08-22 PROCEDURE — 45378 DIAGNOSTIC COLONOSCOPY: CPT

## 2024-09-03 NOTE — HISTORY OF PRESENT ILLNESS
-DM2 in obese.  New diagnosis.  Lab Results   Component Value Date    HGBA1C 6.5 (H) 08/29/2024   -discussed pathophysiology of insulin resistance.  -advised diabetic diet.  Declines referral to nutritionist at this time  -start monitoring FBS with log for review  -recheck A1c in 6months  -foot exam performed.   [FreeTextEntry1] : Patient is a 67-year-old female presents for a gynecologic evaluation.  Patient has no complaints.  Patient's last mammogram was November 2021 last bone density was 2 years ago.  Patient states that she occasionally has some pelvic discomfort and recently had a pelvic ultrasound which was within normal limits.  Patient advised to follow-up with her primary care physician.

## 2024-11-12 ENCOUNTER — NON-APPOINTMENT (OUTPATIENT)
Age: 69
End: 2024-11-12

## 2024-12-03 ENCOUNTER — EMERGENCY (EMERGENCY)
Facility: HOSPITAL | Age: 69
LOS: 0 days | Discharge: HOME | End: 2024-12-03
Attending: EMERGENCY MEDICINE
Payer: MEDICARE

## 2024-12-03 VITALS
RESPIRATION RATE: 17 BRPM | OXYGEN SATURATION: 98 % | TEMPERATURE: 98 F | HEART RATE: 66 BPM | SYSTOLIC BLOOD PRESSURE: 114 MMHG | DIASTOLIC BLOOD PRESSURE: 76 MMHG

## 2024-12-03 VITALS — WEIGHT: 188.94 LBS

## 2024-12-03 DIAGNOSIS — Z79.85 LONG-TERM (CURRENT) USE OF INJECTABLE NON-INSULIN ANTIDIABETIC DRUGS: ICD-10-CM

## 2024-12-03 DIAGNOSIS — I25.10 ATHEROSCLEROTIC HEART DISEASE OF NATIVE CORONARY ARTERY WITHOUT ANGINA PECTORIS: ICD-10-CM

## 2024-12-03 DIAGNOSIS — L29.9 PRURITUS, UNSPECIFIED: ICD-10-CM

## 2024-12-03 DIAGNOSIS — F32.A DEPRESSION, UNSPECIFIED: ICD-10-CM

## 2024-12-03 DIAGNOSIS — Z90.49 ACQUIRED ABSENCE OF OTHER SPECIFIED PARTS OF DIGESTIVE TRACT: Chronic | ICD-10-CM

## 2024-12-03 DIAGNOSIS — Z88.2 ALLERGY STATUS TO SULFONAMIDES: ICD-10-CM

## 2024-12-03 DIAGNOSIS — Z88.8 ALLERGY STATUS TO OTHER DRUGS, MEDICAMENTS AND BIOLOGICAL SUBSTANCES: ICD-10-CM

## 2024-12-03 PROCEDURE — 99283 EMERGENCY DEPT VISIT LOW MDM: CPT

## 2024-12-03 RX ORDER — FAMOTIDINE 20 MG/1
1 TABLET, FILM COATED ORAL
Qty: 10 | Refills: 0
Start: 2024-12-03 | End: 2024-12-07

## 2024-12-03 RX ORDER — DEXAMETHASONE 1.5 MG/1
10 TABLET ORAL ONCE
Refills: 0 | Status: COMPLETED | OUTPATIENT
Start: 2024-12-03 | End: 2024-12-03

## 2024-12-03 RX ORDER — FAMOTIDINE 20 MG/1
20 TABLET, FILM COATED ORAL ONCE
Refills: 0 | Status: COMPLETED | OUTPATIENT
Start: 2024-12-03 | End: 2024-12-03

## 2024-12-03 RX ORDER — PREDNISONE 20 MG/1
1 TABLET ORAL
Qty: 3 | Refills: 0
Start: 2024-12-03 | End: 2024-12-05

## 2024-12-03 RX ADMIN — DEXAMETHASONE 10 MILLIGRAM(S): 1.5 TABLET ORAL at 05:34

## 2024-12-03 RX ADMIN — FAMOTIDINE 20 MILLIGRAM(S): 20 TABLET, FILM COATED ORAL at 05:34

## 2024-12-03 NOTE — ED ADULT TRIAGE NOTE - CHIEF COMPLAINT QUOTE
Pt ambulatory to ED with c/o itching to b/l hands, and b/l feet. Pt reports she was in bed when symtpoms began. Took Zyrtec with no relief. Denies use of new products or foods. No rash noted to b/l feet and hands. A&Ox3, +allergies

## 2024-12-03 NOTE — ED ADULT NURSE NOTE - OBJECTIVE STATEMENT
Pt is 69y female, A&Ox3, breathing unlabored, presents ambulatory to ED with c/o b/l palms and soles. Patient states itching started approx 2 hours PTA in the palm of the left hand without noticeable rash.  Patient states that she was scratching aggressively but this did not relieve the itch so she had applied hydrocortisone cream as the itch was spreading to the right hand and soles of the feet at that time.  Patient also took a hot shower and then took Zyrtec.  Patient notes that by the time she arrived to the emergency department the itch has resolved.  Patient denies any chest pain or shortness of breath.

## 2024-12-03 NOTE — ED ADULT NURSE NOTE - NSICDXPASTMEDICALHX_GEN_ALL_CORE_FT
Render Risk Assessment In Note?: no
Detail Level: Simple
Additional Notes: Continue Clobetsol and OTC Hydrocortisone PRN. Apply moisturizer daily.
PAST MEDICAL HISTORY:  Achilles tendinosis of left lower extremity     Anemia     Anxiety     Aortic aneurysm without rupture CT scan every year for monitoring has been stable for the past years    CAD (coronary artery disease)     Chronic pain of right ankle     Chronic UTI (urinary tract infection)     Congenital anomaly of aorta     Depression     Diverticulosis     Gallstones h/o- ccy    Gastric bypass status for obesity sept 2003    GERD (gastroesophageal reflux disease)     H/O total knee replacement, right     Hemorrhoids     Hernia     History of thrombocytosis     HLD (hyperlipidemia)     Hanny's syndrome     HTN (hypertension)     Hypothyroid     Insomnia     Mitral valve regurgitation     Morbid obesity     Osteoarthritis     Osteopenia     Peroneal tendinitis of left lower extremity     Polyp of corpus uteri     Sleep apnea awating results    Splenomegaly mild to moderate- followed by hematologist    Tear of lateral meniscus of right knee

## 2024-12-03 NOTE — ED PROVIDER NOTE - PATIENT PORTAL LINK FT
You can access the FollowMyHealth Patient Portal offered by [facility] by registering at the following website: [email or web address]. By joining [facility]’s FollowMyHealth portal, you will also be able to view your health information using other applications (apps) compatible with our system.

## 2024-12-03 NOTE — ED PROVIDER NOTE - OBJECTIVE STATEMENT
,69-year-old female with history of depression, CAD, anxiety, status post cholecystectomy status post gastric bypass currently taking Ozempic with recent 60 pound weight loss presents for evaluation of pruritus of the bilateral palms and soles.  Patient notes that the pruritus started about 2 hours prior to arrival in the palm of the left hand without noticeable rash.  Patient states that she was scratching aggressively but this did not relieve the pruritus so she had applied hydrocortisone cream as the itch was spreading to the right hand and soles of the feet at that time.  Patient also took a hot shower and then took Zyrtec.  Patient notes that by the time she arrived to the emergency department the itch has resolved.  Patient denies any chest pain or shortness of breath.  On exam, patient is awake, alert and oriented x 3 in no apparent distress.  HEENT exam is normal with patent airway.  Lungs are clear to auscultation bilaterally.  Heart is regular rate and rhythm no murmurs rubs or gallops.  Nonfocal neurological exam.  Skin is warm, dry and intact with no evidence of rash at this time.

## 2024-12-03 NOTE — ED PROVIDER NOTE - CLINICAL SUMMARY MEDICAL DECISION MAKING FREE TEXT BOX
,69-year-old female with history of depression, CAD, anxiety, status post cholecystectomy status post gastric bypass currently taking Ozempic with recent 60 pound weight loss presents for evaluation of pruritus of the bilateral palms and soles.  Patient notes that the pruritus started about 2 hours prior to arrival in the palm of the left hand without noticeable rash.  Patient states that she was scratching aggressively but this did not relieve the pruritus so she had applied hydrocortisone cream as the itch was spreading to the right hand and soles of the feet at that time.  Patient also took a hot shower and then took Zyrtec.  Patient notes that by the time she arrived to the emergency department the itch has resolved.  Patient denies any chest pain or shortness of breath.  On exam, patient is awake, alert and oriented x 3 in no apparent distress.  HEENT exam is normal with patent airway.  Lungs are clear to auscultation bilaterally.  Heart is regular rate and rhythm no murmurs rubs or gallops.  Nonfocal neurological exam.  Skin is warm, dry and intact with no evidence of rash at this time.  Will give 1 dose of Decadron and Pepcid.  Patient advised to continue taking Zyrtec daily and Pepcid twice daily.  Patient to follow-up closely with her primary care physician within the next 1 to 3 days.

## 2024-12-03 NOTE — ED ADULT NURSE NOTE - CAS TRG GENERAL NORM CIRC DET
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 She previously underwent left parietal craniotomy for tumor resection and adjuvant radiation approximately 3 years ago In November 2017 at another institution. MRI brain demonstrates a recurrent 3 cm left parietal enhancing brain mass in the area of prior tumor resection and radiation. 
Strong peripheral pulses

## 2024-12-04 ENCOUNTER — RX RENEWAL (OUTPATIENT)
Age: 69
End: 2024-12-04

## 2024-12-30 ENCOUNTER — RX RENEWAL (OUTPATIENT)
Age: 69
End: 2024-12-30

## 2025-01-03 ENCOUNTER — APPOINTMENT (OUTPATIENT)
Dept: OBGYN | Facility: CLINIC | Age: 70
End: 2025-01-03
Payer: MEDICARE

## 2025-01-03 VITALS
WEIGHT: 157 LBS | BODY MASS INDEX: 28.89 KG/M2 | HEIGHT: 62 IN | SYSTOLIC BLOOD PRESSURE: 114 MMHG | DIASTOLIC BLOOD PRESSURE: 70 MMHG

## 2025-01-03 DIAGNOSIS — F52.31 FEMALE ORGASMIC DISORDER: ICD-10-CM

## 2025-01-03 DIAGNOSIS — N95.2 POSTMENOPAUSAL ATROPHIC VAGINITIS: ICD-10-CM

## 2025-01-03 DIAGNOSIS — K64.9 UNSPECIFIED HEMORRHOIDS: ICD-10-CM

## 2025-01-03 DIAGNOSIS — Z87.39 PERSONAL HISTORY OF OTHER DISEASES OF THE MUSCULOSKELETAL SYSTEM AND CONNECTIVE TISSUE: ICD-10-CM

## 2025-01-03 DIAGNOSIS — M85.80 OTHER SPECIFIED DISORDERS OF BONE DENSITY AND STRUCTURE, UNSPECIFIED SITE: ICD-10-CM

## 2025-01-03 PROCEDURE — G2211 COMPLEX E/M VISIT ADD ON: CPT

## 2025-01-03 PROCEDURE — 99214 OFFICE O/P EST MOD 30 MIN: CPT

## 2025-01-03 PROCEDURE — 82270 OCCULT BLOOD FECES: CPT

## 2025-01-04 PROBLEM — F52.31 ANORGASMIA OF FEMALE: Status: ACTIVE | Noted: 2025-01-04

## 2025-01-04 LAB
CARD LOT #: NORMAL
CARD LOT EXP DATE: NORMAL
DATE COLLECTED: NORMAL
DATE COLLECTED: NORMAL
DEVELOPER LOT #: NORMAL
DEVELOPER LOT EXP DATE: NORMAL
HEMOCCULT 2: NEGATIVE
HEMOCCULT SP1 STL QL: NEGATIVE
QUALITY CONTROL: YES
QUALITY CONTROL: YES

## 2025-01-04 RX ORDER — SILDENAFIL CITRATE 100 %
POWDER (GRAM) MISCELLANEOUS
Qty: 30 | Refills: 1 | Status: ACTIVE | COMMUNITY
Start: 2025-01-04 | End: 1900-01-01

## 2025-01-06 LAB — HPV HIGH+LOW RISK DNA PNL CVX: NOT DETECTED

## 2025-01-10 ENCOUNTER — RX RENEWAL (OUTPATIENT)
Age: 70
End: 2025-01-10

## 2025-01-10 LAB — CYTOLOGY CVX/VAG DOC THIN PREP: ABNORMAL

## 2025-01-17 NOTE — ASU PATIENT PROFILE, ADULT - PT NEEDS ASSIST
----- Message from DAVID Sanchez DO sent at 1/17/2025  4:34 PM CST -----  Regarding: Schedule follow up  Please schedule an in person or virtual follow up with this patient in 1-2 weeks.    Thank you.  
no

## 2025-01-31 ENCOUNTER — EMERGENCY (EMERGENCY)
Facility: HOSPITAL | Age: 70
LOS: 0 days | Discharge: HOME | End: 2025-01-31
Attending: EMERGENCY MEDICINE
Payer: MEDICARE

## 2025-01-31 VITALS
WEIGHT: 157.85 LBS | OXYGEN SATURATION: 100 % | DIASTOLIC BLOOD PRESSURE: 74 MMHG | HEART RATE: 61 BPM | SYSTOLIC BLOOD PRESSURE: 111 MMHG | TEMPERATURE: 97 F | RESPIRATION RATE: 18 BRPM

## 2025-01-31 VITALS
OXYGEN SATURATION: 98 % | SYSTOLIC BLOOD PRESSURE: 112 MMHG | HEART RATE: 67 BPM | TEMPERATURE: 98 F | RESPIRATION RATE: 16 BRPM | DIASTOLIC BLOOD PRESSURE: 65 MMHG

## 2025-01-31 DIAGNOSIS — R07.89 OTHER CHEST PAIN: ICD-10-CM

## 2025-01-31 DIAGNOSIS — K21.9 GASTRO-ESOPHAGEAL REFLUX DISEASE WITHOUT ESOPHAGITIS: ICD-10-CM

## 2025-01-31 DIAGNOSIS — G90.2 HORNER'S SYNDROME: ICD-10-CM

## 2025-01-31 DIAGNOSIS — F41.9 ANXIETY DISORDER, UNSPECIFIED: ICD-10-CM

## 2025-01-31 DIAGNOSIS — M19.90 UNSPECIFIED OSTEOARTHRITIS, UNSPECIFIED SITE: ICD-10-CM

## 2025-01-31 DIAGNOSIS — Z88.2 ALLERGY STATUS TO SULFONAMIDES: ICD-10-CM

## 2025-01-31 DIAGNOSIS — Z90.49 ACQUIRED ABSENCE OF OTHER SPECIFIED PARTS OF DIGESTIVE TRACT: Chronic | ICD-10-CM

## 2025-01-31 DIAGNOSIS — I25.10 ATHEROSCLEROTIC HEART DISEASE OF NATIVE CORONARY ARTERY WITHOUT ANGINA PECTORIS: ICD-10-CM

## 2025-01-31 DIAGNOSIS — Z88.8 ALLERGY STATUS TO OTHER DRUGS, MEDICAMENTS AND BIOLOGICAL SUBSTANCES: ICD-10-CM

## 2025-01-31 DIAGNOSIS — E03.9 HYPOTHYROIDISM, UNSPECIFIED: ICD-10-CM

## 2025-01-31 DIAGNOSIS — I10 ESSENTIAL (PRIMARY) HYPERTENSION: ICD-10-CM

## 2025-01-31 DIAGNOSIS — E78.5 HYPERLIPIDEMIA, UNSPECIFIED: ICD-10-CM

## 2025-01-31 LAB
ADD ON TEST-SPECIMEN IN LAB: SIGNIFICANT CHANGE UP
ALBUMIN SERPL ELPH-MCNC: 4.2 G/DL — SIGNIFICANT CHANGE UP (ref 3.3–5)
ALP SERPL-CCNC: 93 U/L — SIGNIFICANT CHANGE UP (ref 40–120)
ALT FLD-CCNC: 56 U/L — SIGNIFICANT CHANGE UP (ref 12–78)
ANION GAP SERPL CALC-SCNC: 3 MMOL/L — LOW (ref 5–17)
APTT BLD: 38.1 SEC — HIGH (ref 24.5–35.6)
AST SERPL-CCNC: 87 U/L — HIGH (ref 15–37)
BASOPHILS # BLD AUTO: 0.04 K/UL — SIGNIFICANT CHANGE UP (ref 0–0.2)
BASOPHILS NFR BLD AUTO: 0.6 % — SIGNIFICANT CHANGE UP (ref 0–2)
BILIRUB SERPL-MCNC: 0.5 MG/DL — SIGNIFICANT CHANGE UP (ref 0.2–1.2)
BUN SERPL-MCNC: 30 MG/DL — HIGH (ref 7–23)
CALCIUM SERPL-MCNC: 9.3 MG/DL — SIGNIFICANT CHANGE UP (ref 8.5–10.1)
CHLORIDE SERPL-SCNC: 105 MMOL/L — SIGNIFICANT CHANGE UP (ref 96–108)
CO2 SERPL-SCNC: 30 MMOL/L — SIGNIFICANT CHANGE UP (ref 22–31)
CREAT SERPL-MCNC: 1.17 MG/DL — SIGNIFICANT CHANGE UP (ref 0.5–1.3)
EGFR: 50 ML/MIN/1.73M2 — LOW
EOSINOPHIL # BLD AUTO: 0.22 K/UL — SIGNIFICANT CHANGE UP (ref 0–0.5)
EOSINOPHIL NFR BLD AUTO: 3.2 % — SIGNIFICANT CHANGE UP (ref 0–6)
GLUCOSE SERPL-MCNC: 104 MG/DL — HIGH (ref 70–99)
HCT VFR BLD CALC: 40.3 % — SIGNIFICANT CHANGE UP (ref 34.5–45)
HGB BLD-MCNC: 13.5 G/DL — SIGNIFICANT CHANGE UP (ref 11.5–15.5)
IMM GRANULOCYTES NFR BLD AUTO: 0.9 % — SIGNIFICANT CHANGE UP (ref 0–0.9)
INR BLD: 1.03 RATIO — SIGNIFICANT CHANGE UP (ref 0.85–1.16)
LIDOCAIN IGE QN: 62 U/L — SIGNIFICANT CHANGE UP (ref 13–75)
LYMPHOCYTES # BLD AUTO: 0.82 K/UL — LOW (ref 1–3.3)
LYMPHOCYTES # BLD AUTO: 11.9 % — LOW (ref 13–44)
MCHC RBC-ENTMCNC: 31.6 PG — SIGNIFICANT CHANGE UP (ref 27–34)
MCHC RBC-ENTMCNC: 33.5 G/DL — SIGNIFICANT CHANGE UP (ref 32–36)
MCV RBC AUTO: 94.4 FL — SIGNIFICANT CHANGE UP (ref 80–100)
MONOCYTES # BLD AUTO: 0.53 K/UL — SIGNIFICANT CHANGE UP (ref 0–0.9)
MONOCYTES NFR BLD AUTO: 7.7 % — SIGNIFICANT CHANGE UP (ref 2–14)
NEUTROPHILS # BLD AUTO: 5.21 K/UL — SIGNIFICANT CHANGE UP (ref 1.8–7.4)
NEUTROPHILS NFR BLD AUTO: 75.7 % — SIGNIFICANT CHANGE UP (ref 43–77)
PLATELET # BLD AUTO: 620 K/UL — HIGH (ref 150–400)
POTASSIUM SERPL-MCNC: 3.8 MMOL/L — SIGNIFICANT CHANGE UP (ref 3.5–5.3)
POTASSIUM SERPL-SCNC: 3.8 MMOL/L — SIGNIFICANT CHANGE UP (ref 3.5–5.3)
PROT SERPL-MCNC: 8 GM/DL — SIGNIFICANT CHANGE UP (ref 6–8.3)
PROTHROM AB SERPL-ACNC: 12.2 SEC — SIGNIFICANT CHANGE UP (ref 9.9–13.4)
RBC # BLD: 4.27 M/UL — SIGNIFICANT CHANGE UP (ref 3.8–5.2)
RBC # FLD: 14.9 % — HIGH (ref 10.3–14.5)
SODIUM SERPL-SCNC: 138 MMOL/L — SIGNIFICANT CHANGE UP (ref 135–145)
TROPONIN I, HIGH SENSITIVITY RESULT: <3 NG/L — SIGNIFICANT CHANGE UP
WBC # BLD: 6.88 K/UL — SIGNIFICANT CHANGE UP (ref 3.8–10.5)
WBC # FLD AUTO: 6.88 K/UL — SIGNIFICANT CHANGE UP (ref 3.8–10.5)

## 2025-01-31 PROCEDURE — 85610 PROTHROMBIN TIME: CPT

## 2025-01-31 PROCEDURE — 84484 ASSAY OF TROPONIN QUANT: CPT

## 2025-01-31 PROCEDURE — 93005 ELECTROCARDIOGRAM TRACING: CPT

## 2025-01-31 PROCEDURE — 83690 ASSAY OF LIPASE: CPT

## 2025-01-31 PROCEDURE — 85730 THROMBOPLASTIN TIME PARTIAL: CPT

## 2025-01-31 PROCEDURE — 36415 COLL VENOUS BLD VENIPUNCTURE: CPT

## 2025-01-31 PROCEDURE — 71045 X-RAY EXAM CHEST 1 VIEW: CPT

## 2025-01-31 PROCEDURE — 99285 EMERGENCY DEPT VISIT HI MDM: CPT | Mod: 25

## 2025-01-31 PROCEDURE — 80053 COMPREHEN METABOLIC PANEL: CPT

## 2025-01-31 PROCEDURE — 99284 EMERGENCY DEPT VISIT MOD MDM: CPT | Mod: FS

## 2025-01-31 PROCEDURE — 71045 X-RAY EXAM CHEST 1 VIEW: CPT | Mod: 26

## 2025-01-31 PROCEDURE — 93010 ELECTROCARDIOGRAM REPORT: CPT

## 2025-01-31 PROCEDURE — 85025 COMPLETE CBC W/AUTO DIFF WBC: CPT

## 2025-01-31 NOTE — ED STATDOCS - OBJECTIVE STATEMENT
69-year-old female with history of depression, OA, GERD, HTN, CAD, anxiety, chronic UTI, HLD, anemia, Hanny's syndrome,  mitral valve regurgitation, hypothyroidism,  s/p cholecystectomy s/p gastric bypass (20 years ago)  presents to ED c/o 1 episode of sudden lower chest pain lasting 30 seconds this morning. Then pt started to have pain between shoulder blades. Denies abd pain, back pain currently. Denies nausea, vomiting, weakness of UE.  Pt is on Metoprolol, Lisinopril, ozempic, Zoloft, gabbapentin.   Pt follows with Dr Emma Leone MD, cardio.  PCP Dr Daniele Garcia MD

## 2025-01-31 NOTE — ED STATDOCS - CLINICAL SUMMARY MEDICAL DECISION MAKING FREE TEXT BOX
68 y/o F with gastric bypass , cholecystectomy presents with sharp lower chest pain lasting 30 seconds . No associated SOB  nausea, vomiting. Symptoms have resolved. Benign abd exam. Abd Nontender. No associated GI symptoms . Will do cardiac workup, EKG, trop, monitor, CXR

## 2025-01-31 NOTE — ED STATDOCS - ATTENDING APP SHARED VISIT CONTRIBUTION OF CARE
I,Nik Rivers MD,  performed the initial face to face bedside interview with this patient regarding history of present illness, review of symptoms and relevant past medical, social and family history.  I completed an independent physical examination.  I was the initial provider who evaluated this patient. I have signed out the follow up of any pending tests (i.e. labs, radiological studies) to the ACP.  I have communicated the patient’s plan of care and disposition with the ACP.  The history, relevant review of systems, past medical and surgical history, medical decision making, and physical examination was documented by the scribe in my presence and I attest to the accuracy of the documentation.

## 2025-01-31 NOTE — ED ADULT NURSE NOTE - NSFALLUNIVINTERV_ED_ALL_ED
Bed/Stretcher in lowest position, wheels locked, appropriate side rails in place/Call bell, personal items and telephone in reach/Instruct patient to call for assistance before getting out of bed/chair/stretcher/Non-slip footwear applied when patient is off stretcher/Pike to call system/Physically safe environment - no spills, clutter or unnecessary equipment/Purposeful proactive rounding/Room/bathroom lighting operational, light cord in reach

## 2025-01-31 NOTE — ED ADULT NURSE NOTE - OBJECTIVE STATEMENT
Ambulatory to ER with c/o mid epigastric pain starting at 1130. A & o x 4, respirations even and unlabored on room air. Await MD blanca.

## 2025-02-19 ENCOUNTER — EMERGENCY (EMERGENCY)
Facility: HOSPITAL | Age: 70
LOS: 0 days | Discharge: HOME | End: 2025-02-19
Attending: FAMILY MEDICINE
Payer: MEDICARE

## 2025-02-19 VITALS
OXYGEN SATURATION: 97 % | RESPIRATION RATE: 16 BRPM | TEMPERATURE: 99 F | HEART RATE: 78 BPM | DIASTOLIC BLOOD PRESSURE: 63 MMHG | SYSTOLIC BLOOD PRESSURE: 103 MMHG

## 2025-02-19 VITALS — OXYGEN SATURATION: 99 % | RESPIRATION RATE: 18 BRPM | WEIGHT: 152.34 LBS | HEIGHT: 62 IN | HEART RATE: 79 BPM

## 2025-02-19 DIAGNOSIS — E78.5 HYPERLIPIDEMIA, UNSPECIFIED: ICD-10-CM

## 2025-02-19 DIAGNOSIS — J10.1 INFLUENZA DUE TO OTHER IDENTIFIED INFLUENZA VIRUS WITH OTHER RESPIRATORY MANIFESTATIONS: ICD-10-CM

## 2025-02-19 DIAGNOSIS — Q25.40 CONGENITAL MALFORMATION OF AORTA UNSPECIFIED: ICD-10-CM

## 2025-02-19 DIAGNOSIS — K21.9 GASTRO-ESOPHAGEAL REFLUX DISEASE WITHOUT ESOPHAGITIS: ICD-10-CM

## 2025-02-19 DIAGNOSIS — Z79.85 LONG-TERM (CURRENT) USE OF INJECTABLE NON-INSULIN ANTIDIABETIC DRUGS: ICD-10-CM

## 2025-02-19 DIAGNOSIS — Z88.2 ALLERGY STATUS TO SULFONAMIDES: ICD-10-CM

## 2025-02-19 DIAGNOSIS — I10 ESSENTIAL (PRIMARY) HYPERTENSION: ICD-10-CM

## 2025-02-19 DIAGNOSIS — Z88.8 ALLERGY STATUS TO OTHER DRUGS, MEDICAMENTS AND BIOLOGICAL SUBSTANCES: ICD-10-CM

## 2025-02-19 DIAGNOSIS — E03.9 HYPOTHYROIDISM, UNSPECIFIED: ICD-10-CM

## 2025-02-19 DIAGNOSIS — R06.02 SHORTNESS OF BREATH: ICD-10-CM

## 2025-02-19 DIAGNOSIS — J98.01 ACUTE BRONCHOSPASM: ICD-10-CM

## 2025-02-19 DIAGNOSIS — R05.9 COUGH, UNSPECIFIED: ICD-10-CM

## 2025-02-19 DIAGNOSIS — I25.10 ATHEROSCLEROTIC HEART DISEASE OF NATIVE CORONARY ARTERY WITHOUT ANGINA PECTORIS: ICD-10-CM

## 2025-02-19 DIAGNOSIS — F32.A DEPRESSION, UNSPECIFIED: ICD-10-CM

## 2025-02-19 DIAGNOSIS — Z90.49 ACQUIRED ABSENCE OF OTHER SPECIFIED PARTS OF DIGESTIVE TRACT: Chronic | ICD-10-CM

## 2025-02-19 DIAGNOSIS — Z86.79 PERSONAL HISTORY OF OTHER DISEASES OF THE CIRCULATORY SYSTEM: ICD-10-CM

## 2025-02-19 LAB
ALBUMIN SERPL ELPH-MCNC: 3.8 G/DL — SIGNIFICANT CHANGE UP (ref 3.3–5)
ALP SERPL-CCNC: 73 U/L — SIGNIFICANT CHANGE UP (ref 40–120)
ALT FLD-CCNC: 39 U/L — SIGNIFICANT CHANGE UP (ref 12–78)
ANION GAP SERPL CALC-SCNC: 5 MMOL/L — SIGNIFICANT CHANGE UP (ref 5–17)
APPEARANCE UR: CLEAR — SIGNIFICANT CHANGE UP
APTT BLD: 34.1 SEC — SIGNIFICANT CHANGE UP (ref 24.5–35.6)
AST SERPL-CCNC: 27 U/L — SIGNIFICANT CHANGE UP (ref 15–37)
BASOPHILS # BLD AUTO: 0.03 K/UL — SIGNIFICANT CHANGE UP (ref 0–0.2)
BASOPHILS NFR BLD AUTO: 0.6 % — SIGNIFICANT CHANGE UP (ref 0–2)
BILIRUB SERPL-MCNC: 0.4 MG/DL — SIGNIFICANT CHANGE UP (ref 0.2–1.2)
BILIRUB UR-MCNC: NEGATIVE — SIGNIFICANT CHANGE UP
BLD GP AB SCN SERPL QL: SIGNIFICANT CHANGE UP
BUN SERPL-MCNC: 27 MG/DL — HIGH (ref 7–23)
CALCIUM SERPL-MCNC: 8.8 MG/DL — SIGNIFICANT CHANGE UP (ref 8.5–10.1)
CHLORIDE SERPL-SCNC: 107 MMOL/L — SIGNIFICANT CHANGE UP (ref 96–108)
CO2 SERPL-SCNC: 25 MMOL/L — SIGNIFICANT CHANGE UP (ref 22–31)
COLOR SPEC: YELLOW — SIGNIFICANT CHANGE UP
CREAT SERPL-MCNC: 1.14 MG/DL — SIGNIFICANT CHANGE UP (ref 0.5–1.3)
D DIMER BLD IA.RAPID-MCNC: 253 NG/ML DDU — HIGH
DIFF PNL FLD: NEGATIVE — SIGNIFICANT CHANGE UP
EGFR: 52 ML/MIN/1.73M2 — LOW
EOSINOPHIL # BLD AUTO: 0.1 K/UL — SIGNIFICANT CHANGE UP (ref 0–0.5)
EOSINOPHIL NFR BLD AUTO: 1.9 % — SIGNIFICANT CHANGE UP (ref 0–6)
FLUAV AG NPH QL: DETECTED
FLUBV AG NPH QL: SIGNIFICANT CHANGE UP
GLUCOSE SERPL-MCNC: 95 MG/DL — SIGNIFICANT CHANGE UP (ref 70–99)
GLUCOSE UR QL: NEGATIVE MG/DL — SIGNIFICANT CHANGE UP
HCT VFR BLD CALC: 37.8 % — SIGNIFICANT CHANGE UP (ref 34.5–45)
HGB BLD-MCNC: 13.1 G/DL — SIGNIFICANT CHANGE UP (ref 11.5–15.5)
IMM GRANULOCYTES # BLD AUTO: 0.11 K/UL — HIGH (ref 0–0.07)
IMM GRANULOCYTES NFR BLD AUTO: 2.1 % — HIGH (ref 0–0.9)
INR BLD: 1.1 RATIO — SIGNIFICANT CHANGE UP (ref 0.85–1.16)
KETONES UR-MCNC: ABNORMAL MG/DL
LACTATE SERPL-SCNC: 1.2 MMOL/L — SIGNIFICANT CHANGE UP (ref 0.7–2)
LEUKOCYTE ESTERASE UR-ACNC: NEGATIVE — SIGNIFICANT CHANGE UP
LYMPHOCYTES # BLD AUTO: 1.04 K/UL — SIGNIFICANT CHANGE UP (ref 1–3.3)
LYMPHOCYTES NFR BLD AUTO: 20 % — SIGNIFICANT CHANGE UP (ref 13–44)
MAGNESIUM SERPL-MCNC: 2.1 MG/DL — SIGNIFICANT CHANGE UP (ref 1.6–2.6)
MCHC RBC-ENTMCNC: 31.6 PG — SIGNIFICANT CHANGE UP (ref 27–34)
MCHC RBC-ENTMCNC: 34.7 G/DL — SIGNIFICANT CHANGE UP (ref 32–36)
MCV RBC AUTO: 91.1 FL — SIGNIFICANT CHANGE UP (ref 80–100)
MONOCYTES # BLD AUTO: 0.34 K/UL — SIGNIFICANT CHANGE UP (ref 0–0.9)
MONOCYTES NFR BLD AUTO: 6.5 % — SIGNIFICANT CHANGE UP (ref 2–14)
NEUTROPHILS # BLD AUTO: 3.58 K/UL — SIGNIFICANT CHANGE UP (ref 1.8–7.4)
NEUTROPHILS NFR BLD AUTO: 68.9 % — SIGNIFICANT CHANGE UP (ref 43–77)
NITRITE UR-MCNC: NEGATIVE — SIGNIFICANT CHANGE UP
NRBC # BLD AUTO: 0 K/UL — SIGNIFICANT CHANGE UP (ref 0–0)
NRBC # FLD: 0 K/UL — SIGNIFICANT CHANGE UP (ref 0–0)
NRBC BLD AUTO-RTO: 0 /100 WBCS — SIGNIFICANT CHANGE UP (ref 0–0)
NT-PROBNP SERPL-SCNC: 71 PG/ML — SIGNIFICANT CHANGE UP (ref 0–125)
PH UR: 5.5 — SIGNIFICANT CHANGE UP (ref 5–8)
PLATELET # BLD AUTO: 416 K/UL — HIGH (ref 150–400)
PMV BLD: 11.1 FL — SIGNIFICANT CHANGE UP (ref 7–13)
POTASSIUM SERPL-MCNC: 4.1 MMOL/L — SIGNIFICANT CHANGE UP (ref 3.5–5.3)
POTASSIUM SERPL-SCNC: 4.1 MMOL/L — SIGNIFICANT CHANGE UP (ref 3.5–5.3)
PROT SERPL-MCNC: 7.4 GM/DL — SIGNIFICANT CHANGE UP (ref 6–8.3)
PROT UR-MCNC: NEGATIVE MG/DL — SIGNIFICANT CHANGE UP
PROTHROM AB SERPL-ACNC: 12.6 SEC — SIGNIFICANT CHANGE UP (ref 9.9–13.4)
RBC # BLD: 4.15 M/UL — SIGNIFICANT CHANGE UP (ref 3.8–5.2)
RBC # FLD: 14.6 % — HIGH (ref 10.3–14.5)
RSV RNA NPH QL NAA+NON-PROBE: SIGNIFICANT CHANGE UP
SARS-COV-2 RNA SPEC QL NAA+PROBE: SIGNIFICANT CHANGE UP
SODIUM SERPL-SCNC: 137 MMOL/L — SIGNIFICANT CHANGE UP (ref 135–145)
SP GR SPEC: 1.02 — SIGNIFICANT CHANGE UP (ref 1–1.03)
TROPONIN I, HIGH SENSITIVITY RESULT: <3 NG/L — SIGNIFICANT CHANGE UP
UROBILINOGEN FLD QL: 0.2 MG/DL — SIGNIFICANT CHANGE UP (ref 0.2–1)
WBC # BLD: 5.2 K/UL — SIGNIFICANT CHANGE UP (ref 3.8–10.5)
WBC # FLD AUTO: 5.2 K/UL — SIGNIFICANT CHANGE UP (ref 3.8–10.5)

## 2025-02-19 PROCEDURE — 83735 ASSAY OF MAGNESIUM: CPT

## 2025-02-19 PROCEDURE — 80053 COMPREHEN METABOLIC PANEL: CPT

## 2025-02-19 PROCEDURE — 96374 THER/PROPH/DIAG INJ IV PUSH: CPT

## 2025-02-19 PROCEDURE — 85730 THROMBOPLASTIN TIME PARTIAL: CPT

## 2025-02-19 PROCEDURE — 93005 ELECTROCARDIOGRAM TRACING: CPT

## 2025-02-19 PROCEDURE — 71045 X-RAY EXAM CHEST 1 VIEW: CPT | Mod: 26

## 2025-02-19 PROCEDURE — 85025 COMPLETE CBC W/AUTO DIFF WBC: CPT

## 2025-02-19 PROCEDURE — 71045 X-RAY EXAM CHEST 1 VIEW: CPT

## 2025-02-19 PROCEDURE — 83880 ASSAY OF NATRIURETIC PEPTIDE: CPT

## 2025-02-19 PROCEDURE — 99285 EMERGENCY DEPT VISIT HI MDM: CPT

## 2025-02-19 PROCEDURE — 83605 ASSAY OF LACTIC ACID: CPT

## 2025-02-19 PROCEDURE — 85379 FIBRIN DEGRADATION QUANT: CPT

## 2025-02-19 PROCEDURE — 84484 ASSAY OF TROPONIN QUANT: CPT

## 2025-02-19 PROCEDURE — 86850 RBC ANTIBODY SCREEN: CPT

## 2025-02-19 PROCEDURE — 94640 AIRWAY INHALATION TREATMENT: CPT

## 2025-02-19 PROCEDURE — 85610 PROTHROMBIN TIME: CPT

## 2025-02-19 PROCEDURE — 86901 BLOOD TYPING SEROLOGIC RH(D): CPT

## 2025-02-19 PROCEDURE — 86900 BLOOD TYPING SEROLOGIC ABO: CPT

## 2025-02-19 PROCEDURE — 36415 COLL VENOUS BLD VENIPUNCTURE: CPT

## 2025-02-19 PROCEDURE — 99285 EMERGENCY DEPT VISIT HI MDM: CPT | Mod: 25

## 2025-02-19 PROCEDURE — 81003 URINALYSIS AUTO W/O SCOPE: CPT

## 2025-02-19 PROCEDURE — 93010 ELECTROCARDIOGRAM REPORT: CPT

## 2025-02-19 PROCEDURE — 0241U: CPT

## 2025-02-19 RX ORDER — IPRATROPIUM BROMIDE AND ALBUTEROL SULFATE .5; 2.5 MG/3ML; MG/3ML
3 SOLUTION RESPIRATORY (INHALATION) ONCE
Refills: 0 | Status: COMPLETED | OUTPATIENT
Start: 2025-02-19 | End: 2025-02-19

## 2025-02-19 RX ORDER — BACTERIOSTATIC SODIUM CHLORIDE 0.9 %
3 VIAL (ML) INJECTION ONCE
Refills: 0 | Status: COMPLETED | OUTPATIENT
Start: 2025-02-19 | End: 2025-02-19

## 2025-02-19 RX ORDER — ALBUTEROL 90 MCG
2 AEROSOL REFILL (GRAM) INHALATION
Qty: 1 | Refills: 0
Start: 2025-02-19 | End: 2025-02-25

## 2025-02-19 RX ADMIN — IPRATROPIUM BROMIDE AND ALBUTEROL SULFATE 3 MILLILITER(S): .5; 2.5 SOLUTION RESPIRATORY (INHALATION) at 17:26

## 2025-02-19 RX ADMIN — Medication 3 MILLILITER(S): at 17:55

## 2025-02-19 NOTE — ED ADULT NURSE NOTE - OBJECTIVE STATEMENT
Pt presents to ED AOx3 from MD office c/o sob and cough x 2 weeks. Pt states she has been on steroids and abx for sinus infection w/ no relief and was following up w/ MD today and was told to come to ER. Pt denies chest pain, dizziness, fevers, chills or n/v.

## 2025-02-19 NOTE — ED PROVIDER NOTE - CLINICAL SUMMARY MEDICAL DECISION MAKING FREE TEXT BOX
Pt w/ cough for 2 weeks not relieved by steroids or antibiotics. Will do labs and swab. Will give duoneb and do CXR or CT, depending on results of lab work.

## 2025-02-19 NOTE — ED ADULT TRIAGE NOTE - CHIEF COMPLAINT QUOTE
pt presents to ED with complaints of 6 days cough and shortness of breath. endorses going to urgent care multiple times and receiving antibiotics and medrol dose pack. has been taking as prescribed. endorses no symptom improvement. went to MD Ferreira's office today and was sent to Ed for further work up.

## 2025-02-19 NOTE — ED PROVIDER NOTE - PROGRESS NOTE DETAILS
Pt feels better after duoneb. Less cough. Rasheeda MARTIN Pt called on phone after d/c asking if she should dd/c the antibiotics. Advised to stop amoxil. Rasheeda MARTIN

## 2025-02-19 NOTE — ED PROVIDER NOTE - NSFOLLOWUPINSTRUCTIONS_ED_ALL_ED_FT
Drink lots of fluids. Use albuterol two puffs three times daily. Follow up with your doctor. Return to Er if worse.

## 2025-02-19 NOTE — ED ADULT NURSE REASSESSMENT NOTE - NS ED NURSE REASSESS COMMENT FT1
Pt resting in stretcher AOx3 w/ no complaints aware of POC to await full test results. Pt verbalizing improvement in cough/sob. VSS. Pt within sight of RN station. Safety and comfort maintained.

## 2025-02-19 NOTE — ED PROVIDER NOTE - OBJECTIVE STATEMENT
68 y/o female with a PMHx of CAD, aortic aneurysm rupture CT scan stable for the past few years, congenital anomaly of aorta, mitral valve regurgitation, diverticulosis, HLD, HTN on Lisinopril and Metoprolol, hypothyroidism,  cholelithiasis, GERD, anemia, anxiety and depression presents to the ED c/o SOB and cough onset 2 weeks PTA. Pt was seen earlier today at MD Garcia's office and was recommended to come into the ED. Pt went to a health center and was told she has fluid behind her ears and sinus infection and was prescribed doxycycline and steroids/Medrol dose pack causing severe diarrhea. Pt was then switched to amoxicillin. Pt was -covid, -flu and not given inhaler at health center. Pt  denies fever, chest pain, headache, body aches, extremity pain/numbness/tingling or recent travel. Pt states she smoked 40 yrs ago for 7 years chronically. Pt also endorses being on Ozempic for 2 years now.

## 2025-02-19 NOTE — ED PROVIDER NOTE - PATIENT PORTAL LINK FT
You can access the FollowMyHealth Patient Portal offered by  by registering at the following website: [email or web address]. By joining [facility]’s FollowMyHealth portal, you will also be able to view your health information using other applications (apps) compatible with our system.

## 2025-02-19 NOTE — ED ADULT NURSE REASSESSMENT NOTE - NS ED NURSE REASSESS COMMENT FT1
Obtained bedside report from MARCIA Sharpe at 19:00. Pt resting comfortably in bed, breathing even and unlabored on room air. Pt has no complaints or concerns at this time. VS as charted. Safety and comfort measures maintained.

## 2025-02-27 ENCOUNTER — APPOINTMENT (OUTPATIENT)
Dept: PULMONOLOGY | Facility: CLINIC | Age: 70
End: 2025-02-27
Payer: MEDICARE

## 2025-02-27 VITALS
BODY MASS INDEX: 27.97 KG/M2 | HEART RATE: 77 BPM | DIASTOLIC BLOOD PRESSURE: 60 MMHG | SYSTOLIC BLOOD PRESSURE: 80 MMHG | HEIGHT: 62 IN | RESPIRATION RATE: 16 BRPM | OXYGEN SATURATION: 94 % | TEMPERATURE: 98 F | WEIGHT: 152 LBS

## 2025-02-27 VITALS — DIASTOLIC BLOOD PRESSURE: 60 MMHG | SYSTOLIC BLOOD PRESSURE: 90 MMHG

## 2025-02-27 DIAGNOSIS — R05.9 COUGH, UNSPECIFIED: ICD-10-CM

## 2025-02-27 PROCEDURE — 99204 OFFICE O/P NEW MOD 45 MIN: CPT

## 2025-03-01 PROBLEM — R05.9 COUGH: Status: ACTIVE | Noted: 2025-02-27

## 2025-03-13 ENCOUNTER — APPOINTMENT (OUTPATIENT)
Dept: INTERNAL MEDICINE | Facility: CLINIC | Age: 70
End: 2025-03-13

## 2025-04-02 ENCOUNTER — EMERGENCY (EMERGENCY)
Facility: HOSPITAL | Age: 70
LOS: 0 days | Discharge: HOME | End: 2025-04-02
Attending: STUDENT IN AN ORGANIZED HEALTH CARE EDUCATION/TRAINING PROGRAM
Payer: MEDICARE

## 2025-04-02 VITALS — HEIGHT: 62 IN | WEIGHT: 157.19 LBS

## 2025-04-02 VITALS
SYSTOLIC BLOOD PRESSURE: 110 MMHG | WEIGHT: 157.19 LBS | DIASTOLIC BLOOD PRESSURE: 62 MMHG | TEMPERATURE: 98 F | RESPIRATION RATE: 20 BRPM | HEART RATE: 73 BPM | OXYGEN SATURATION: 99 %

## 2025-04-02 DIAGNOSIS — R51.9 HEADACHE, UNSPECIFIED: ICD-10-CM

## 2025-04-02 DIAGNOSIS — Z90.49 ACQUIRED ABSENCE OF OTHER SPECIFIED PARTS OF DIGESTIVE TRACT: Chronic | ICD-10-CM

## 2025-04-02 PROCEDURE — 99283 EMERGENCY DEPT VISIT LOW MDM: CPT | Mod: 25

## 2025-04-02 PROCEDURE — 99284 EMERGENCY DEPT VISIT MOD MDM: CPT | Mod: FS

## 2025-04-02 RX ORDER — PREDNISONE 20 MG/1
2 TABLET ORAL
Qty: 10 | Refills: 0
Start: 2025-04-02 | End: 2025-04-06

## 2025-04-02 NOTE — ED STATDOCS - OBJECTIVE STATEMENT
68 y/o F with PMHx of thrombocytosis, osteopenia, chronic UTI, HLD, anemia, splenomegaly, congenital anomaly of aorta, aortic aneurysm, Hanny's syndrome, hypothyroidism, CAD, anxiety, depression, OA, GERD, HTN, gastric bypass, abdominoplasty, cholecystectomy presents to the ED c/o itchy palms and swelling and numbness/tingling on the inner corners of her lips s/p being outside with dogs at 7pm. Reports the palm itching lasted 10 minutes but her lips are still feeling "weird." Reports she can swallow, but she has a sore throat and some tenderness when she pushes on her throat. Reports she sees these dogs all the time and has never at a reaction before. States she went to  and was given Prednisone and Pepcid and told to come to the ED. Notes she started Adderall yesterday but has not had any issues. Allergic to Benadryl and Sulfa.

## 2025-04-02 NOTE — ED STATDOCS - CLINICAL SUMMARY MEDICAL DECISION MAKING FREE TEXT BOX
69-year-old female presenting with possible allergic reaction. Patient received steroids and antiH2 prior to arrival. No airway compromise, no secondary signs of allergic reaction. Likely isolated reaction, will treat supportively with steroid rx, antiH2, d/c with PMD follow up.

## 2025-04-02 NOTE — ED STATDOCS - PROGRESS NOTE DETAILS
Pt. feeling better while observing in ED.  Neg. lip/tongue swelling, rash reported.  Will DC with prednisone x 4 days and Epipen as needed.  Return for any concerns.  Nori Morris PA-C

## 2025-04-02 NOTE — ED STATDOCS - PATIENT PORTAL LINK FT
You can access the FollowMyHealth Patient Portal offered by [facility] by registering at the following website: [email or web address]. By joining [facility]’s FollowMyHealth portal, you will also be able to view your health information using other applications (apps) compatible with our system.

## 2025-04-02 NOTE — ED STATDOCS - NSFOLLOWUPINSTRUCTIONS_ED_ALL_ED_FT
Allergies    WHAT YOU NEED TO KNOW:    What are allergies? Allergies are an immune system reaction to a substance called an allergen. Your immune system sees the allergen as harmful and attacks it.    What causes allergies? You may have allergies at certain times of the year or all year. The following are common allergies:    Seasonal airborne allergies happen during certain times of the year. This is also called hay fever. Tree, weed, or grass pollen are examples of allergens that you breathe in.    Environmental airborne allergy triggers you may breathe in year-round include dust, mold, and pet hair.    Contact allergies include latex, found in items such as condoms and medical gloves. Latex allergies can be very serious.    Insect sting allergies may be caused by bees, hornets, fire ants, or other insects that sting or bite you. Insect allergies can be very serious.    Food allergies commonly include shellfish, wheat, and eggs. Some foods must be eaten to produce an allergic reaction. Other foods can trigger a reaction if they touch your skin or are breathed in.  What increases my risk for allergies? Allergic reactions can happen at any time, even if you have not had allergies before. You may develop an allergy after you have been exposed to an allergen more than once. Allergies are most common in children and elderly people, but anyone can have an allergic reaction. Your risk is also increased if you have a family history of allergies or a medical condition such as asthma.    What are the signs and symptoms of allergies?    Mild symptoms include sneezing and a runny, itchy, or stuffy nose. You may also have swollen, watery, or itchy eyes, or skin itching. You may have swelling or pain where an insect bit or stung you.    Anaphylaxis symptoms include trouble breathing or swallowing, a rash or hives, or severe swelling. You may also have a cough, wheezing, or feel lightheaded or dizzy. Anaphylaxis is a sudden, life-threatening reaction that needs immediate treatment.  How are allergies diagnosed? Your healthcare provider will ask about your signs and symptoms. The provider will ask which allergens you have been exposed to and if you have ever had other allergic reactions. The provider may look in your nose, ears, or throat. You may need additional testing if you developed anaphylaxis after you were exposed to a trigger and then exercised. This is called exercise-induced anaphylaxis. You may also need the following tests:    Blood tests are used to check for signs of a reaction to allergens.    Nasal tests are used to see how your nasal passages react to allergens. A sample of your nasal fluid may also be tested.    Skin tests can help your healthcare provider find what you are allergic to. A small amount of allergen will be placed on your arm or back. Your skin will then be pricked with a needle. Your provider will watch how your skin reacts to the allergen.  How are allergies treated?    Antihistamines help decrease itching, sneezing, and swelling. You may take them as a pill or use drops in your nose or eyes.    Decongestants help your nose feel less stuffy.    Steroids decrease swelling and redness.    Topical treatments help decrease itching or swelling. You also may be given nasal sprays or eyedrops.    Epinephrine is medicine used to treat severe allergic reactions such as anaphylaxis.    Desensitization gets your body used to allergens you cannot avoid. Your healthcare provider will give you a shot that contains a small amount of an allergen. Any allergic reaction you have will be treated. Your provider will give you more of the allergen a little at a time until your body gets used to it. Your reaction to the allergen may be less serious after this treatment. Your provider will tell you how long to get the shots.  What steps do I need to take for signs or symptoms of anaphylaxis?    Immediately give 1 shot of epinephrine only into the outer thigh muscle.    Leave the shot in place as directed. Your healthcare provider may recommend you leave it in place for up to 10 seconds before you remove it. This helps make sure all of the epinephrine is delivered.    Call 911 and go to the emergency department, even if the shot improved symptoms. Do not drive yourself. Bring the used epinephrine shot with you.  What safety precautions do I need to take if I am at risk for anaphylaxis?    Keep 2 shots of epinephrine with you at all times. You may need a second shot, because epinephrine only works for about 20 minutes and symptoms may return. Your healthcare provider can show you and family members how to give the shot. Check the expiration date every month and replace it before it expires.    Create an action plan. Your healthcare provider can help you create a written plan that explains the allergy and an emergency plan to treat a reaction. The plan explains when to give a second epinephrine shot if symptoms return or do not improve after the first. Give copies of the action plan and emergency instructions to family members and work staff. Show them how to give a shot of epinephrine.    Be careful when you exercise. If you have had exercise-induced anaphylaxis, do not exercise right after you eat. Stop exercising right away if you start to develop any signs or symptoms of anaphylaxis. You may first feel tired, warm, or have itchy skin. Hives, swelling, and severe breathing problems may develop if you continue to exercise.    Carry medical alert identification. Wear medical alert jewelry or carry a card that explains the allergy. Ask your healthcare provider where to get these items.  Medical Alert Jewelry      Inform all healthcare providers of the allergy. This includes dentists, nurses, doctors, and surgeons.  How can I manage allergies?    Use nasal rinses as directed. Rinse with a saline solution daily. This will help clear allergens out of your nose. Use distilled water if possible. You can also boil tap water and let it cool before you use it. Do not use tap water that has not been boiled.    Do not smoke. Allergy symptoms may decrease if you are not around smoke. Nicotine and other chemicals in cigarettes and cigars can cause lung damage. Ask your healthcare provider for information if you currently smoke and need help to quit. E-cigarettes or smokeless tobacco still contain nicotine. Talk to your healthcare provider before you use these products.  How can I prevent an allergic reaction?    Do not go outside when pollen counts are high if you have seasonal allergies. Your symptoms may be better if you go outside only in the morning or evening. Use your air conditioner, and change air filters often.    Avoid dust, fur, and mold. Dust and vacuum your home often. You may want to wear a mask when you vacuum. Keep pets in certain rooms, and bathe them often. Use a dehumidifier (machine that decreases moisture) to help prevent mold.    Do not use products that contain latex if you have a latex allergy. Use nonlatex gloves if you work in healthcare or in food preparation. Always tell healthcare providers about a latex allergy.    Avoid areas that attract insects if you have an insect bite or sting allergy. Areas include trash cans, gardens, and picnics. Do not wear bright clothing or strong scents when you will be outside.    Prevent an allergic reaction caused by food. You may have a reaction if your food is not prepared safely. For example, you could be served food that touched your trigger food during preparation. This is called cross-contamination. Kitchen tools can also cause cross-contamination. You may also eat baked foods that contain a trigger food you do not know about. Ask if the food contains your trigger food before you handle or eat it.  Call 911 for signs or symptoms of anaphylaxis, such as trouble breathing, swelling in your mouth or throat, or wheezing. You may also have itching, a rash, hives, or feel like you are going to faint.    When should I seek immediate care?    You have tingling in your hands or feet.    Your skin is red or flushed.  When should I contact my healthcare provider?    You have questions or concerns about your condition or care.    CARE AGREEMENT:    You have the right to help plan your care. Learn about your health condition and how it may be treated. Discuss treatment options with your healthcare providers to decide what care you want to receive. You always have the right to refuse treatment.

## 2025-04-02 NOTE — ED ADULT TRIAGE NOTE - CHIEF COMPLAINT QUOTE
Pt ambulatory to the ED c/o itchiness to palms of hand and inner lip swelling after being outside with dogs. [facility] and was told to come to the ED for further evaluation. Pt last took prednisone and Pepcid 30 min PTA. Pt denies SOB, reports airway patent at this time. O2 sat 100% on room air, speaking in complete sentences. Denies itchiness to hands at this time, no tongue swelling or throat closing as per pt at this time.

## 2025-04-02 NOTE — ED STATDOCS - PHYSICAL EXAMINATION
Patient just poked her skin with a hodan staple and she wants to make sure she is up to date on her Tdap   GENERAL: A&Ox4, non-toxic appearing, no acute distress  HEENT: NCAT, EOMI, oral mucosa moist, normal conjunctiva, mild pharyngeal erythema, no cervical lymphadenopthy  RESP: no respiratory distress, CTAB, no wheezes/rhonchi/rales, speaking in full sentences  CV: RRR, no murmurs/rubs/gallops  ABDOMEN: soft, non-tender, non-distended, no guarding, no rebound tenderness  MSK: no visible deformities  NEURO: no focal sensory or motor deficits, CN 2-12 grossly intact  SKIN: warm, normal color, well perfused, no rash  PSYCH: normal affect

## 2025-04-02 NOTE — ED STATDOCS - CARE PROVIDER_API CALL
Michelle Dolan  Allergy and Immunology  [address] [place] [id number]  Phone: [phone]  Fax: [phone]  Follow Up Time:

## 2025-05-09 RX ORDER — HYDROCORTISONE 25 MG/G
2.5 CREAM TOPICAL
Qty: 30 | Refills: 6 | Status: ACTIVE | COMMUNITY
Start: 2025-05-09 | End: 1900-01-01

## 2025-05-09 RX ORDER — HYDROCORTISONE ACETATE 25 MG/1
25 SUPPOSITORY RECTAL
Qty: 10 | Refills: 6 | Status: ACTIVE | COMMUNITY
Start: 2025-05-09 | End: 1900-01-01

## 2025-05-12 ENCOUNTER — NON-APPOINTMENT (OUTPATIENT)
Age: 70
End: 2025-05-12

## 2025-05-12 ENCOUNTER — APPOINTMENT (OUTPATIENT)
Dept: COLORECTAL SURGERY | Facility: CLINIC | Age: 70
End: 2025-05-12
Payer: MEDICARE

## 2025-05-12 VITALS
OXYGEN SATURATION: 95 % | RESPIRATION RATE: 14 BRPM | DIASTOLIC BLOOD PRESSURE: 75 MMHG | TEMPERATURE: 98.2 F | BODY MASS INDEX: 26.87 KG/M2 | HEART RATE: 70 BPM | SYSTOLIC BLOOD PRESSURE: 110 MMHG | WEIGHT: 146 LBS | HEIGHT: 62 IN

## 2025-05-12 DIAGNOSIS — K64.8 OTHER HEMORRHOIDS: ICD-10-CM

## 2025-05-12 PROCEDURE — 46221 LIGATION OF HEMORRHOID(S): CPT

## 2025-05-12 PROCEDURE — 99214 OFFICE O/P EST MOD 30 MIN: CPT | Mod: 25

## 2025-05-21 ENCOUNTER — APPOINTMENT (OUTPATIENT)
Dept: DERMATOLOGY | Facility: CLINIC | Age: 70
End: 2025-05-21
Payer: MEDICARE

## 2025-05-21 DIAGNOSIS — I78.1 NEVUS, NON-NEOPLASTIC: ICD-10-CM

## 2025-05-21 DIAGNOSIS — Z09 ENCOUNTER FOR FOLLOW-UP EXAMINATION AFTER COMPLETED TREATMENT FOR CONDITIONS OTHER THAN MALIGNANT NEOPLASM: ICD-10-CM

## 2025-05-21 DIAGNOSIS — L82.1 OTHER SEBORRHEIC KERATOSIS: ICD-10-CM

## 2025-05-21 DIAGNOSIS — R10.12 LEFT UPPER QUADRANT PAIN: ICD-10-CM

## 2025-05-21 DIAGNOSIS — Z41.1 ENCOUNTER FOR COSMETIC SURGERY: ICD-10-CM

## 2025-05-21 DIAGNOSIS — L81.4 OTHER MELANIN HYPERPIGMENTATION: ICD-10-CM

## 2025-05-21 DIAGNOSIS — D18.01 HEMANGIOMA OF SKIN AND SUBCUTANEOUS TISSUE: ICD-10-CM

## 2025-05-21 PROCEDURE — 99213 OFFICE O/P EST LOW 20 MIN: CPT

## 2025-05-21 PROCEDURE — D0123: CPT

## 2025-05-21 RX ORDER — GABAPENTIN 600 MG/1
600 TABLET, COATED ORAL
Refills: 0 | Status: ACTIVE | COMMUNITY

## 2025-05-21 RX ORDER — SEMAGLUTIDE 2.68 MG/ML
8 INJECTION, SOLUTION SUBCUTANEOUS
Refills: 0 | Status: ACTIVE | COMMUNITY

## 2025-05-21 RX ORDER — CERTOLIZUMAB PEGOL 200 MG/ML
200 INJECTION, SOLUTION SUBCUTANEOUS
Refills: 0 | Status: ACTIVE | COMMUNITY

## 2025-05-21 RX ORDER — DEXTROAMPHETAMINE SACCHARATE, AMPHETAMINE ASPARTATE, DEXTROAMPHETAMINE SULFATE, AND AMPHETAMINE SULFATE 5; 5; 5; 5 MG/1; MG/1; MG/1; MG/1
20 TABLET ORAL
Refills: 0 | Status: ACTIVE | COMMUNITY

## 2025-05-28 ENCOUNTER — APPOINTMENT (OUTPATIENT)
Dept: INTERNAL MEDICINE | Facility: CLINIC | Age: 70
End: 2025-05-28
Payer: MEDICARE

## 2025-05-28 VITALS
TEMPERATURE: 98.2 F | HEART RATE: 77 BPM | RESPIRATION RATE: 16 BRPM | DIASTOLIC BLOOD PRESSURE: 80 MMHG | HEIGHT: 62 IN | OXYGEN SATURATION: 96 % | SYSTOLIC BLOOD PRESSURE: 110 MMHG | WEIGHT: 146 LBS | BODY MASS INDEX: 26.87 KG/M2

## 2025-05-28 DIAGNOSIS — R06.09 OTHER FORMS OF DYSPNEA: ICD-10-CM

## 2025-05-28 DIAGNOSIS — Z87.891 PERSONAL HISTORY OF NICOTINE DEPENDENCE: ICD-10-CM

## 2025-05-28 DIAGNOSIS — R05.9 COUGH, UNSPECIFIED: ICD-10-CM

## 2025-05-28 PROCEDURE — ZZZZZ: CPT

## 2025-05-28 PROCEDURE — 94727 GAS DIL/WSHOT DETER LNG VOL: CPT

## 2025-05-28 PROCEDURE — 94060 EVALUATION OF WHEEZING: CPT

## 2025-05-28 PROCEDURE — 99213 OFFICE O/P EST LOW 20 MIN: CPT | Mod: 25

## 2025-05-28 PROCEDURE — 94729 DIFFUSING CAPACITY: CPT

## 2025-05-28 RX ORDER — ASPIRIN 81 MG
TABLET,CHEWABLE ORAL
Refills: 0 | Status: ACTIVE | COMMUNITY

## 2025-06-02 ENCOUNTER — APPOINTMENT (OUTPATIENT)
Dept: COLORECTAL SURGERY | Facility: CLINIC | Age: 70
End: 2025-06-02
Payer: MEDICARE

## 2025-06-02 VITALS
TEMPERATURE: 97.8 F | RESPIRATION RATE: 14 BRPM | SYSTOLIC BLOOD PRESSURE: 130 MMHG | WEIGHT: 145 LBS | BODY MASS INDEX: 26.68 KG/M2 | HEART RATE: 75 BPM | OXYGEN SATURATION: 96 % | HEIGHT: 62 IN | DIASTOLIC BLOOD PRESSURE: 84 MMHG

## 2025-06-02 DIAGNOSIS — K62.89 OTHER SPECIFIED DISEASES OF ANUS AND RECTUM: ICD-10-CM

## 2025-06-02 DIAGNOSIS — K64.8 OTHER HEMORRHOIDS: ICD-10-CM

## 2025-06-02 PROCEDURE — 99214 OFFICE O/P EST MOD 30 MIN: CPT | Mod: 25

## 2025-06-02 PROCEDURE — 46221 LIGATION OF HEMORRHOID(S): CPT

## 2025-06-02 RX ORDER — LISINOPRIL AND HYDROCHLOROTHIAZIDE TABLETS 20; 12.5 MG/1; MG/1
20-12.5 TABLET ORAL
Refills: 0 | Status: DISCONTINUED | COMMUNITY
End: 2025-06-02

## 2025-06-03 ENCOUNTER — APPOINTMENT (OUTPATIENT)
Dept: UROLOGY | Facility: CLINIC | Age: 70
End: 2025-06-03
Payer: MEDICARE

## 2025-06-03 VITALS
OXYGEN SATURATION: 97 % | BODY MASS INDEX: 26.68 KG/M2 | HEART RATE: 73 BPM | HEIGHT: 62 IN | DIASTOLIC BLOOD PRESSURE: 78 MMHG | RESPIRATION RATE: 16 BRPM | SYSTOLIC BLOOD PRESSURE: 134 MMHG | WEIGHT: 145 LBS

## 2025-06-03 DIAGNOSIS — R82.71 BACTERIURIA: ICD-10-CM

## 2025-06-03 PROCEDURE — 99213 OFFICE O/P EST LOW 20 MIN: CPT

## 2025-06-03 RX ORDER — LIDOCAINE 50 MG/G
5 CREAM TOPICAL
Qty: 1 | Refills: 0 | Status: ACTIVE | COMMUNITY
Start: 2025-06-03 | End: 1900-01-01

## 2025-06-11 ENCOUNTER — APPOINTMENT (OUTPATIENT)
Dept: GASTROENTEROLOGY | Facility: CLINIC | Age: 70
End: 2025-06-11
Payer: MEDICARE

## 2025-06-11 VITALS
WEIGHT: 147 LBS | DIASTOLIC BLOOD PRESSURE: 70 MMHG | HEIGHT: 62 IN | BODY MASS INDEX: 27.05 KG/M2 | SYSTOLIC BLOOD PRESSURE: 114 MMHG

## 2025-06-11 PROBLEM — R10.9 ABDOMINAL PAIN: Status: ACTIVE | Noted: 2025-06-11

## 2025-06-11 PROBLEM — R10.11 RUQ DISCOMFORT: Status: ACTIVE | Noted: 2025-06-11

## 2025-06-11 PROCEDURE — 99213 OFFICE O/P EST LOW 20 MIN: CPT

## 2025-06-11 RX ORDER — HYOSCYAMINE SULFATE 0.12 MG/1
0.12 TABLET, ORALLY DISINTEGRATING ORAL
Qty: 45 | Refills: 2 | Status: ACTIVE | COMMUNITY
Start: 2025-06-11 | End: 1900-01-01

## 2025-06-25 ENCOUNTER — APPOINTMENT (OUTPATIENT)
Dept: COLORECTAL SURGERY | Facility: CLINIC | Age: 70
End: 2025-06-25
Payer: MEDICARE

## 2025-06-25 VITALS
TEMPERATURE: 98 F | DIASTOLIC BLOOD PRESSURE: 100 MMHG | OXYGEN SATURATION: 93 % | WEIGHT: 147 LBS | SYSTOLIC BLOOD PRESSURE: 147 MMHG | HEIGHT: 62 IN | RESPIRATION RATE: 14 BRPM | HEART RATE: 79 BPM | BODY MASS INDEX: 27.05 KG/M2

## 2025-06-25 PROCEDURE — 46221 LIGATION OF HEMORRHOID(S): CPT

## 2025-06-25 PROCEDURE — 99214 OFFICE O/P EST MOD 30 MIN: CPT | Mod: 25

## 2025-07-22 DIAGNOSIS — R10.9 UNSPECIFIED ABDOMINAL PAIN: ICD-10-CM

## 2025-07-25 DIAGNOSIS — K83.8 OTHER SPECIFIED DISEASES OF BILIARY TRACT: ICD-10-CM

## 2025-08-03 ENCOUNTER — OUTPATIENT (OUTPATIENT)
Dept: OUTPATIENT SERVICES | Facility: HOSPITAL | Age: 70
LOS: 1 days | End: 2025-08-03
Payer: MEDICARE

## 2025-08-03 DIAGNOSIS — R10.9 UNSPECIFIED ABDOMINAL PAIN: ICD-10-CM

## 2025-08-03 DIAGNOSIS — Z90.49 ACQUIRED ABSENCE OF OTHER SPECIFIED PARTS OF DIGESTIVE TRACT: Chronic | ICD-10-CM

## 2025-08-03 DIAGNOSIS — K83.8 OTHER SPECIFIED DISEASES OF BILIARY TRACT: ICD-10-CM

## 2025-08-03 PROCEDURE — A9585: CPT

## 2025-08-03 PROCEDURE — 74183 MRI ABD W/O CNTR FLWD CNTR: CPT

## 2025-08-04 ENCOUNTER — NON-APPOINTMENT (OUTPATIENT)
Age: 70
End: 2025-08-04

## 2025-08-04 LAB
ALBUMIN SERPL ELPH-MCNC: 4.4 G/DL
ALP BLD-CCNC: 103 U/L
ALT SERPL-CCNC: 57 U/L
ANION GAP SERPL CALC-SCNC: 16 MMOL/L
AST SERPL-CCNC: 47 U/L
BILIRUB SERPL-MCNC: 0.3 MG/DL
BUN SERPL-MCNC: 26 MG/DL
CALCIUM SERPL-MCNC: 9.1 MG/DL
CHLORIDE SERPL-SCNC: 98 MMOL/L
CO2 SERPL-SCNC: 27 MMOL/L
CREAT SERPL-MCNC: 0.98 MG/DL
EGFRCR SERPLBLD CKD-EPI 2021: 62 ML/MIN/1.73M2
GLUCOSE SERPL-MCNC: 94 MG/DL
POTASSIUM SERPL-SCNC: 4.4 MMOL/L
PROT SERPL-MCNC: 7.5 G/DL
SODIUM SERPL-SCNC: 141 MMOL/L

## 2025-08-12 ENCOUNTER — NON-APPOINTMENT (OUTPATIENT)
Age: 70
End: 2025-08-12

## 2025-08-18 DIAGNOSIS — R79.89 OTHER SPECIFIED ABNORMAL FINDINGS OF BLOOD CHEMISTRY: ICD-10-CM

## 2025-08-21 LAB
A1AT SERPL-MCNC: 150 MG/DL
AFP-TM SERPL-MCNC: 3.9 NG/ML
ALBUMIN SERPL ELPH-MCNC: 4.4 G/DL
ALP BLD-CCNC: 88 U/L
ALT SERPL-CCNC: 36 U/L
ANA PAT SER-IMP: ABNORMAL
ANA PAT SER-IMP: ABNORMAL
ANA TITR SER: ABNORMAL
ANA TITR SER: ABNORMAL
AST SERPL-CCNC: 34 U/L
BILIRUB DIRECT SERPL-MCNC: 0.14 MG/DL
BILIRUB INDIRECT SERPL-MCNC: 0.3 MG/DL
BILIRUB SERPL-MCNC: 0.4 MG/DL
CERULOPLASMIN SERPL-MCNC: 21 MG/DL
FERRITIN SERPL-MCNC: 50 NG/ML
GGT SERPL-CCNC: 15 U/L
HBV CORE IGG+IGM SER QL: NONREACTIVE
HBV SURFACE AB SER QL: NONREACTIVE
HBV SURFACE AG SER QL: NONREACTIVE
HCV AB SER QL: NONREACTIVE
HCV S/CO RATIO: 0.15 S/CO
INR PPP: 1.06 RATIO
IRON SATN MFR SERPL: 27 %
IRON SERPL-MCNC: 100 UG/DL
MITOCHONDRIA AB SER IF-ACNC: NORMAL
PROT SERPL-MCNC: 7.3 G/DL
PT BLD: 12.3 SEC
SMOOTH MUSCLE AB SER QL IF: NORMAL
TIBC SERPL-MCNC: 366 UG/DL
UIBC SERPL-MCNC: 267 UG/DL

## 2025-08-22 LAB
ALP BLD-CCNC: 84 IU/L
ALP BONE CFR SERPL: 26 %
ALP INTEST CFR SERPL: 16 %
ALP LIVER CFR SERPL: 58 %

## 2025-08-26 ENCOUNTER — APPOINTMENT (OUTPATIENT)
Dept: ELECTROPHYSIOLOGY | Facility: CLINIC | Age: 70
End: 2025-08-26